# Patient Record
Sex: FEMALE | Race: WHITE | Employment: OTHER | URBAN - METROPOLITAN AREA
[De-identification: names, ages, dates, MRNs, and addresses within clinical notes are randomized per-mention and may not be internally consistent; named-entity substitution may affect disease eponyms.]

---

## 2017-02-27 RX ORDER — AMLODIPINE BESYLATE 10 MG/1
TABLET ORAL
Qty: 90 TABLET | Refills: 3 | Status: SHIPPED | OUTPATIENT
Start: 2017-02-27 | End: 2018-02-21 | Stop reason: SDUPTHER

## 2017-09-26 ENCOUNTER — OFFICE VISIT (OUTPATIENT)
Dept: CARDIOLOGY CLINIC | Age: 67
End: 2017-09-26

## 2017-09-26 VITALS
HEART RATE: 64 BPM | WEIGHT: 137 LBS | SYSTOLIC BLOOD PRESSURE: 132 MMHG | HEIGHT: 60 IN | OXYGEN SATURATION: 98 % | BODY MASS INDEX: 26.9 KG/M2 | DIASTOLIC BLOOD PRESSURE: 62 MMHG

## 2017-09-26 DIAGNOSIS — I10 ESSENTIAL HYPERTENSION: ICD-10-CM

## 2017-09-26 DIAGNOSIS — E78.2 MIXED HYPERLIPIDEMIA: ICD-10-CM

## 2017-09-26 DIAGNOSIS — I25.10 CORONARY ARTERY DISEASE INVOLVING NATIVE CORONARY ARTERY OF NATIVE HEART WITHOUT ANGINA PECTORIS: Primary | ICD-10-CM

## 2017-09-26 DIAGNOSIS — R09.89 BILATERAL CAROTID BRUITS: ICD-10-CM

## 2017-09-26 PROCEDURE — G8599 NO ASA/ANTIPLAT THER USE RNG: HCPCS | Performed by: NURSE PRACTITIONER

## 2017-09-26 PROCEDURE — 3014F SCREEN MAMMO DOC REV: CPT | Performed by: NURSE PRACTITIONER

## 2017-09-26 PROCEDURE — 1123F ACP DISCUSS/DSCN MKR DOCD: CPT | Performed by: NURSE PRACTITIONER

## 2017-09-26 PROCEDURE — G8427 DOCREV CUR MEDS BY ELIG CLIN: HCPCS | Performed by: NURSE PRACTITIONER

## 2017-09-26 PROCEDURE — 4040F PNEUMOC VAC/ADMIN/RCVD: CPT | Performed by: NURSE PRACTITIONER

## 2017-09-26 PROCEDURE — 3017F COLORECTAL CA SCREEN DOC REV: CPT | Performed by: NURSE PRACTITIONER

## 2017-09-26 PROCEDURE — G8400 PT W/DXA NO RESULTS DOC: HCPCS | Performed by: NURSE PRACTITIONER

## 2017-09-26 PROCEDURE — G8419 CALC BMI OUT NRM PARAM NOF/U: HCPCS | Performed by: NURSE PRACTITIONER

## 2017-09-26 PROCEDURE — 1036F TOBACCO NON-USER: CPT | Performed by: NURSE PRACTITIONER

## 2017-09-26 PROCEDURE — 99213 OFFICE O/P EST LOW 20 MIN: CPT | Performed by: NURSE PRACTITIONER

## 2017-09-26 PROCEDURE — 1090F PRES/ABSN URINE INCON ASSESS: CPT | Performed by: NURSE PRACTITIONER

## 2017-10-16 ENCOUNTER — TELEPHONE (OUTPATIENT)
Dept: CARDIOLOGY CLINIC | Age: 67
End: 2017-10-16

## 2017-10-16 DIAGNOSIS — R09.89 BRUIT: Primary | ICD-10-CM

## 2017-10-17 ENCOUNTER — HOSPITAL ENCOUNTER (OUTPATIENT)
Dept: VASCULAR LAB | Age: 67
Discharge: HOME | End: 2017-10-17
Attending: INTERNAL MEDICINE | Admitting: INTERNAL MEDICINE

## 2017-10-17 DIAGNOSIS — I65.23 OCCLUSION AND STENOSIS OF BILATERAL CAROTID ARTERIES: ICD-10-CM

## 2017-10-17 DIAGNOSIS — R09.89 BRUIT: ICD-10-CM

## 2017-10-18 ENCOUNTER — TELEPHONE (OUTPATIENT)
Dept: CARDIOLOGY CLINIC | Age: 67
End: 2017-10-18

## 2017-10-18 DIAGNOSIS — R09.89 CAROTID BRUIT, UNSPECIFIED LATERALITY: Primary | ICD-10-CM

## 2018-02-21 RX ORDER — AMLODIPINE BESYLATE 10 MG/1
TABLET ORAL
Qty: 90 TABLET | Refills: 3 | Status: SHIPPED | OUTPATIENT
Start: 2018-02-21 | End: 2019-01-29 | Stop reason: SDUPTHER

## 2018-09-25 NOTE — PROGRESS NOTES
(ULTRAM) 50 MG tablet Take 50 mg by mouth every 6 hours as needed.  metformin (GLUCOPHAGE) 850 MG tablet Take 850 mg by mouth 2 times daily (with meals).  Multiple Vitamin (MULTIVITAMIN PO) Take 1 tablet by mouth daily.  vitamin B-12 (CYANOCOBALAMIN) 1000 MCG tablet Take 500 mcg by mouth daily.  metoprolol (LOPRESSOR) 25 MG tablet Take 25 mg by mouth 2 times daily. No current facility-administered medications for this visit. REVIEW OF SYSTEMS:   CONSTITUTIONAL: No major weight gain or loss, fatigue, weakness, night sweats or fever. There's been no change in energy level, sleep pattern, or activity level. HEENT: No new vision difficulties or ringing in the ears. RESPIRATORY: No new SOB, PND, orthopnea or cough. CARDIOVASCULAR: See HPI  GI: No nausea, vomiting, diarrhea, constipation, abdominal pain or changes in bowel habits. : No urinary frequency, urgency, incontinence hematuria or dysuria. SKIN: No cyanosis or skin lesions. MUSCULOSKELETAL: No new muscle or joint pain. NEUROLOGICAL: No syncope or TIA-like symptoms. PSYCHIATRIC: No anxiety, pain, insomnia or depression    Objective:   PHYSICAL EXAM:        VITALS:    Vitals:    09/26/18 1056   BP: 130/62   Pulse: 72           CONSTITUTIONAL: Cooperative, no apparent distress, and appears well nourished / developed  NEUROLOGIC:  Awake and orientated to person, place and time. PSYCH: Calm affect. SKIN: Warm and dry. HEENT: Sclera non-icteric, normocephalic, neck supple, no elevation of JVP, normal carotid pulses with no bruits and thyroid normal size. LUNGS:  No increased work of breathing and clear to auscultation, no crackles or wheezing. CARDIOVASCULAR:  Regular rate and rhythm with no murmurs, gallops, rubs, or abnormal heart sounds, normal PMI. The apical impulses not displaced.  Heart tones are crisp and normal                                                                                          Cervical

## 2018-09-26 ENCOUNTER — OFFICE VISIT (OUTPATIENT)
Dept: CARDIOLOGY CLINIC | Age: 68
End: 2018-09-26
Payer: MEDICARE

## 2018-09-26 VITALS
DIASTOLIC BLOOD PRESSURE: 62 MMHG | WEIGHT: 145.2 LBS | HEART RATE: 72 BPM | HEIGHT: 60 IN | BODY MASS INDEX: 28.51 KG/M2 | SYSTOLIC BLOOD PRESSURE: 130 MMHG

## 2018-09-26 DIAGNOSIS — I10 ESSENTIAL HYPERTENSION: ICD-10-CM

## 2018-09-26 DIAGNOSIS — I25.10 CORONARY ARTERY DISEASE INVOLVING NATIVE CORONARY ARTERY OF NATIVE HEART WITHOUT ANGINA PECTORIS: Primary | ICD-10-CM

## 2018-09-26 DIAGNOSIS — E78.2 MIXED HYPERLIPIDEMIA: ICD-10-CM

## 2018-09-26 PROCEDURE — G8599 NO ASA/ANTIPLAT THER USE RNG: HCPCS | Performed by: NURSE PRACTITIONER

## 2018-09-26 PROCEDURE — 4040F PNEUMOC VAC/ADMIN/RCVD: CPT | Performed by: NURSE PRACTITIONER

## 2018-09-26 PROCEDURE — G8427 DOCREV CUR MEDS BY ELIG CLIN: HCPCS | Performed by: NURSE PRACTITIONER

## 2018-09-26 PROCEDURE — 1036F TOBACCO NON-USER: CPT | Performed by: NURSE PRACTITIONER

## 2018-09-26 PROCEDURE — 1123F ACP DISCUSS/DSCN MKR DOCD: CPT | Performed by: NURSE PRACTITIONER

## 2018-09-26 PROCEDURE — 1101F PT FALLS ASSESS-DOCD LE1/YR: CPT | Performed by: NURSE PRACTITIONER

## 2018-09-26 PROCEDURE — 3017F COLORECTAL CA SCREEN DOC REV: CPT | Performed by: NURSE PRACTITIONER

## 2018-09-26 PROCEDURE — 99214 OFFICE O/P EST MOD 30 MIN: CPT | Performed by: NURSE PRACTITIONER

## 2018-09-26 PROCEDURE — G8400 PT W/DXA NO RESULTS DOC: HCPCS | Performed by: NURSE PRACTITIONER

## 2018-09-26 PROCEDURE — G8419 CALC BMI OUT NRM PARAM NOF/U: HCPCS | Performed by: NURSE PRACTITIONER

## 2018-09-26 PROCEDURE — 1090F PRES/ABSN URINE INCON ASSESS: CPT | Performed by: NURSE PRACTITIONER

## 2018-09-26 NOTE — COMMUNICATION BODY
(ULTRAM) 50 MG tablet Take 50 mg by mouth every 6 hours as needed.  metformin (GLUCOPHAGE) 850 MG tablet Take 850 mg by mouth 2 times daily (with meals).  Multiple Vitamin (MULTIVITAMIN PO) Take 1 tablet by mouth daily.  vitamin B-12 (CYANOCOBALAMIN) 1000 MCG tablet Take 500 mcg by mouth daily.  metoprolol (LOPRESSOR) 25 MG tablet Take 25 mg by mouth 2 times daily. No current facility-administered medications for this visit. REVIEW OF SYSTEMS:   CONSTITUTIONAL: No major weight gain or loss, fatigue, weakness, night sweats or fever. There's been no change in energy level, sleep pattern, or activity level. HEENT: No new vision difficulties or ringing in the ears. RESPIRATORY: No new SOB, PND, orthopnea or cough. CARDIOVASCULAR: See HPI  GI: No nausea, vomiting, diarrhea, constipation, abdominal pain or changes in bowel habits. : No urinary frequency, urgency, incontinence hematuria or dysuria. SKIN: No cyanosis or skin lesions. MUSCULOSKELETAL: No new muscle or joint pain. NEUROLOGICAL: No syncope or TIA-like symptoms. PSYCHIATRIC: No anxiety, pain, insomnia or depression    Objective:   PHYSICAL EXAM:        VITALS:    Vitals:    09/26/18 1056   BP: 130/62   Pulse: 72           CONSTITUTIONAL: Cooperative, no apparent distress, and appears well nourished / developed  NEUROLOGIC:  Awake and orientated to person, place and time. PSYCH: Calm affect. SKIN: Warm and dry. HEENT: Sclera non-icteric, normocephalic, neck supple, no elevation of JVP, normal carotid pulses with no bruits and thyroid normal size. LUNGS:  No increased work of breathing and clear to auscultation, no crackles or wheezing. CARDIOVASCULAR:  Regular rate and rhythm with no murmurs, gallops, rubs, or abnormal heart sounds, normal PMI. The apical impulses not displaced.  Heart tones are crisp and normal                                                                                          Cervical patient's cardiac risk factors and adjusted pharmacologic treatment as needed. In addition, I have reinforced the need for patient directed risk factor modification. Further evaluation will be based upon the patient's clinical course and testing results. All questions and concerns were addressed to the patient/family. Alternatives to  treatment were discussed. The patient  currently  is not smoking. The risks related to smoking were reviewed with the patient. Recommend maintaining a smoke-free lifestyle. Products available for smoking cessation were discussed. Thank you for allowing to us to participate in the care of [address] [place] [address]

## 2018-09-26 NOTE — LETTER
[facility]  [address] [address] [address] [address]  Phone: [phone]  Fax: [address], APRN - LORRI        September 26, 2018     Leonardo Boyd  [address] [address]    Patient: Leigh Bruner  MR Number: [id number]  YOB: 1950  Date of Visit: 9/26/2018    Dear Dr. Leonardo Boyd:    Thank you for the request for consultation for Stephie Crockett to me for the evaluation of CAD. Below are the relevant portions of my assessment and plan of care. [address]     Outpatient Follow Up Note    CHIEF COMPLAINT / HPI: Follow Up secondary to coronary artery disease/ HTN/ Hyperlipidemia/ Carotid disease       Leigh Bruner is 79 y.o. female who presents today for a routine follow up  related to the above mentioned issues. Subjective:   Ms. Stephie Crockett is a 61year old female followed for her history of CAD s/p CABG in 1993. She has a history of HTN, hyperlipidemia, and carotid bruit. She underwent a cath on 2/2/14 which showed LIMA patent to LAD, occluded Cx with small collaterals. Patient is being seen for a routine office visit. She denies any chest pain, palpitations, SOB, dizziness, or edema. With regard to medication therapy the patient has been compliant with prescribed regimen. They have tolerated therapy to date.      Past Medical History:   Diagnosis Date    CAD (coronary artery disease)     Diabetes mellitus ([facility] [address]     Hyperlipidemia     Hypertension      Social History:    History   Smoking Status    Former Smoker    Packs/day: 1.00    Years: 30.00   Smokeless Tobacco    Never Used     Comment: quit 10/2007     Current Medications:  Current Outpatient Prescriptions   Medication Sig Dispense Refill    potassium chloride (KLOR-CON M) 20 MEQ extended release tablet Take 1 tablet by mouth daily for 7 days 7 tablet 0 Lexiscan 6/2016: Nml perfusion, EF nml. Patient denies any anginal symptoms  On ASA, Lisinopril, Lopressor, and Lovastatin. Plan: continue current medications. 2. HTN (hypertension): today's B/P- 130/62  Stable  Continue current medications: Norvasc, HCTZ, Lisinopril, and Lopressor. 3. Hyperlipidemia: 9/2018:  HDL 74, LDL 51; on lovastatin 20 mg daily   4. Carotid bruit: 2/2013: SIVAKUMAR 87-57%, <24% LICA-- 6/8350: SIVAKUMAR 51-82%, LICA <37%        Patient  is stable since last office visit. Plan:   Continue current medications  Labs followed per PCP  Slip given for carotid ultrasound  Follow up in one year     I have addressed the patient's cardiac risk factors and adjusted pharmacologic treatment as needed. In addition, I have reinforced the need for patient directed risk factor modification. Further evaluation will be based upon the patient's clinical course and testing results. All questions and concerns were addressed to the patient/family. Alternatives to  treatment were discussed. The patient  currently  is not smoking. The risks related to smoking were reviewed with the patient. Recommend maintaining a smoke-free lifestyle. Products available for smoking cessation were discussed. Thank you for allowing to us to participate in the care of [address] [place] [address]. Asim Mcgrathwood LORRI      If you have questions, please do not hesitate to call me. I look forward to following Daxa along with you.     Sincerely,        ИВАН Hall - LORRI

## 2019-01-29 RX ORDER — AMLODIPINE BESYLATE 10 MG/1
TABLET ORAL
Qty: 90 TABLET | Refills: 3 | Status: ON HOLD | OUTPATIENT
Start: 2019-01-29 | End: 2019-03-31 | Stop reason: HOSPADM

## 2019-03-27 ENCOUNTER — APPOINTMENT (OUTPATIENT)
Dept: GENERAL RADIOLOGY | Age: 69
DRG: 310 | End: 2019-03-27
Payer: MEDICARE

## 2019-03-27 ENCOUNTER — HOSPITAL ENCOUNTER (EMERGENCY)
Age: 69
Discharge: HOME | DRG: 310 | End: 2019-03-27
Attending: EMERGENCY MEDICINE
Payer: MEDICARE

## 2019-03-27 VITALS
RESPIRATION RATE: 16 BRPM | TEMPERATURE: 97.3 F | HEART RATE: 77 BPM | OXYGEN SATURATION: 95 % | HEIGHT: 60 IN | SYSTOLIC BLOOD PRESSURE: 115 MMHG | DIASTOLIC BLOOD PRESSURE: 59 MMHG | WEIGHT: 147 LBS | BODY MASS INDEX: 28.86 KG/M2

## 2019-03-27 DIAGNOSIS — E83.42 HYPOMAGNESEMIA: ICD-10-CM

## 2019-03-27 DIAGNOSIS — R00.2 PALPITATIONS: Primary | ICD-10-CM

## 2019-03-27 LAB
A/G RATIO: 1 (ref 1.1–2.2)
ALBUMIN SERPL-MCNC: 3.7 G/DL (ref 3.4–5)
ALP BLD-CCNC: 97 U/L (ref 40–129)
ALT SERPL-CCNC: 12 U/L (ref 10–40)
ANION GAP SERPL CALCULATED.3IONS-SCNC: 15 MMOL/L (ref 3–16)
APTT: 28.1 SEC (ref 26–36)
AST SERPL-CCNC: 26 U/L (ref 15–37)
BASOPHILS ABSOLUTE: 0 K/UL (ref 0–0.2)
BASOPHILS RELATIVE PERCENT: 0.5 %
BILIRUB SERPL-MCNC: <0.2 MG/DL (ref 0–1)
BUN BLDV-MCNC: 18 MG/DL (ref 7–20)
CALCIUM SERPL-MCNC: 9.3 MG/DL (ref 8.3–10.6)
CHLORIDE BLD-SCNC: 94 MMOL/L (ref 99–110)
CO2: 20 MMOL/L (ref 21–32)
CREAT SERPL-MCNC: 1 MG/DL (ref 0.6–1.2)
EOSINOPHILS ABSOLUTE: 0 K/UL (ref 0–0.6)
EOSINOPHILS RELATIVE PERCENT: 0.7 %
GFR AFRICAN AMERICAN: >60
GFR NON-AFRICAN AMERICAN: 55
GLOBULIN: 3.8 G/DL
GLUCOSE BLD-MCNC: 158 MG/DL (ref 70–99)
HCT VFR BLD CALC: 39.6 % (ref 36–48)
HEMOGLOBIN: 13.3 G/DL (ref 12–16)
INR BLD: 0.94 (ref 0.86–1.14)
LYMPHOCYTES ABSOLUTE: 1.6 K/UL (ref 1–5.1)
LYMPHOCYTES RELATIVE PERCENT: 21.4 %
MAGNESIUM: 1.6 MG/DL (ref 1.8–2.4)
MCH RBC QN AUTO: 29.7 PG (ref 26–34)
MCHC RBC AUTO-ENTMCNC: 33.5 G/DL (ref 31–36)
MCV RBC AUTO: 88.6 FL (ref 80–100)
MONOCYTES ABSOLUTE: 0.6 K/UL (ref 0–1.3)
MONOCYTES RELATIVE PERCENT: 7.5 %
NEUTROPHILS ABSOLUTE: 5.4 K/UL (ref 1.7–7.7)
NEUTROPHILS RELATIVE PERCENT: 69.9 %
PDW BLD-RTO: 12.7 % (ref 12.4–15.4)
PLATELET # BLD: 340 K/UL (ref 135–450)
PMV BLD AUTO: 7.1 FL (ref 5–10.5)
POTASSIUM SERPL-SCNC: 4.8 MMOL/L (ref 3.5–5.1)
POTASSIUM SERPL-SCNC: 5.5 MMOL/L (ref 3.5–5.1)
PROTHROMBIN TIME: 10.7 SEC (ref 9.8–13)
RBC # BLD: 4.47 M/UL (ref 4–5.2)
SODIUM BLD-SCNC: 129 MMOL/L (ref 136–145)
TOTAL PROTEIN: 7.5 G/DL (ref 6.4–8.2)
TROPONIN: <0.01 NG/ML
WBC # BLD: 7.7 K/UL (ref 4–11)

## 2019-03-27 PROCEDURE — 84132 ASSAY OF SERUM POTASSIUM: CPT

## 2019-03-27 PROCEDURE — 93005 ELECTROCARDIOGRAM TRACING: CPT | Performed by: PHYSICIAN ASSISTANT

## 2019-03-27 PROCEDURE — 85610 PROTHROMBIN TIME: CPT

## 2019-03-27 PROCEDURE — 84484 ASSAY OF TROPONIN QUANT: CPT

## 2019-03-27 PROCEDURE — 80053 COMPREHEN METABOLIC PANEL: CPT

## 2019-03-27 PROCEDURE — 96360 HYDRATION IV INFUSION INIT: CPT

## 2019-03-27 PROCEDURE — 6370000000 HC RX 637 (ALT 250 FOR IP): Performed by: PHYSICIAN ASSISTANT

## 2019-03-27 PROCEDURE — 83735 ASSAY OF MAGNESIUM: CPT

## 2019-03-27 PROCEDURE — 85025 COMPLETE CBC W/AUTO DIFF WBC: CPT

## 2019-03-27 PROCEDURE — 99285 EMERGENCY DEPT VISIT HI MDM: CPT

## 2019-03-27 PROCEDURE — 93005 ELECTROCARDIOGRAM TRACING: CPT | Performed by: EMERGENCY MEDICINE

## 2019-03-27 PROCEDURE — 71045 X-RAY EXAM CHEST 1 VIEW: CPT

## 2019-03-27 PROCEDURE — 2580000003 HC RX 258: Performed by: PHYSICIAN ASSISTANT

## 2019-03-27 PROCEDURE — 96361 HYDRATE IV INFUSION ADD-ON: CPT

## 2019-03-27 PROCEDURE — 85730 THROMBOPLASTIN TIME PARTIAL: CPT

## 2019-03-27 RX ORDER — 0.9 % SODIUM CHLORIDE 0.9 %
1000 INTRAVENOUS SOLUTION INTRAVENOUS ONCE
Status: COMPLETED | OUTPATIENT
Start: 2019-03-27 | End: 2019-03-27

## 2019-03-27 RX ADMIN — Medication 400 MG: at 21:24

## 2019-03-27 RX ADMIN — SODIUM CHLORIDE 1000 ML: 9 INJECTION, SOLUTION INTRAVENOUS at 21:24

## 2019-03-28 LAB
EKG ATRIAL RATE: 316 BPM
EKG ATRIAL RATE: 326 BPM
EKG DIAGNOSIS: NORMAL
EKG DIAGNOSIS: NORMAL
EKG P AXIS: 170 DEGREES
EKG Q-T INTERVAL: 348 MS
EKG Q-T INTERVAL: 366 MS
EKG QRS DURATION: 66 MS
EKG QRS DURATION: 66 MS
EKG QTC CALCULATION (BAZETT): 419 MS
EKG QTC CALCULATION (BAZETT): 453 MS
EKG R AXIS: 2 DEGREES
EKG R AXIS: 21 DEGREES
EKG T AXIS: 52 DEGREES
EKG T AXIS: 65 DEGREES
EKG VENTRICULAR RATE: 102 BPM
EKG VENTRICULAR RATE: 79 BPM

## 2019-03-28 PROCEDURE — 93010 ELECTROCARDIOGRAM REPORT: CPT | Performed by: INTERNAL MEDICINE

## 2019-03-28 ASSESSMENT — ENCOUNTER SYMPTOMS
RHINORRHEA: 0
VOMITING: 0
SHORTNESS OF BREATH: 0
NAUSEA: 0
ABDOMINAL PAIN: 0
DIARRHEA: 0
COUGH: 0

## 2019-03-28 NOTE — ED PROVIDER NOTES
Albumin/Globulin Ratio 1.0 (L) 1.1 - 2.2    Total Bilirubin <0.2 0.0 - 1.0 mg/dL    Alkaline Phosphatase 97 40 - 129 U/L    ALT 12 10 - 40 U/L    AST 26 15 - 37 U/L    Globulin 3.8 g/dL   Protime-INR   Result Value Ref Range    Protime 10.7 9.8 - 13.0 sec    INR 0.94 0.86 - 1.14   APTT   Result Value Ref Range    aPTT 28.1 26.0 - 36.0 sec   Troponin   Result Value Ref Range    Troponin <0.01 <0.01 ng/mL   Magnesium   Result Value Ref Range    Magnesium 1.60 (L) 1.80 - 2.40 mg/dL   Potassium   Result Value Ref Range    Potassium 4.8 3.5 - 5.1 mmol/L   EKG 12 Lead   Result Value Ref Range    Ventricular Rate 90 BPM    Atrial Rate 90 BPM    P-R Interval 300 ms    QRS Duration 66 ms    Q-T Interval 330 ms    QTc Calculation (Bazett) 403 ms    R Axis 32 degrees    T Axis 44 degrees    Diagnosis       Sinus rhythm with 1st degree A-V block with Premature atrial complexesOtherwise normal ECG   EKG 12 Lead   Result Value Ref Range    Ventricular Rate 79 BPM    Atrial Rate 316 BPM    QRS Duration 66 ms    Q-T Interval 366 ms    QTc Calculation (Bazett) 419 ms    P Axis 170 degrees    R Axis 2 degrees    T Axis 52 degrees    Diagnosis       Atrial flutter with 4:1 A-V conductionCannot rule out Anterior infarct , age undeterminedAbnormal ECG     Xr Chest Portable    Result Date: 3/27/2019  EXAMINATION: SINGLE XRAY VIEW OF THE CHEST 3/27/2019 8:13 pm COMPARISON: 03/17/2018 HISTORY: ORDERING SYSTEM PROVIDED HISTORY: palpitations TECHNOLOGIST PROVIDED HISTORY: Reason for exam:->palpitations Ordering Physician Provided Reason for Exam: Palpitations (Pt reporting palpitations that began this morning when she woke up, reports that palpitations subsided however they came back this evening. SOB with palpations, denies hx of a fib.) Acuity: Unknown Type of Exam: Unknown FINDINGS: Sternotomy wires are noted. The heart size and mediastinal contours are stable. The lungs are clear. Stable portable study.          Coleen Hernandez

## 2019-03-28 NOTE — ED PROVIDER NOTES
[address] [facility]  eMERGENCY dEPARTMENT eNCOUnter        Pt Name: Elliot Trevizo  MRN: [id number]  [place] 1950  Date of evaluation: 3/27/2019  Provider: May Ayers PA-C  PCP: Adeline Hernandez    This patient was seen and evaluated by the attending physician Tory Cowden, MD    [address] [place] [address]       Chief Complaint   Patient presents with    Palpitations     Pt reporting palpitations that began this morning when she woke up, reports that palpitations subsided however they came back this evening. SOB with palpations, denies hx of a fib. HISTORY OF PRESENT ILLNESS   (Location/Symptom, Timing/Onset, Context/Setting, Quality, Duration, Modifying Factors, Severity)  Note limiting factors. Elliot Trevizo is a 79 y.o. female presents to the emergency department today for evaluation for palpitations. The patient states that she noticed palpitations when she woke up this morning. She states that she's had intermittent palpitations are up-to-date. She states that they seemed to worsen this evening, she states that \"I slit my heart is skipping beats\". Patient denies ever having any chest pain or shortness of breath. She denies any abdominal pain. No nausea, vomiting or diarrhea. No fever or chills. No cough or congestion. She is a smoker. Positive family history. ,  He has a history of hypertension diabetes and hyperlipidemia. Patient denies any recent travels immobilizations or surgeries. No history of DVT or PE. No lower leg pain or swelling. No estrogen therapies. Patient states that she has had symptoms similar in the past and she states that her left lites were out of balance and she feels that this could be the cause of her symptoms. She has no other complaints at this time    Nursing Notes were all reviewed and agreed with or any disagreements were addressed  in the HPI.     REVIEW OF SYSTEMS    (2-9 systems for level 4, 10 or more for level 5) Review of Systems   Constitutional: Negative for activity change, appetite change, chills and fever. HENT: Negative for congestion and rhinorrhea. Respiratory: Negative for cough and shortness of breath. Cardiovascular: Positive for palpitations. Negative for chest pain. Gastrointestinal: Negative for abdominal pain, diarrhea, nausea and vomiting. Genitourinary: Negative for difficulty urinating, dysuria and hematuria. Positives and Pertinent negatives as per HPI. Except as noted abovein the ROS, all other systems were reviewed and negative. PAST MEDICAL HISTORY     Past Medical History:   Diagnosis Date    CAD (coronary artery disease)     Diabetes mellitus ([facility] [address]     Hyperlipidemia     Hypertension          SURGICAL HISTORY     Past Surgical History:   Procedure Laterality Date    CARDIAC SURGERY  2001    stent    CHOLECYSTECTOMY      CORONARY ARTERY BYPASS GRAFT  1993    GASTRIC BYPASS SURGERY           CURRENTMEDICATIONS       Discharge Medication List as of 3/27/2019 11:00 PM      CONTINUE these medications which have NOT CHANGED    Details   amLODIPine (NORVASC) 10 MG tablet TAKE 1 TABLET BY MOUTH EVERY DAY, Disp-90 tablet, R-3Normal      potassium chloride (KLOR-CON M) 20 MEQ extended release tablet Take 1 tablet by mouth daily for 7 days, Disp-7 tablet, R-0Print      ferrous sulfate 325 (65 FE) MG tablet Take 325 mg by mouth daily (with breakfast). lisinopril-hydrochlorothiazide (PRINZIDE;ZESTORETIC) 20-12.5 MG per tablet Take 1 tablet by mouth 2 times daily. aspirin 81 MG tablet Take 1 tablet by mouth daily. , Disp-30 tablet, R-6      ibuprofen (ADVIL;MOTRIN) 800 MG tablet Take 800 mg by mouth every 8 hours as needed. calcium carbonate (OSCAL) 500 MG TABS tablet Take 600 mg by mouth 2 times daily. lovastatin (MEVACOR) 20 MG tablet Take 20 mg by mouth nightly. tramadol (ULTRAM) 50 MG tablet Take 50 mg by mouth every 6 hours as needed. metformin (GLUCOPHAGE) 850 MG tablet Take 850 mg by mouth 2 times daily (with meals). Multiple Vitamin (MULTIVITAMIN PO) Take 1 tablet by mouth daily. vitamin B-12 (CYANOCOBALAMIN) 1000 MCG tablet Take 500 mcg by mouth daily. metoprolol (LOPRESSOR) 25 MG tablet Take 25 mg by mouth 2 times daily.                  ALLERGIES     Codeine and Demerol    FAMILYHISTORY       Family History   Problem Relation Age of Onset    Cancer Mother     Diabetes Father     Heart Disease Father     Cancer Father     Cancer Maternal Aunt           SOCIAL HISTORY       Social History     Socioeconomic History    Marital status:      Spouse name: None    Number of children: None    Years of education: None    Highest education level: None   Occupational History    None   Social Needs    Financial resource strain: None    Food insecurity:     Worry: None     Inability: None    Transportation needs:     Medical: None     Non-medical: None   Tobacco Use    Smoking status: Former Smoker     Packs/day: 1.00     Years: 30.00     Pack years: 30.00    Smokeless tobacco: Never Used    Tobacco comment: quit 10/2007   Substance and Sexual Activity    Alcohol use: No     Alcohol/week: 0.0 oz    Drug use: No    Sexual activity: None   Lifestyle    Physical activity:     Days per week: None     Minutes per session: None    Stress: None   Relationships    Social connections:     Talks on phone: None     Gets together: None     Attends Taoism service: None     Active member of club or organization: None     Attends meetings of clubs or organizations: None     Relationship status: None    Intimate partner violence:     Fear of current or ex partner: None     Emotionally abused: None     Physically abused: None     Forced sexual activity: None   Other Topics Concern    None   Social History Narrative    None       SCREENINGS             PHYSICAL EXAM    (up to 7 for level 4, 8 or more for level 5) ED Triage Vitals   BP Temp Temp src Pulse Resp SpO2 Height Weight   03/27/19 1945 03/27/19 1943 -- 03/27/19 1943 03/27/19 1943 03/27/19 1943 03/27/19 1943 03/27/19 1943   (!) 128/56 97.3 °F (36.3 °C)  73 16 97 % 5' (1.524 m) 147 lb (66.7 kg)       Physical Exam   Constitutional: She is oriented to person, place, and time. She appears well-developed and well-nourished. HENT:   Head: Normocephalic and atraumatic. Right Ear: External ear normal.   Left Ear: External ear normal.   Nose: Nose normal.   Eyes: Right eye exhibits no discharge. Left eye exhibits no discharge. Neck: Normal range of motion. Neck supple. No tracheal deviation present. Cardiovascular: Normal rate, regular rhythm and normal heart sounds. No murmur heard. Pulmonary/Chest: Effort normal and breath sounds normal. No stridor. No respiratory distress. She has no wheezes. Abdominal: Soft. Bowel sounds are normal. She exhibits no distension. There is no tenderness. There is no guarding. Musculoskeletal: Normal range of motion. Neurological: She is alert and oriented to person, place, and time. Skin: Skin is warm and dry. She is not diaphoretic. Psychiatric: She has a normal mood and affect. Her behavior is normal.   Nursing note and vitals reviewed.       DIAGNOSTIC RESULTS   LABS:    Labs Reviewed   COMPREHENSIVE METABOLIC PANEL - Abnormal; Notable for the following components:       Result Value    Sodium 129 (*)     Potassium 5.5 (*)     Chloride 94 (*)     CO2 20 (*)     Glucose 158 (*)     GFR Non-African American 55 (*)     Albumin/Globulin Ratio 1.0 (*)     All other components within normal limits    Narrative:     Performed at:  [facility]  [address],  [place], [address]   Phone [phone]   MAGNESIUM - Abnormal; Notable for the following components:    Magnesium 1.60 (*)     All other components within normal limits    Narrative:     Performed at:  [facility] SOB with palpations, denies hx of a fib.) Acuity: Unknown Type of Exam: Unknown FINDINGS: Sternotomy wires are noted. The heart size and mediastinal contours are stable. The lungs are clear. Stable portable study. PROCEDURES   Unless otherwise noted below, none     Procedures    CRITICAL CARE TIME   N/A    CONSULTS:  None      EMERGENCY DEPARTMENT COURSE and DIFFERENTIALDIAGNOSIS/MDM:   Vitals:    Vitals:    03/27/19 2045 03/27/19 2100 03/27/19 2115 03/27/19 2130   BP: (!) 114/57 124/60 (!) 113/57 (!) 115/59   Pulse: 79 88 79 77   Resp:       Temp:       SpO2: 94% 93% 94% 95%   Weight:       Height:           Patient was given thefollowing medications:  Medications   0.9 % sodium chloride bolus (0 mLs Intravenous Stopped 3/27/19 2259)   magnesium oxide (MAG-OX) tablet 400 mg (400 mg Oral Given 3/27/19 2124)       The patient presents to the emergency department today for evaluation for palpitations. The patient states that she noticed palpitations when she woke up this morning. She states that she's had intermittent palpitations are up-to-date. She states that they seemed to worsen this evening, she states that \"I slit my heart is skipping beats\". Patient denies ever having any chest pain or shortness of breath. She denies any abdominal pain. No nausea, vomiting or diarrhea. No fever or chills. No cough or congestion. She is a smoker. Positive family history. ,  He has a history of hypertension diabetes and hyperlipidemia. Patient denies any recent travels immobilizations or surgeries. No history of DVT or PE. No lower leg pain or swelling. No estrogen therapies. Patient states that she has had symptoms similar in the past and she states that her left lites were out of balance and she feels that this could be the cause of her symptoms. She has no other complaints at this time    Physical exam is unremarkable. Heart is regular rate and rhythm    CBC shows no evidence of leukocytosis.   CMP shows a hyponatremia of 1.9 just given fluids. Her potassium is 4.8. Bun and negative. Her magnesium is low at 1.6. Chest x-ray is negative. Is given fluids and a magnesium replacement in the ED. Repeat EKG was performed, which does show possible new onset atrial fibrillation. I did recommend that the patient be admitted due to her possible new onset A. fib, ulcer cardiac risk factors. She declined admission she does not want to stay overnight. Patient is alert and oriented ×3 she is capable of making this decision, she states that she does have a cardiologist and she will follow-up this week. She is to return to the ED for any new or worsening symptoms. Patient does understand the risks of leaving AMA including permanent disability and death. She accepts these risks and she is to sign an AMA    FINAL IMPRESSION      1. Palpitations    2.  Hypomagnesemia          DISPOSITION/PLAN   DISPOSITION [place] 03/27/2019 10:59:48 PM      PATIENT REFERREDTO:  Geovani Brown [facility]  [address] [place] [address]  [phone]    Schedule an appointment as soon as possible for a visit in 1 day      Jessica Johnson MD  [address] [place] [address] [address] [place] [address]  [phone]    Schedule an appointment as soon as possible for a visit in 1 day      [facility] Emergency Department  [address] [place] [address]  [phone]    As needed, If symptoms worsen      DISCHARGE MEDICATIONS:  Discharge Medication List as of 3/27/2019 11:00 PM          DISCONTINUED MEDICATIONS:  Discharge Medication List as of 3/27/2019 11:00 PM                 (Please note that portions ofthis note were completed with a voice recognition program.  Efforts were made to edit the dictations but occasionally words are mis-transcribed.)    Tashi Bruno PA-C (electronically signed)            Tashi Bruno PA-C  03/28/19 0217

## 2019-03-29 ENCOUNTER — APPOINTMENT (OUTPATIENT)
Dept: GENERAL RADIOLOGY | Age: 69
DRG: 310 | End: 2019-03-29
Payer: MEDICARE

## 2019-03-29 ENCOUNTER — HOSPITAL ENCOUNTER (INPATIENT)
Age: 69
LOS: 2 days | Discharge: HOME | DRG: 310 | End: 2019-03-31
Attending: EMERGENCY MEDICINE | Admitting: INTERNAL MEDICINE
Payer: MEDICARE

## 2019-03-29 DIAGNOSIS — R06.02 SHORTNESS OF BREATH: ICD-10-CM

## 2019-03-29 DIAGNOSIS — R00.2 PALPITATIONS: ICD-10-CM

## 2019-03-29 DIAGNOSIS — I48.91 ATRIAL FIBRILLATION, UNSPECIFIED TYPE (HCC): Primary | ICD-10-CM

## 2019-03-29 PROBLEM — I48.92 ATRIAL FLUTTER WITH RAPID VENTRICULAR RESPONSE (HCC): Status: ACTIVE | Noted: 2019-03-29

## 2019-03-29 PROBLEM — E11.9 DMII (DIABETES MELLITUS, TYPE 2) (HCC): Status: ACTIVE | Noted: 2019-03-29

## 2019-03-29 LAB
A/G RATIO: 1.1 (ref 1.1–2.2)
ALBUMIN SERPL-MCNC: 3.8 G/DL (ref 3.4–5)
ALP BLD-CCNC: 85 U/L (ref 40–129)
ALT SERPL-CCNC: 11 U/L (ref 10–40)
ANION GAP SERPL CALCULATED.3IONS-SCNC: 15 MMOL/L (ref 3–16)
AST SERPL-CCNC: 18 U/L (ref 15–37)
ATYPICAL LYMPHOCYTE RELATIVE PERCENT: 3 % (ref 0–6)
BASOPHILS ABSOLUTE: 0 K/UL (ref 0–0.2)
BASOPHILS RELATIVE PERCENT: 0 %
BILIRUB SERPL-MCNC: <0.2 MG/DL (ref 0–1)
BUN BLDV-MCNC: 11 MG/DL (ref 7–20)
CALCIUM SERPL-MCNC: 9.1 MG/DL (ref 8.3–10.6)
CHLORIDE BLD-SCNC: 97 MMOL/L (ref 99–110)
CO2: 21 MMOL/L (ref 21–32)
CREAT SERPL-MCNC: 0.6 MG/DL (ref 0.6–1.2)
EKG ATRIAL RATE: 326 BPM
EKG DIAGNOSIS: NORMAL
EKG Q-T INTERVAL: 354 MS
EKG QRS DURATION: 66 MS
EKG QTC CALCULATION (BAZETT): 408 MS
EKG R AXIS: 83 DEGREES
EKG T AXIS: 47 DEGREES
EKG VENTRICULAR RATE: 80 BPM
EOSINOPHILS ABSOLUTE: 0 K/UL (ref 0–0.6)
EOSINOPHILS RELATIVE PERCENT: 0 %
GFR AFRICAN AMERICAN: >60
GFR NON-AFRICAN AMERICAN: >60
GLOBULIN: 3.4 G/DL
GLUCOSE BLD-MCNC: 107 MG/DL (ref 70–99)
GLUCOSE BLD-MCNC: 120 MG/DL (ref 70–99)
HCT VFR BLD CALC: 39 % (ref 36–48)
HEMOGLOBIN: 13.2 G/DL (ref 12–16)
INR BLD: 0.91 (ref 0.86–1.14)
LYMPHOCYTES ABSOLUTE: 1.2 K/UL (ref 1–5.1)
LYMPHOCYTES RELATIVE PERCENT: 12 %
MCH RBC QN AUTO: 30.3 PG (ref 26–34)
MCHC RBC AUTO-ENTMCNC: 33.9 G/DL (ref 31–36)
MCV RBC AUTO: 89.5 FL (ref 80–100)
MONOCYTES ABSOLUTE: 0.9 K/UL (ref 0–1.3)
MONOCYTES RELATIVE PERCENT: 11 %
NEUTROPHILS ABSOLUTE: 6.1 K/UL (ref 1.7–7.7)
NEUTROPHILS RELATIVE PERCENT: 74 %
PDW BLD-RTO: 12.6 % (ref 12.4–15.4)
PERFORMED ON: ABNORMAL
PLATELET # BLD: 397 K/UL (ref 135–450)
PLATELET SLIDE REVIEW: ADEQUATE
PMV BLD AUTO: 6.8 FL (ref 5–10.5)
POTASSIUM SERPL-SCNC: 5.1 MMOL/L (ref 3.5–5.1)
PRO-BNP: 1080 PG/ML (ref 0–124)
PROTHROMBIN TIME: 10.4 SEC (ref 9.8–13)
RBC # BLD: 4.36 M/UL (ref 4–5.2)
RBC # BLD: NORMAL 10*6/UL
SLIDE REVIEW: NORMAL
SODIUM BLD-SCNC: 133 MMOL/L (ref 136–145)
TOTAL PROTEIN: 7.2 G/DL (ref 6.4–8.2)
TROPONIN: <0.01 NG/ML
WBC # BLD: 8.2 K/UL (ref 4–11)

## 2019-03-29 PROCEDURE — 2060000000 HC ICU INTERMEDIATE R&B

## 2019-03-29 PROCEDURE — 85610 PROTHROMBIN TIME: CPT

## 2019-03-29 PROCEDURE — 2500000003 HC RX 250 WO HCPCS: Performed by: INTERNAL MEDICINE

## 2019-03-29 PROCEDURE — 2500000003 HC RX 250 WO HCPCS: Performed by: NURSE PRACTITIONER

## 2019-03-29 PROCEDURE — 6360000002 HC RX W HCPCS: Performed by: NURSE PRACTITIONER

## 2019-03-29 PROCEDURE — 99285 EMERGENCY DEPT VISIT HI MDM: CPT

## 2019-03-29 PROCEDURE — 96374 THER/PROPH/DIAG INJ IV PUSH: CPT

## 2019-03-29 PROCEDURE — 80053 COMPREHEN METABOLIC PANEL: CPT

## 2019-03-29 PROCEDURE — 6360000002 HC RX W HCPCS: Performed by: INTERNAL MEDICINE

## 2019-03-29 PROCEDURE — 93010 ELECTROCARDIOGRAM REPORT: CPT | Performed by: INTERNAL MEDICINE

## 2019-03-29 PROCEDURE — 2580000003 HC RX 258: Performed by: INTERNAL MEDICINE

## 2019-03-29 PROCEDURE — 83880 ASSAY OF NATRIURETIC PEPTIDE: CPT

## 2019-03-29 PROCEDURE — 93005 ELECTROCARDIOGRAM TRACING: CPT | Performed by: EMERGENCY MEDICINE

## 2019-03-29 PROCEDURE — 85025 COMPLETE CBC W/AUTO DIFF WBC: CPT

## 2019-03-29 PROCEDURE — 71046 X-RAY EXAM CHEST 2 VIEWS: CPT

## 2019-03-29 PROCEDURE — 6370000000 HC RX 637 (ALT 250 FOR IP): Performed by: INTERNAL MEDICINE

## 2019-03-29 PROCEDURE — 84484 ASSAY OF TROPONIN QUANT: CPT

## 2019-03-29 PROCEDURE — 94760 N-INVAS EAR/PLS OXIMETRY 1: CPT

## 2019-03-29 RX ORDER — NICOTINE POLACRILEX 4 MG
15 LOZENGE BUCCAL PRN
Status: DISCONTINUED | OUTPATIENT
Start: 2019-03-29 | End: 2019-03-31 | Stop reason: HOSPADM

## 2019-03-29 RX ORDER — DEXTROSE MONOHYDRATE 50 MG/ML
100 INJECTION, SOLUTION INTRAVENOUS PRN
Status: DISCONTINUED | OUTPATIENT
Start: 2019-03-29 | End: 2019-03-31 | Stop reason: HOSPADM

## 2019-03-29 RX ORDER — DILTIAZEM HYDROCHLORIDE 5 MG/ML
10 INJECTION INTRAVENOUS ONCE
Status: COMPLETED | OUTPATIENT
Start: 2019-03-29 | End: 2019-03-29

## 2019-03-29 RX ORDER — TRAMADOL HYDROCHLORIDE 50 MG/1
50 TABLET ORAL EVERY 6 HOURS PRN
Status: DISCONTINUED | OUTPATIENT
Start: 2019-03-29 | End: 2019-03-31 | Stop reason: HOSPADM

## 2019-03-29 RX ORDER — SODIUM CHLORIDE 0.9 % (FLUSH) 0.9 %
10 SYRINGE (ML) INJECTION EVERY 12 HOURS SCHEDULED
Status: DISCONTINUED | OUTPATIENT
Start: 2019-03-29 | End: 2019-03-31 | Stop reason: HOSPADM

## 2019-03-29 RX ORDER — ONDANSETRON 2 MG/ML
4 INJECTION INTRAMUSCULAR; INTRAVENOUS EVERY 4 HOURS PRN
Status: DISCONTINUED | OUTPATIENT
Start: 2019-03-29 | End: 2019-03-31 | Stop reason: HOSPADM

## 2019-03-29 RX ORDER — ASPIRIN 81 MG/1
81 TABLET ORAL DAILY
Status: DISCONTINUED | OUTPATIENT
Start: 2019-03-30 | End: 2019-03-31 | Stop reason: HOSPADM

## 2019-03-29 RX ORDER — ATORVASTATIN CALCIUM 10 MG/1
10 TABLET, FILM COATED ORAL NIGHTLY
Status: DISCONTINUED | OUTPATIENT
Start: 2019-03-29 | End: 2019-03-31 | Stop reason: HOSPADM

## 2019-03-29 RX ORDER — DEXTROSE MONOHYDRATE 25 G/50ML
12.5 INJECTION, SOLUTION INTRAVENOUS PRN
Status: DISCONTINUED | OUTPATIENT
Start: 2019-03-29 | End: 2019-03-31 | Stop reason: HOSPADM

## 2019-03-29 RX ORDER — LISINOPRIL AND HYDROCHLOROTHIAZIDE 20; 12.5 MG/1; MG/1
1 TABLET ORAL 2 TIMES DAILY
Status: DISCONTINUED | OUTPATIENT
Start: 2019-03-29 | End: 2019-03-29 | Stop reason: CLARIF

## 2019-03-29 RX ORDER — ACETAMINOPHEN 80 MG
TABLET,CHEWABLE ORAL
Status: COMPLETED
Start: 2019-03-29 | End: 2019-03-29

## 2019-03-29 RX ORDER — SIMVASTATIN 10 MG
10 TABLET ORAL NIGHTLY
Status: DISCONTINUED | OUTPATIENT
Start: 2019-03-29 | End: 2019-03-29 | Stop reason: CLARIF

## 2019-03-29 RX ORDER — FAMOTIDINE 20 MG/1
20 TABLET, FILM COATED ORAL 2 TIMES DAILY
Status: DISCONTINUED | OUTPATIENT
Start: 2019-03-29 | End: 2019-03-31 | Stop reason: HOSPADM

## 2019-03-29 RX ORDER — LISINOPRIL 20 MG/1
20 TABLET ORAL 2 TIMES DAILY
Status: DISCONTINUED | OUTPATIENT
Start: 2019-03-29 | End: 2019-03-30

## 2019-03-29 RX ORDER — HYDROCHLOROTHIAZIDE 25 MG/1
12.5 TABLET ORAL 2 TIMES DAILY
Status: DISCONTINUED | OUTPATIENT
Start: 2019-03-29 | End: 2019-03-30

## 2019-03-29 RX ORDER — SODIUM CHLORIDE 0.9 % (FLUSH) 0.9 %
10 SYRINGE (ML) INJECTION PRN
Status: DISCONTINUED | OUTPATIENT
Start: 2019-03-29 | End: 2019-03-31 | Stop reason: HOSPADM

## 2019-03-29 RX ORDER — SODIUM CHLORIDE 9 MG/ML
INJECTION, SOLUTION INTRAVENOUS CONTINUOUS
Status: DISCONTINUED | OUTPATIENT
Start: 2019-03-29 | End: 2019-03-30

## 2019-03-29 RX ORDER — ACETAMINOPHEN 325 MG/1
650 TABLET ORAL EVERY 4 HOURS PRN
Status: DISCONTINUED | OUTPATIENT
Start: 2019-03-29 | End: 2019-03-31 | Stop reason: HOSPADM

## 2019-03-29 RX ADMIN — ENOXAPARIN SODIUM 70 MG: 80 INJECTION SUBCUTANEOUS at 15:07

## 2019-03-29 RX ADMIN — LISINOPRIL 20 MG: 20 TABLET ORAL at 21:46

## 2019-03-29 RX ADMIN — DILTIAZEM HYDROCHLORIDE 5 MG/HR: 5 INJECTION INTRAVENOUS at 19:36

## 2019-03-29 RX ADMIN — ATORVASTATIN CALCIUM 10 MG: 10 TABLET, FILM COATED ORAL at 21:46

## 2019-03-29 RX ADMIN — DILTIAZEM HYDROCHLORIDE 10 MG: 5 INJECTION INTRAVENOUS at 15:07

## 2019-03-29 RX ADMIN — ENOXAPARIN SODIUM 70 MG: 80 INJECTION SUBCUTANEOUS at 22:09

## 2019-03-29 RX ADMIN — SODIUM CHLORIDE: 9 INJECTION, SOLUTION INTRAVENOUS at 19:36

## 2019-03-29 RX ADMIN — METOPROLOL TARTRATE 25 MG: 25 TABLET, FILM COATED ORAL at 21:46

## 2019-03-29 RX ADMIN — HYDROCHLOROTHIAZIDE 12.5 MG: 25 TABLET ORAL at 21:46

## 2019-03-29 RX ADMIN — Medication 10 ML: at 21:46

## 2019-03-29 RX ADMIN — Medication 10 ML: at 19:36

## 2019-03-29 RX ADMIN — Medication: at 21:54

## 2019-03-29 RX ADMIN — FAMOTIDINE 20 MG: 20 TABLET ORAL at 21:46

## 2019-03-29 ASSESSMENT — ENCOUNTER SYMPTOMS
CHEST TIGHTNESS: 0
NAUSEA: 0
VOMITING: 0
SHORTNESS OF BREATH: 1
ABDOMINAL PAIN: 0
DIARRHEA: 0

## 2019-03-29 ASSESSMENT — PAIN SCALES - GENERAL: PAINLEVEL_OUTOF10: 0

## 2019-03-29 NOTE — ED NOTES
Report given to Cassandra Kwon RN at bedside. Pt alert and oriented and shows no signs of distress at time of transfer to . Pt taken to room by Nurse in wheelchair.      Augustine Bolden RN  03/29/19 5058

## 2019-03-29 NOTE — ED PROVIDER NOTES
[address] [facility]  eMERGENCY dEPARTMENT eNCOUnter        Pt Name: Ian Ricks  MRN: [id number]  Jusgfvinicius 1950  Date of evaluation: 3/29/2019  Provider: ИВАН Walton CNP  PCP: Denzel Mora    This patient was seen and evaluated by the attending physician Ricardo Baca MD.      [facility]       Chief Complaint   Patient presents with    Palpitations     pt in by emre gomez from home with c/o continued palpitations- pt states was seen here and did not want to stay- signed out- palpitations had gone- now back- unable to see cards till next week        HISTORY OF PRESENT ILLNESS   (Location/Symptom, Timing/Onset, Context/Setting, Quality, Duration, Modifying Factors, Severity)  Note limiting factors. Ian Ricks is a 79 y.o. female presents to the emergency department complaining of continued palpitations. The patient was seen in the hospital on Wednesday and did sign out against medical advice. She reports that she has history of irregular heartbeat but describes more PVC type skips, she is compliant with her beta blocker. Denies history of atrial fibrillation or atrial flutter. The patient is found to be initially in atrial flutter upon arrival to the ER that transitions in and out of atrial fibrillation. Patient at this point is A. fib with RVR with a rate of 133. She reports that she is symptomatic with palpitations and some shortness of breath. The patient is an every day cigarette smoker. Denies any recent travel or sick exposure. Denies any headache, fever, lightheadedness, dizziness, visual disturbances. No chest pain or pressure. No neck or back pain. No abdominal pain, nausea, vomiting, diarrhea, constipation, or dysuria. No rash. Nursing Notes were all reviewed and agreed with or any disagreements were addressed  in the HPI.     REVIEW OF SYSTEMS    (2-9 systems for level 4, 10 or more for level 5)     Review of Systems   Constitutional: Negative for activity change, chills and fever. Respiratory: Positive for shortness of breath. Negative for chest tightness. Cardiovascular: Positive for palpitations. Negative for chest pain. Gastrointestinal: Negative for abdominal pain, diarrhea, nausea and vomiting. Genitourinary: Negative for dysuria. All other systems reviewed and are negative. Positives and Pertinent negatives as per HPI. Except as noted abovein the ROS, all other systems were reviewed and negative. PAST MEDICAL HISTORY     Past Medical History:   Diagnosis Date    CAD (coronary artery disease)     Diabetes mellitus ([facility] [address]     Hyperlipidemia     Hypertension          SURGICAL HISTORY     Past Surgical History:   Procedure Laterality Date    CARDIAC SURGERY  2001    stent    CHOLECYSTECTOMY      CORONARY ARTERY BYPASS GRAFT  1993    GASTRIC BYPASS SURGERY           CURRENTMEDICATIONS       Previous Medications    AMLODIPINE (NORVASC) 10 MG TABLET    TAKE 1 TABLET BY MOUTH EVERY DAY    ASPIRIN 81 MG TABLET    Take 1 tablet by mouth daily. CALCIUM CARBONATE (OSCAL) 500 MG TABS TABLET    Take 600 mg by mouth 2 times daily. FERROUS SULFATE 325 (65 FE) MG TABLET    Take 325 mg by mouth daily (with breakfast). IBUPROFEN (ADVIL;MOTRIN) 800 MG TABLET    Take 800 mg by mouth every 8 hours as needed. LISINOPRIL-HYDROCHLOROTHIAZIDE (PRINZIDE;ZESTORETIC) 20-12.5 MG PER TABLET    Take 1 tablet by mouth 2 times daily. LOVASTATIN (MEVACOR) 20 MG TABLET    Take 20 mg by mouth nightly. METFORMIN (GLUCOPHAGE) 850 MG TABLET    Take 850 mg by mouth 2 times daily (with meals). METOPROLOL (LOPRESSOR) 25 MG TABLET    Take 25 mg by mouth 2 times daily. MULTIPLE VITAMIN (MULTIVITAMIN PO)    Take 1 tablet by mouth daily.     POTASSIUM CHLORIDE (KLOR-CON M) 20 MEQ EXTENDED RELEASE TABLET    Take 1 tablet by mouth daily for 7 days    TRAMADOL (ULTRAM) 50 MG TABLET    Take 50 mg by mouth every 6 hours as needed. VITAMIN B-12 (CYANOCOBALAMIN) 1000 MCG TABLET    Take 500 mcg by mouth daily.          ALLERGIES     Codeine and Demerol    FAMILYHISTORY       Family History   Problem Relation Age of Onset    Cancer Mother     Diabetes Father     Heart Disease Father     Cancer Father     Cancer Maternal Aunt           SOCIAL HISTORY       Social History     Socioeconomic History    Marital status:      Spouse name: None    Number of children: None    Years of education: None    Highest education level: None   Occupational History    None   Social Needs    Financial resource strain: None    Food insecurity:     Worry: None     Inability: None    Transportation needs:     Medical: None     Non-medical: None   Tobacco Use    Smoking status: Former Smoker     Packs/day: 1.00     Years: 30.00     Pack years: 30.00    Smokeless tobacco: Never Used    Tobacco comment: quit 10/2007   Substance and Sexual Activity    Alcohol use: No     Alcohol/week: 0.0 oz    Drug use: No    Sexual activity: None   Lifestyle    Physical activity:     Days per week: None     Minutes per session: None    Stress: None   Relationships    Social connections:     Talks on phone: None     Gets together: None     Attends Hoahaoism service: None     Active member of club or organization: None     Attends meetings of clubs or organizations: None     Relationship status: None    Intimate partner violence:     Fear of current or ex partner: None     Emotionally abused: None     Physically abused: None     Forced sexual activity: None   Other Topics Concern    None   Social History Narrative    None       SCREENINGS             PHYSICAL EXAM    (up to 7 for level 4, 8 or more for level 5)     ED Triage Vitals [03/29/19 1332]   BP Temp Temp Source Pulse Resp SpO2 Height Weight   (!) 126/56 97.1 °F (36.2 °C) Oral 92 16 95 % 5' (1.524 m) 145 lb (65.8 kg)       Physical Exam   Constitutional: She is Phone [phone]   PROTIME-INR    Narrative:     Performed at:  [facility] [facility] [address] [address] [facility] [address]   Phone [phone]       All other labs were within normal range or not returned as of this dictation. EKG: All EKG's are interpreted by the Emergency Department Physician who either signs orCo-signs this chart in the absence of a cardiologist.  Please see their note for interpretation of EKG. RADIOLOGY:   Non-plain film images such as CT, Ultrasound and MRI are read by the radiologist. Plain radiographic images are visualized andpreliminarily interpreted by the  ED Provider with the below findings:        Interpretation [facility] Radiologist below, if available at the time of this note:    XR CHEST STANDARD (2 VW)    (Results Pending)     No results found. PROCEDURES   Unless otherwise noted below, none     Procedures    CRITICAL CARE TIME   The total critical care time spent while evaluating and treating this patient was at least 31 minutes. This excludes time spent doing separately billable procedures. This includes time at the bedside, data interpretation, medication management, obtaining critical history from collateral sources if the patient is unable to provide it directly, and physician consultation. Specifics of interventions taken and potentially life-threatening diagnostic considerations are listed above in the medical decision making.       CONSULTS:  None      EMERGENCY DEPARTMENT COURSE and DIFFERENTIALDIAGNOSIS/MDM:   Vitals:    Vitals:    03/29/19 1401 03/29/19 1406 03/29/19 1420 03/29/19 1429   BP:  128/64 (!) 106/59    Pulse: 112  88 111   Resp: 21  15    Temp:       TempSrc:       SpO2: 96%  96%    Weight:       Height:           Patient was given thefollowing medications:  Medications   diltiazem injection 10 mg (has no administration in time range)     Followed by   diltiazem 125 mg in dextrose 5 % 125 mL infusion (has no administration in time range)       Briefly, this is a 79year old female that  presents to the emergency department complaining of continued palpitations. The patient was seen in the hospital on Wednesday and did sign out against medical advice. She reports that she has history of irregular heartbeat but describes more PVC type skips, she is compliant with her beta blocker. Denies history of atrial fibrillation or atrial flutter. The patient is found to be initially in atrial flutter upon arrival to the ER that transitions in and out of atrial fibrillation. Patient at this point is A. fib with RVR with a rate of 133. She reports that she is symptomatic with palpitations and some shortness of breath. The patient is an every day cigarette smoker. Denies any recent travel or sick exposure. Cardizem was ordered for A. fib with RVR with a rate of 123. CBC is unremarkable. CMP unremarkable. BNP 1080. Troponin negative. INR 0.91. She is anticoagulated with Lovenox in the ER. Plan to admit to hospitalist services, the patient is agreeable regarding plan of care. FINAL IMPRESSION      1. Atrial fibrillation, unspecified type (HCC)    2. Palpitations    3. Shortness of breath          DISPOSITION/PLAN   DISPOSITION        PATIENT REFERREDTO:  No follow-up provider specified.     DISCHARGE MEDICATIONS:  New Prescriptions    No medications on file       DISCONTINUED MEDICATIONS:  Discontinued Medications    No medications on file              (Please note that portions ofthis note were completed with a voice recognition program.  Efforts were made to edit the dictations but occasionally words are mis-transcribed.)    ИВАН Turcios CNP (electronically signed)           ИВАН Turcios CNP  03/29/19 0861

## 2019-03-29 NOTE — ED NOTES
Patient resting in bed. No new assessment data at this time. Patient on monitor with audible alarms. Updated on plan of care. Denies needs at this time. Will continue to monitor.         Junior Chinchilla RN  03/29/19 0644

## 2019-03-29 NOTE — ED NOTES
Patient heart rate remains in 70-80's at this time. Diltiazem infusion held at this time due to heart rate less than 100. Patient has no complaints at this time. Resting in bed quietly. Will continue to monitor.       Harpreet Evans RN  03/29/19 3489

## 2019-03-29 NOTE — PROGRESS NOTES
Pharmacy called to inform them no meds showed up in pyxis for her. They will fix problem. Will start GTT soon. Resting in bed at this time, brother in room with her.

## 2019-03-29 NOTE — ED NOTES
Bed: 08  Expected date:   Expected time:   Means of arrival: [facility]  Comments:  Indira Cisse RN  03/29/19 2668

## 2019-03-29 NOTE — ED PROVIDER NOTES
I independently performed a history and physical on [address] [place] [address]. All diagnostic, treatment, and disposition decisions were made by myself in conjunction with the mid-level provider. Patient has had multiple periods of palpitations for the last 48 hours, is found to be in a flutter, she's had this before with electrolyte abnormalities, was offered admission but declined 2 days ago when originally seen. It just won't go away and she feels lightheaded and weak like she may faint when he goes fast.    For further details of [facility] emergency department encounter, please see Marilyn's documentation. The Ekg interpreted by me shows  atrial flutter with 4:1 block with a rate of 80  Axis is   Normal  QTc is  normal  Intervals and Durations are unremarkable.       ST Segments: normal  No significant change from prior EKG dated March 27, 2019    Results for orders placed or performed during the hospital encounter of 03/29/19   CBC Auto Differential   Result Value Ref Range    WBC 8.2 4.0 - 11.0 K/uL    RBC 4.36 4.00 - 5.20 M/uL    Hemoglobin 13.2 12.0 - 16.0 g/dL    Hematocrit 39.0 36.0 - 48.0 %    MCV 89.5 80.0 - 100.0 fL    MCH 30.3 26.0 - 34.0 pg    MCHC 33.9 31.0 - 36.0 g/dL    RDW 12.6 12.4 - 15.4 %    Platelets 032 800 - 527 K/uL    MPV 6.8 5.0 - 10.5 fL    PLATELET SLIDE REVIEW Adequate     SLIDE REVIEW see below     Neutrophils % 74.0 %    Lymphocytes % 12.0 %    Monocytes % 11.0 %    Eosinophils % 0.0 %    Basophils % 0.0 %    Neutrophils # 6.1 1.7 - 7.7 K/uL    Lymphocytes # 1.2 1.0 - 5.1 K/uL    Monocytes # 0.9 0.0 - 1.3 K/uL    Eosinophils # 0.0 0.0 - 0.6 K/uL    Basophils # 0.0 0.0 - 0.2 K/uL    Atypical Lymphocytes Relative 3 0 - 6 %    RBC Morphology Normal    Comprehensive metabolic panel   Result Value Ref Range    Sodium 133 (L) 136 - 145 mmol/L    Potassium 5.1 3.5 - 5.1 mmol/L    Chloride 97 (L) 99 - 110 mmol/L    CO2 21 21 - 32 mmol/L    Anion Gap 15 3 - 16    Glucose 120 (H) 70 - 99

## 2019-03-29 NOTE — ED NOTES
Pt alert and oriented, complaints of racing heart, states no chest pain but does have sob at times, heart rate irregular with rate in 120-150's, S1, S2 heard. Cap refill less than three seconds, radial pulse 2+, no signs of edema or JVD and lungs sounds clear. IV established and blood sent to lab. Denies needs at this time. Will continue to monitor.       Camila Severe, RN  03/29/19 2590

## 2019-03-30 LAB
ANION GAP SERPL CALCULATED.3IONS-SCNC: 12 MMOL/L (ref 3–16)
BASOPHILS ABSOLUTE: 0 K/UL (ref 0–0.2)
BASOPHILS RELATIVE PERCENT: 0.4 %
BUN BLDV-MCNC: 8 MG/DL (ref 7–20)
CALCIUM SERPL-MCNC: 8.8 MG/DL (ref 8.3–10.6)
CHLORIDE BLD-SCNC: 101 MMOL/L (ref 99–110)
CO2: 24 MMOL/L (ref 21–32)
CREAT SERPL-MCNC: 0.6 MG/DL (ref 0.6–1.2)
EOSINOPHILS ABSOLUTE: 0.1 K/UL (ref 0–0.6)
EOSINOPHILS RELATIVE PERCENT: 0.7 %
GFR AFRICAN AMERICAN: >60
GFR NON-AFRICAN AMERICAN: >60
GLUCOSE BLD-MCNC: 107 MG/DL (ref 70–99)
GLUCOSE BLD-MCNC: 108 MG/DL (ref 70–99)
GLUCOSE BLD-MCNC: 135 MG/DL (ref 70–99)
GLUCOSE BLD-MCNC: 95 MG/DL (ref 70–99)
HCT VFR BLD CALC: 36.2 % (ref 36–48)
HEMOGLOBIN: 12.4 G/DL (ref 12–16)
LV EF: 55 %
LVEF MODALITY: NORMAL
LYMPHOCYTES ABSOLUTE: 2.2 K/UL (ref 1–5.1)
LYMPHOCYTES RELATIVE PERCENT: 26.9 %
MAGNESIUM: 1.4 MG/DL (ref 1.8–2.4)
MCH RBC QN AUTO: 30.1 PG (ref 26–34)
MCHC RBC AUTO-ENTMCNC: 34.2 G/DL (ref 31–36)
MCV RBC AUTO: 88.1 FL (ref 80–100)
MONOCYTES ABSOLUTE: 0.6 K/UL (ref 0–1.3)
MONOCYTES RELATIVE PERCENT: 7.9 %
NEUTROPHILS ABSOLUTE: 5.2 K/UL (ref 1.7–7.7)
NEUTROPHILS RELATIVE PERCENT: 64.1 %
PDW BLD-RTO: 12.7 % (ref 12.4–15.4)
PERFORMED ON: ABNORMAL
PHOSPHORUS: 3.3 MG/DL (ref 2.5–4.9)
PLATELET # BLD: 419 K/UL (ref 135–450)
PMV BLD AUTO: 7.1 FL (ref 5–10.5)
POTASSIUM REFLEX MAGNESIUM: 4.5 MMOL/L (ref 3.5–5.1)
RBC # BLD: 4.1 M/UL (ref 4–5.2)
SODIUM BLD-SCNC: 137 MMOL/L (ref 136–145)
T4 FREE: 1.4 NG/DL (ref 0.9–1.8)
TSH SERPL DL<=0.05 MIU/L-ACNC: 1.53 UIU/ML (ref 0.27–4.2)
WBC # BLD: 8.1 K/UL (ref 4–11)

## 2019-03-30 PROCEDURE — 36415 COLL VENOUS BLD VENIPUNCTURE: CPT

## 2019-03-30 PROCEDURE — 6370000000 HC RX 637 (ALT 250 FOR IP): Performed by: INTERNAL MEDICINE

## 2019-03-30 PROCEDURE — 2580000003 HC RX 258: Performed by: INTERNAL MEDICINE

## 2019-03-30 PROCEDURE — 84100 ASSAY OF PHOSPHORUS: CPT

## 2019-03-30 PROCEDURE — 6360000002 HC RX W HCPCS: Performed by: INTERNAL MEDICINE

## 2019-03-30 PROCEDURE — 2060000000 HC ICU INTERMEDIATE R&B

## 2019-03-30 PROCEDURE — 84439 ASSAY OF FREE THYROXINE: CPT

## 2019-03-30 PROCEDURE — 80048 BASIC METABOLIC PNL TOTAL CA: CPT

## 2019-03-30 PROCEDURE — 83036 HEMOGLOBIN GLYCOSYLATED A1C: CPT

## 2019-03-30 PROCEDURE — 84443 ASSAY THYROID STIM HORMONE: CPT

## 2019-03-30 PROCEDURE — 85025 COMPLETE CBC W/AUTO DIFF WBC: CPT

## 2019-03-30 PROCEDURE — 83735 ASSAY OF MAGNESIUM: CPT

## 2019-03-30 PROCEDURE — 93306 TTE W/DOPPLER COMPLETE: CPT

## 2019-03-30 PROCEDURE — 6370000000 HC RX 637 (ALT 250 FOR IP): Performed by: NURSE PRACTITIONER

## 2019-03-30 RX ORDER — DILTIAZEM HYDROCHLORIDE 60 MG/1
60 TABLET, FILM COATED ORAL EVERY 6 HOURS SCHEDULED
Status: DISCONTINUED | OUTPATIENT
Start: 2019-03-30 | End: 2019-03-30

## 2019-03-30 RX ORDER — IBUPROFEN 200 MG
400 TABLET ORAL EVERY 6 HOURS PRN
Status: DISCONTINUED | OUTPATIENT
Start: 2019-03-30 | End: 2019-03-30

## 2019-03-30 RX ORDER — LISINOPRIL 20 MG/1
20 TABLET ORAL DAILY
Status: DISCONTINUED | OUTPATIENT
Start: 2019-03-30 | End: 2019-03-31 | Stop reason: HOSPADM

## 2019-03-30 RX ORDER — DILTIAZEM HYDROCHLORIDE 60 MG/1
60 TABLET, FILM COATED ORAL EVERY 6 HOURS
Status: DISCONTINUED | OUTPATIENT
Start: 2019-03-30 | End: 2019-03-30

## 2019-03-30 RX ORDER — METOPROLOL TARTRATE 50 MG/1
50 TABLET, FILM COATED ORAL 2 TIMES DAILY
Status: DISCONTINUED | OUTPATIENT
Start: 2019-03-30 | End: 2019-03-31 | Stop reason: HOSPADM

## 2019-03-30 RX ADMIN — Medication 10 ML: at 09:25

## 2019-03-30 RX ADMIN — LISINOPRIL 20 MG: 20 TABLET ORAL at 19:01

## 2019-03-30 RX ADMIN — FAMOTIDINE 20 MG: 20 TABLET ORAL at 22:30

## 2019-03-30 RX ADMIN — METOPROLOL TARTRATE 50 MG: 50 TABLET, FILM COATED ORAL at 09:25

## 2019-03-30 RX ADMIN — ASPIRIN 81 MG: 81 TABLET, COATED ORAL at 09:25

## 2019-03-30 RX ADMIN — APIXABAN 5 MG: 5 TABLET, FILM COATED ORAL at 19:01

## 2019-03-30 RX ADMIN — FAMOTIDINE 20 MG: 20 TABLET ORAL at 09:25

## 2019-03-30 RX ADMIN — ATORVASTATIN CALCIUM 10 MG: 10 TABLET, FILM COATED ORAL at 19:01

## 2019-03-30 RX ADMIN — IBUPROFEN 400 MG: 200 TABLET, FILM COATED ORAL at 00:31

## 2019-03-30 RX ADMIN — Medication 10 ML: at 19:01

## 2019-03-30 RX ADMIN — SODIUM CHLORIDE: 9 INJECTION, SOLUTION INTRAVENOUS at 12:20

## 2019-03-30 RX ADMIN — ENOXAPARIN SODIUM 70 MG: 80 INJECTION SUBCUTANEOUS at 09:25

## 2019-03-30 RX ADMIN — METOPROLOL TARTRATE 50 MG: 50 TABLET, FILM COATED ORAL at 22:30

## 2019-03-30 ASSESSMENT — PAIN DESCRIPTION - PAIN TYPE
TYPE: CHRONIC PAIN
TYPE: CHRONIC PAIN

## 2019-03-30 ASSESSMENT — PAIN DESCRIPTION - LOCATION
LOCATION: GENERALIZED
LOCATION: GENERALIZED

## 2019-03-30 ASSESSMENT — PAIN SCALES - GENERAL
PAINLEVEL_OUTOF10: 7
PAINLEVEL_OUTOF10: 5

## 2019-03-30 NOTE — PLAN OF CARE
Problem:  Activity:  Goal: Ability to tolerate increased activity will improve  Description  Ability to tolerate increased activity will improve  Outcome: Ongoing     Problem: Cardiac:  Goal: Ability to maintain an adequate cardiac output will improve  Description  Ability to maintain an adequate cardiac output will improve  Outcome: Ongoing     Problem: Safety:  Goal: Ability to remain free from injury will improve  Description  Ability to remain free from injury will improve  Outcome: Ongoing     Problem: Pain:  Goal: Pain level will decrease  Description  Pain level will decrease  Outcome: Ongoing

## 2019-03-30 NOTE — PROGRESS NOTES
Admitted with a-flutter and RVR, back and fourth on monitor so went ahead and started cardizem Gtt @ 5. Rate controlled. Metformin and calcium not ordered per hospitalist. Paged to ask. \"stated we don't order these inpatient\" explained to pt. Anticipates BG rising since no antidiabetic meds taken since this am. Okay to take Humalog in am not tonight.

## 2019-03-30 NOTE — PROGRESS NOTES
Pt converted to NSR at this time. Will continue cardizem drip due to pts HR occasionally jumping into the 100's. Waiting for cardio to consult this AM. Will start titration when HR is stable.

## 2019-03-30 NOTE — PROGRESS NOTES
1200- Pt returned to floor from echo. HR in 60's with Cardizem gtt @ 5mg/hr. Decreased to 2.5 mg/hr. Then turned off @ [phone]. PO cardizem to be given. Addendum 1229- PO Cardizem held because HR on monitor is high 50's (58). Will continue to monitor.

## 2019-03-30 NOTE — PROGRESS NOTES
Hospital Medicine Progress Note     Date:  3/30/2019    PCP: Beena Elizabeth (Tel: [phone])    Date of Admission: 3/29/2019    Chief complaint:   Chief Complaint   Patient presents with    Palpitations     pt in by emre gomez from home with c/o continued palpitations- pt states was seen here and did not want to stay- signed out- palpitations had gone- now back- unable to see cards till next week        Brief hospital course: 60-year-old female with history of essential hypertension, hyperlipidemia, CAD, diabetes type 2, osteoarthritis, who presents to the emergency room with complaints of palpitations, noted to be in atrial flutter which rapid ventricular response. She was started on Cardizem infusion and she has since converted to sinus rhythm. She was on metoprolol 25 mg twice a day, which has since been increased to 50 mg twice a day and heart rate remains controlled. She was initially on subcutaneous Lovenox for anticoagulation, started this admission; this has been changed to eliquis 5 mg twice a day. TSH was checked and was unremarkable. Cardiogram was unremarkable, except for mild tricuspid regurgitation, mild pulmonic regurgitation and aortic valve sclerosis. Assessment:  Principal Problem:    Atrial flutter with rapid ventricular response (HCC)  Active Problems:    CAD (coronary artery disease)    Essential hypertension    Hyperlipidemia    Diabetes mellitus type 2, controlled ([address]  Resolved Problems:    * No resolved hospital problems. *        Plan:  1. Atrial flutter with rapid ventricular response. Was on Cardizem infusion, which has since been weaned off since increasing metoprolol from 25 mg twice a day to 50 mg twice a day. Continue liquids. TSH normal.  Echocardiogram unremarkable for significant valvular disease. Awaiting cardiology evaluation prior to discharge. Recheck electrolytes in the morning. 2. Essential hypertension.   Continue metoprolol and lisinopril as ordered. Monitor blood pressure closely; consider restarting home dose of hydrochlorothiazide if needed. 3. Diabetes type 2, controlled. Check hemoglobin A1c. Continue sliding scale insulin. Monitor Accu-Cheks closely. Diet: DIET CARB CONTROL;    Code status: Full Code     ----------  Subjective  Complaints at this time. She denies chest pain or shortness of breath. Objective  Physical exam:  Vitals: BP (!) 153/75   Pulse 61   Temp 96.8 °F (36 °C) (Temporal)   Resp 20   Ht 5' (1.524 m)   Wt 145 lb 11.2 oz (66.1 kg)   SpO2 94%   BMI 28.46 kg/m²   Gen/overall appearance: Not in acute distress. Alert. Oriented ×3. Head: Normocephalic, atraumatic  Eyes: EOMI, good acuity  ENT: Oral mucosa moist  Neck: No JVD, thyromegaly  CVS: Nml S1S2, no MRG, RRR  Pulm: Clear bilaterally. No crackles/wheezes  Gastrointestinal: Soft, NT/ND, +BS  Musculoskeletal: No edema. Warm  Neuro: No focal deficit. Moves extremity spontaneously. Psychiatry: Appropriate affect. Not agitated. Skin: Warm, dry with normal turgor. No rash  Capillary refill: Brisk,< 3 seconds   Peripheral Pulses: +2 palpable, equal bilaterally       24HR INTAKE/OUTPUT:      Intake/Output Summary (Last 24 hours) at 3/30/2019 1724  Last data filed at 3/30/2019 1330  Gross per 24 hour   Intake 715 ml   Output --   Net 715 ml     I/O last 3 completed shifts: In: 100 [P.O.:705; I.V.:10]  Out: -   No intake/output data recorded.       Meds:    metoprolol tartrate  50 mg Oral BID    apixaban  5 mg Oral BID    aspirin  81 mg Oral Daily    sodium chloride flush  10 mL Intravenous 2 times per day    famotidine  20 mg Oral BID    insulin lispro  0-12 Units Subcutaneous TID WC    insulin lispro  0-6 Units Subcutaneous Nightly    atorvastatin  10 mg Oral Nightly       Infusions:    dextrose           PRN Meds: sodium chloride flush, magnesium hydroxide, ondansetron, glucose, dextrose, glucagon (rDNA), dextrose, acetaminophen, traMADol    Labs/imaging:  CBC:   Recent Labs     03/1950 03/29/19  1357 03/30/19  0512   WBC 7.7 8.2 8.1   HGB 13.3 13.2 12.4    397 419         BMP:    Recent Labs     03/1950 03/27/19 2121 03/29/19  1357 03/30/19  0512   *  --  133* 137   K 5.5* 4.8 5.1 4.5   CL 94*  --  97* 101   CO2 20*  --  21 24   BUN 18  --  11 8   CREATININE 1.0  --  0.6 0.6   GLUCOSE 158*  --  120* 95         Hepatic:   Recent Labs     03/1950 03/29/19  1357   AST 26 18   ALT 12 11   BILITOT <0.2 <0.2   ALKPHOS 97 85       Troponin:   Recent Labs     03/1950 03/29/19  1357   TROPONINI <0.01 <0.01         BNP: No results for input(s): BNP in the last 72 hours.       INR:   Recent Labs     03/1950 03/29/19  1357   INR 0.94 0.91         Delfino Terrazas MD  -------------------------------  [facility]

## 2019-03-31 VITALS
HEART RATE: 83 BPM | BODY MASS INDEX: 28.66 KG/M2 | TEMPERATURE: 97.1 F | OXYGEN SATURATION: 96 % | DIASTOLIC BLOOD PRESSURE: 86 MMHG | HEIGHT: 60 IN | RESPIRATION RATE: 16 BRPM | SYSTOLIC BLOOD PRESSURE: 144 MMHG | WEIGHT: 146 LBS

## 2019-03-31 LAB
ALBUMIN SERPL-MCNC: 3.5 G/DL (ref 3.4–5)
ANION GAP SERPL CALCULATED.3IONS-SCNC: 13 MMOL/L (ref 3–16)
ATYPICAL LYMPHOCYTE RELATIVE PERCENT: 3 % (ref 0–6)
BANDED NEUTROPHILS RELATIVE PERCENT: 2 % (ref 0–7)
BASOPHILS ABSOLUTE: 0 K/UL (ref 0–0.2)
BASOPHILS RELATIVE PERCENT: 0 %
BUN BLDV-MCNC: 7 MG/DL (ref 7–20)
CALCIUM SERPL-MCNC: 8.6 MG/DL (ref 8.3–10.6)
CHLORIDE BLD-SCNC: 100 MMOL/L (ref 99–110)
CO2: 23 MMOL/L (ref 21–32)
CREAT SERPL-MCNC: 0.5 MG/DL (ref 0.6–1.2)
EOSINOPHILS ABSOLUTE: 0.1 K/UL (ref 0–0.6)
EOSINOPHILS RELATIVE PERCENT: 1 %
ESTIMATED AVERAGE GLUCOSE: 134.1 MG/DL
GFR AFRICAN AMERICAN: >60
GFR NON-AFRICAN AMERICAN: >60
GLUCOSE BLD-MCNC: 100 MG/DL (ref 70–99)
GLUCOSE BLD-MCNC: 103 MG/DL (ref 70–99)
GLUCOSE BLD-MCNC: 108 MG/DL (ref 70–99)
GLUCOSE BLD-MCNC: 92 MG/DL (ref 70–99)
HBA1C MFR BLD: 6.3 %
HCT VFR BLD CALC: 36 % (ref 36–48)
HEMOGLOBIN: 12.3 G/DL (ref 12–16)
LYMPHOCYTES ABSOLUTE: 1.6 K/UL (ref 1–5.1)
LYMPHOCYTES RELATIVE PERCENT: 19 %
MAGNESIUM: 1.5 MG/DL (ref 1.8–2.4)
MCH RBC QN AUTO: 30.3 PG (ref 26–34)
MCHC RBC AUTO-ENTMCNC: 34.3 G/DL (ref 31–36)
MCV RBC AUTO: 88.3 FL (ref 80–100)
MONOCYTES ABSOLUTE: 0.6 K/UL (ref 0–1.3)
MONOCYTES RELATIVE PERCENT: 8 %
NEUTROPHILS ABSOLUTE: 4.9 K/UL (ref 1.7–7.7)
NEUTROPHILS RELATIVE PERCENT: 67 %
PDW BLD-RTO: 12.4 % (ref 12.4–15.4)
PERFORMED ON: ABNORMAL
PHOSPHORUS: 3.2 MG/DL (ref 2.5–4.9)
PLATELET # BLD: 388 K/UL (ref 135–450)
PLATELET SLIDE REVIEW: ADEQUATE
PMV BLD AUTO: 7 FL (ref 5–10.5)
POTASSIUM SERPL-SCNC: 3.9 MMOL/L (ref 3.5–5.1)
RBC # BLD: 4.08 M/UL (ref 4–5.2)
RBC # BLD: NORMAL 10*6/UL
SLIDE REVIEW: ABNORMAL
SODIUM BLD-SCNC: 136 MMOL/L (ref 136–145)
TOXIC GRANULATION: PRESENT
WBC # BLD: 7.1 K/UL (ref 4–11)

## 2019-03-31 PROCEDURE — 2580000003 HC RX 258: Performed by: INTERNAL MEDICINE

## 2019-03-31 PROCEDURE — 80069 RENAL FUNCTION PANEL: CPT

## 2019-03-31 PROCEDURE — 6370000000 HC RX 637 (ALT 250 FOR IP): Performed by: INTERNAL MEDICINE

## 2019-03-31 PROCEDURE — 83735 ASSAY OF MAGNESIUM: CPT

## 2019-03-31 PROCEDURE — 85025 COMPLETE CBC W/AUTO DIFF WBC: CPT

## 2019-03-31 PROCEDURE — 6360000002 HC RX W HCPCS: Performed by: INTERNAL MEDICINE

## 2019-03-31 PROCEDURE — 36415 COLL VENOUS BLD VENIPUNCTURE: CPT

## 2019-03-31 RX ORDER — METOPROLOL TARTRATE 50 MG/1
50 TABLET, FILM COATED ORAL 2 TIMES DAILY
Qty: 60 TABLET | Refills: 2 | Status: SHIPPED | OUTPATIENT
Start: 2019-03-31

## 2019-03-31 RX ORDER — FAMOTIDINE 20 MG/1
20 TABLET, FILM COATED ORAL 2 TIMES DAILY
Qty: 60 TABLET | Refills: 3 | Status: SHIPPED | OUTPATIENT
Start: 2019-03-31

## 2019-03-31 RX ORDER — LISINOPRIL 20 MG/1
20 TABLET ORAL DAILY
Qty: 30 TABLET | Refills: 3 | Status: SHIPPED | OUTPATIENT
Start: 2019-04-01 | End: 2020-06-26 | Stop reason: DRUGHIGH

## 2019-03-31 RX ORDER — MAGNESIUM OXIDE 400 MG/1
400 TABLET ORAL DAILY
Qty: 30 TABLET | Refills: 1 | Status: SHIPPED | OUTPATIENT
Start: 2019-03-31

## 2019-03-31 RX ORDER — MAGNESIUM SULFATE IN WATER 40 MG/ML
4 INJECTION, SOLUTION INTRAVENOUS ONCE
Status: COMPLETED | OUTPATIENT
Start: 2019-03-31 | End: 2019-03-31

## 2019-03-31 RX ADMIN — APIXABAN 5 MG: 5 TABLET, FILM COATED ORAL at 09:38

## 2019-03-31 RX ADMIN — ACETAMINOPHEN 650 MG: 325 TABLET, FILM COATED ORAL at 00:41

## 2019-03-31 RX ADMIN — Medication 10 ML: at 09:39

## 2019-03-31 RX ADMIN — DILTIAZEM HYDROCHLORIDE 30 MG: 30 TABLET, FILM COATED ORAL at 09:39

## 2019-03-31 RX ADMIN — METOPROLOL TARTRATE 50 MG: 50 TABLET, FILM COATED ORAL at 09:38

## 2019-03-31 RX ADMIN — LISINOPRIL 20 MG: 20 TABLET ORAL at 09:39

## 2019-03-31 RX ADMIN — ASPIRIN 81 MG: 81 TABLET, COATED ORAL at 09:38

## 2019-03-31 RX ADMIN — FAMOTIDINE 20 MG: 20 TABLET ORAL at 09:38

## 2019-03-31 RX ADMIN — MAGNESIUM SULFATE HEPTAHYDRATE 4 G: 40 INJECTION, SOLUTION INTRAVENOUS at 09:38

## 2019-03-31 ASSESSMENT — PAIN DESCRIPTION - PAIN TYPE: TYPE: CHRONIC PAIN

## 2019-03-31 ASSESSMENT — PAIN SCALES - GENERAL
PAINLEVEL_OUTOF10: 4
PAINLEVEL_OUTOF10: 8

## 2019-03-31 ASSESSMENT — PAIN DESCRIPTION - LOCATION: LOCATION: GENERALIZED

## 2019-03-31 NOTE — PROGRESS NOTES
Data- discharge order received, pt verbalized agreement to discharge, disposition to previous residence, no needs for HHC/DME. Action- discharge instructions prepared and given to pt, pt verbalized understanding. Medication information packet given r/t NEW and/or CHANGED prescriptions emphasizing name/purpose/side effects, pt verbalized understanding. Discharge instruction summary: Diet- cardiac, Activity- as tolerated, Primary Care Physician as follows: Marshal Luu [phone] f/u appointment in one week, immunizations reviewed and documented, prescription medications filled at preferred pharmacy. Response- Pt belongings gathered, IV removed. Disposition is home (no HHC/DME needs), transported with friend, taken to lobby via w/c w/ RN, no complications.

## 2019-03-31 NOTE — PROGRESS NOTES
Assessment completed see doc flow sheets. VSS,  Patient reports generalized arithtic pain. Tylenol given per request, see MAR. Patient encouraged to call nurse if assistance is needed. Call light in reach .  LAINEY CanalesN, RN

## 2019-03-31 NOTE — DISCHARGE SUMMARY
Hospital Discharge Summary    Patient's PCP: Dennie Bang  Admit Date: 3/29/2019   Discharge Date: 3/31/2019    Admitting Physician: Dr. Donna Wayne MD  Discharge Physician: Dr. Marian Funk:   Mayela Rollins    Brief HPI: Patient admitted with atrial flutter with RVR. Brief hospital course: 57-year-old female with history of essential hypertension, hyperlipidemia, CAD, diabetes type 2, osteoarthritis, who presents to the emergency room with complaints of palpitations, noted to be in atrial flutter which rapid ventricular response. She was started on Cardizem infusion and she has since converted to sinus rhythm. She was on metoprolol 25 mg twice a day, which has since been increased to 50 mg twice a day and heart rate remains controlled. Added cardizem 30 mg Q8 this morning. She remains in sinus rhythm. She was initially on subcutaneous Lovenox for anticoagulation, started this admission; this has been changed to eliquis 5 mg twice a day. TSH was checked and was unremarkable. Cardiogram was unremarkable, except for mild tricuspid regurgitation, mild pulmonic regurgitation and aortic valve sclerosis. She has been advised to follow up with cardiology as previously scheduled for 4/4/2019. She has been advised to refrain from NSAID use while on eliquis. She has been counseled about the risk of bleeding associated with eliquis use and she is agreeable to medication use for stroke prevention. She had low mag of 1.5; replaced prior to discharge. She does have chronic hypomagnesemia historically, and she has been prescribed on mag oxide 400 mg daily. Discharged in stable condition. Invasive procedures:  None.     Discharge Diagnoses:   Principal Problem:    Atrial flutter with rapid ventricular response (HCC)  Active Problems:    CAD (coronary artery disease)    Essential hypertension    Hyperlipidemia    Diabetes mellitus type 2, controlled

## 2019-04-04 ENCOUNTER — OFFICE VISIT (OUTPATIENT)
Dept: CARDIOLOGY CLINIC | Age: 69
End: 2019-04-04
Payer: MEDICARE

## 2019-04-04 VITALS
HEART RATE: 62 BPM | SYSTOLIC BLOOD PRESSURE: 106 MMHG | OXYGEN SATURATION: 97 % | HEIGHT: 60 IN | DIASTOLIC BLOOD PRESSURE: 62 MMHG | BODY MASS INDEX: 28.86 KG/M2 | WEIGHT: 147 LBS | RESPIRATION RATE: 16 BRPM

## 2019-04-04 DIAGNOSIS — I10 ESSENTIAL HYPERTENSION: ICD-10-CM

## 2019-04-04 DIAGNOSIS — E78.2 MIXED HYPERLIPIDEMIA: ICD-10-CM

## 2019-04-04 DIAGNOSIS — I25.10 CORONARY ARTERY DISEASE INVOLVING NATIVE CORONARY ARTERY OF NATIVE HEART WITHOUT ANGINA PECTORIS: ICD-10-CM

## 2019-04-04 DIAGNOSIS — I48.92 ATRIAL FLUTTER WITH RAPID VENTRICULAR RESPONSE (HCC): Primary | ICD-10-CM

## 2019-04-04 PROCEDURE — 4040F PNEUMOC VAC/ADMIN/RCVD: CPT | Performed by: NURSE PRACTITIONER

## 2019-04-04 PROCEDURE — 1111F DSCHRG MED/CURRENT MED MERGE: CPT | Performed by: NURSE PRACTITIONER

## 2019-04-04 PROCEDURE — 4004F PT TOBACCO SCREEN RCVD TLK: CPT | Performed by: NURSE PRACTITIONER

## 2019-04-04 PROCEDURE — 1123F ACP DISCUSS/DSCN MKR DOCD: CPT | Performed by: NURSE PRACTITIONER

## 2019-04-04 PROCEDURE — 3017F COLORECTAL CA SCREEN DOC REV: CPT | Performed by: NURSE PRACTITIONER

## 2019-04-04 PROCEDURE — G8598 ASA/ANTIPLAT THER USED: HCPCS | Performed by: NURSE PRACTITIONER

## 2019-04-04 PROCEDURE — G8400 PT W/DXA NO RESULTS DOC: HCPCS | Performed by: NURSE PRACTITIONER

## 2019-04-04 PROCEDURE — G8419 CALC BMI OUT NRM PARAM NOF/U: HCPCS | Performed by: NURSE PRACTITIONER

## 2019-04-04 PROCEDURE — 99214 OFFICE O/P EST MOD 30 MIN: CPT | Performed by: NURSE PRACTITIONER

## 2019-04-04 PROCEDURE — 1090F PRES/ABSN URINE INCON ASSESS: CPT | Performed by: NURSE PRACTITIONER

## 2019-04-04 PROCEDURE — 93000 ELECTROCARDIOGRAM COMPLETE: CPT | Performed by: NURSE PRACTITIONER

## 2019-04-04 PROCEDURE — G8427 DOCREV CUR MEDS BY ELIG CLIN: HCPCS | Performed by: NURSE PRACTITIONER

## 2019-04-04 NOTE — PROGRESS NOTES
[address]     Outpatient Follow Up Note    CHIEF COMPLAINT / HPI: Follow Up secondary to coronary artery disease/ HTN/ Hyperlipidemia/ Carotid disease    Brief hospital course: 62-year-old female with history of essential hypertension, hyperlipidemia, CAD, diabetes type 2, osteoarthritis, who presents to the emergency room with complaints of palpitations, noted to be in atrial flutter which rapid ventricular response. She was started on Cardizem infusion and she has since converted to sinus rhythm. She was on metoprolol 25 mg twice a day, which has since been increased to 50 mg twice a day and heart rate remains controlled. Added cardizem 30 mg Q8 this morning. She remains in sinus rhythm. She was initially on subcutaneous Lovenox for anticoagulation, started this admission; this has been changed to eliquis 5 mg twice a day. TSH was checked and was unremarkable. Cardiogram was unremarkable, except for mild tricuspid regurgitation, mild pulmonic regurgitation and aortic valve sclerosis. She has been advised to follow up with cardiology as previously scheduled for 4/4/2019. She has been advised to refrain from NSAID use while on eliquis. She has been counseled about the risk of bleeding associated with eliquis use and she is agreeable to medication use for stroke prevention. She had low mag of 1.5; replaced prior to discharge. She does have chronic hypomagnesemia historically, and she has been prescribed on mag oxide 400 mg daily. Discharged in stable condition. Jenise Mckeon is 79 y.o. female who presents today for a routine follow up  related to the above mentioned issues. Subjective:   Ms. Hina Melendez is a 61year old female followed for her history of CAD s/p CABG in 1993. She has a history of HTN, hyperlipidemia, and carotid bruit. She underwent a cath on 2/2/14 which showed LIMA patent to LAD, occluded Cx with small collaterals. Patient is being seen for a routine office visit. ALT 11 03/29/2019    AST 18 03/29/2019    ALKPHOS 85 03/29/2019    BILITOT <0.2 03/29/2019     Lab Results   Component Value Date    CREATININE 0.5 (L) 03/31/2019    BUN 7 03/31/2019     03/31/2019    K 3.9 03/31/2019     03/31/2019    CO2 23 03/31/2019     Lab Results   Component Value Date    TSH 1.53 03/30/2019     Lab Results   Component Value Date    WBC 7.1 03/31/2019    HGB 12.3 03/31/2019    HCT 36.0 03/31/2019    MCV 88.3 03/31/2019     03/31/2019     No components found for: CHLPL  Lab Results   Component Value Date    TRIG 84 11/02/2011     Lab Results   Component Value Date    HDL 70 11/02/2011     Lab Results   Component Value Date    LDLCALC 50 11/02/2011       Today's EKG: sinus rhythm reviewed by me     Assessment:     . CAD (coronary artery disease) CABG 1993.   2/2/14 Angiogram.  Patent LIMA with occluded Cx with small, hair- like collaterals. Lexiscan 6/2016: Nml perfusion, EF nml. Patient denies any anginal symptoms  On ASA, Lisinopril, Lopressor, and Lovastatin. Plan: continue current medications. 2. HTN (hypertension): today's B/P- 106/62  Stable  Continue current medications: Norvasc, HCTZ, Lisinopril, and Lopressor. 3. Hyperlipidemia: 9/2018:  HDL 74, LDL 51; on lovastatin 20 mg daily   4. Carotid bruit: 2/2013: SIVAKUMAR 40-11%, <17% LICA-- 4/9243: SIVAKUMAR 54-75%, LICA <64%   5. Atrial Flutter- today's EKG sinus rhythm, plan : continue Eliquis     Patient  is stable since last office visit. Plan:   Continue current medications  Follow up in one month with EP to discuss options     I have addressed the patient's cardiac risk factors and adjusted pharmacologic treatment as needed. In addition, I have reinforced the need for patient directed risk factor modification. Further evaluation will be based upon the patient's clinical course and testing results. All questions and concerns were addressed to the patient/family. Alternatives to  treatment were discussed. The patient  currently  is not smoking. The risks related to smoking were reviewed with the patient. Recommend maintaining a smoke-free lifestyle. Products available for smoking cessation were discussed. Thank you for allowing to us to participate in the care of [address] [place] [address]

## 2019-04-04 NOTE — COMMUNICATION BODY
[facility]     Outpatient Follow Up Note    CHIEF COMPLAINT / HPI: Follow Up secondary to coronary artery disease/ HTN/ Hyperlipidemia/ Carotid disease    Brief hospital course: 77-year-old female with history of essential hypertension, hyperlipidemia, CAD, diabetes type 2, osteoarthritis, who presents to the emergency room with complaints of palpitations, noted to be in atrial flutter which rapid ventricular response. She was started on Cardizem infusion and she has since converted to sinus rhythm. She was on metoprolol 25 mg twice a day, which has since been increased to 50 mg twice a day and heart rate remains controlled. Added cardizem 30 mg Q8 this morning. She remains in sinus rhythm. She was initially on subcutaneous Lovenox for anticoagulation, started this admission; this has been changed to eliquis 5 mg twice a day. TSH was checked and was unremarkable. Cardiogram was unremarkable, except for mild tricuspid regurgitation, mild pulmonic regurgitation and aortic valve sclerosis. She has been advised to follow up with cardiology as previously scheduled for 4/4/2019. She has been advised to refrain from NSAID use while on eliquis. She has been counseled about the risk of bleeding associated with eliquis use and she is agreeable to medication use for stroke prevention. She had low mag of 1.5; replaced prior to discharge. She does have chronic hypomagnesemia historically, and she has been prescribed on mag oxide 400 mg daily. Discharged in stable condition. Maricarmen Beckham is 79 y.o. female who presents today for a routine follow up  related to the above mentioned issues. Subjective:   Ms. Lilly Boyle is a 61year old female followed for her history of CAD s/p CABG in 1993. She has a history of HTN, hyperlipidemia, and carotid bruit. She underwent a cath on 2/2/14 which showed LIMA patent to LAD, occluded Cx with small collaterals. Patient is being seen for a routine office visit. Today's EKG sinus rhythm , she is on Eliquis. She denies any chest pain, palpitations, SOB, dizziness, or edema. With regard to medication therapy the patient has been compliant with prescribed regimen. They have tolerated therapy to date. Past Medical History:   Diagnosis Date    Arthritis     CAD (coronary artery disease)     Diabetes mellitus ([facility] [address]     Hyperlipidemia     Hypertension      Social History:    Social History     Tobacco Use   Smoking Status Current Every Day Smoker    Packs/day: 1.00    Years: 30.00    Pack years: 30.00   Smokeless Tobacco Never Used   Tobacco Comment    started smoking again     Current Medications:  Current Outpatient Medications   Medication Sig Dispense Refill    apixaban (ELIQUIS) 5 MG TABS tablet Take 1 tablet by mouth 2 times daily 60 tablet 1    lisinopril (PRINIVIL;ZESTRIL) 20 MG tablet Take 1 tablet by mouth daily 30 tablet 3    metoprolol tartrate (LOPRESSOR) 50 MG tablet Take 1 tablet by mouth 2 times daily 60 tablet 2    diltiazem (CARDIZEM) 30 MG tablet Take 1 tablet by mouth every 8 hours 90 tablet 1    famotidine (PEPCID) 20 MG tablet Take 1 tablet by mouth 2 times daily 60 tablet 3    magnesium oxide (MAG-OX) 400 MG tablet Take 1 tablet by mouth daily 30 tablet 1    ferrous sulfate 325 (65 FE) MG tablet Take 325 mg by mouth daily (with breakfast).  aspirin 81 MG tablet Take 1 tablet by mouth daily. 30 tablet 6    calcium carbonate (OSCAL) 500 MG TABS tablet Take 600 mg by mouth 2 times daily.  lovastatin (MEVACOR) 20 MG tablet Take 20 mg by mouth nightly.  tramadol (ULTRAM) 50 MG tablet Take 50 mg by mouth every 6 hours as needed.  metformin (GLUCOPHAGE) 850 MG tablet Take 850 mg by mouth 2 times daily (with meals).  Multiple Vitamin (MULTIVITAMIN PO) Take 1 tablet by mouth daily.  vitamin B-12 (CYANOCOBALAMIN) 1000 MCG tablet Take 500 mcg by mouth daily.        No current facility-administered medications for this visit. REVIEW OF SYSTEMS:   CONSTITUTIONAL: No major weight gain or loss, fatigue, weakness, night sweats or fever. There's been no change in energy level, sleep pattern, or activity level. HEENT: No new vision difficulties or ringing in the ears. RESPIRATORY: No new SOB, PND, orthopnea or cough. CARDIOVASCULAR: See HPI  GI: No nausea, vomiting, diarrhea, constipation, abdominal pain or changes in bowel habits. : No urinary frequency, urgency, incontinence hematuria or dysuria. SKIN: No cyanosis or skin lesions. MUSCULOSKELETAL: No new muscle or joint pain. NEUROLOGICAL: No syncope or TIA-like symptoms. PSYCHIATRIC: No anxiety, pain, insomnia or depression    Objective:   PHYSICAL EXAM:        VITALS:    Vitals:    04/04/19 1033   BP: 106/62   Pulse: 62   Resp: 16   SpO2: 97%             CONSTITUTIONAL: Cooperative, no apparent distress, and appears well nourished / developed  NEUROLOGIC:  Awake and orientated to person, place and time. PSYCH: Calm affect. SKIN: Warm and dry. HEENT: Sclera non-icteric, normocephalic, neck supple, no elevation of JVP, normal carotid pulses with no bruits and thyroid normal size. LUNGS:  No increased work of breathing and clear to auscultation, no crackles or wheezing. CARDIOVASCULAR:  Regular rate and rhythm with no murmurs, gallops, rubs, or abnormal heart sounds, normal PMI. The apical impulses not displaced. Heart tones are crisp and normal                                                                                          Cervical veins are not engorged                 JVP less than 8 cm H2O                                                                              The carotid upstroke is normal in amplitude and contour without delay. + left carotid bruit   ABDOMEN:  Normal bowel sounds, non-distended and non-tender to palpation   EXT: No edema, no calf tenderness. Pulses are present bilaterally.     DATA:    Lab Results   Component Value Date ALT 11 03/29/2019    AST 18 03/29/2019    ALKPHOS 85 03/29/2019    BILITOT <0.2 03/29/2019     Lab Results   Component Value Date    CREATININE 0.5 (L) 03/31/2019    BUN 7 03/31/2019     03/31/2019    K 3.9 03/31/2019     03/31/2019    CO2 23 03/31/2019     Lab Results   Component Value Date    TSH 1.53 03/30/2019     Lab Results   Component Value Date    WBC 7.1 03/31/2019    HGB 12.3 03/31/2019    HCT 36.0 03/31/2019    MCV 88.3 03/31/2019     03/31/2019     No components found for: CHLPL  Lab Results   Component Value Date    TRIG 84 11/02/2011     Lab Results   Component Value Date    HDL 70 11/02/2011     Lab Results   Component Value Date    LDLCALC 50 11/02/2011       Today's EKG: sinus rhythm reviewed by me     Assessment:     . CAD (coronary artery disease) CABG 1993.   2/2/14 Angiogram.  Patent LIMA with occluded Cx with small, hair- like collaterals. Lexiscan 6/2016: Nml perfusion, EF nml. Patient denies any anginal symptoms  On ASA, Lisinopril, Lopressor, and Lovastatin. Plan: continue current medications. 2. HTN (hypertension): today's B/P- 106/62  Stable  Continue current medications: Norvasc, HCTZ, Lisinopril, and Lopressor. 3. Hyperlipidemia: 9/2018:  HDL 74, LDL 51; on lovastatin 20 mg daily   4. Carotid bruit: 2/2013: SIVAKUMAR 87-64%, <46% LICA-- 7/2095: SIVAKUMAR 60-46%, LICA <83%   5. Atrial Flutter- today's EKG sinus rhythm, plan : continue Eliquis     Patient  is stable since last office visit. Plan:   Continue current medications  Follow up in one month with EP to discuss options     I have addressed the patient's cardiac risk factors and adjusted pharmacologic treatment as needed. In addition, I have reinforced the need for patient directed risk factor modification. Further evaluation will be based upon the patient's clinical course and testing results. All questions and concerns were addressed to the patient/family. Alternatives to  treatment were discussed.

## 2019-04-16 ENCOUNTER — TELEPHONE (OUTPATIENT)
Dept: CARDIOLOGY CLINIC | Age: 69
End: 2019-04-16

## 2019-04-16 NOTE — TELEPHONE ENCOUNTER
Pt calling, per her OV with Sathish Fang on 4/4/19 she wanted pt to make an appt with EP in 1 month to discuss options. Pt needs appt after the week of May 13th. When can we work her in? Pls call to advise. Thank you.

## 2019-04-25 ENCOUNTER — APPOINTMENT (OUTPATIENT)
Dept: GENERAL RADIOLOGY | Age: 69
End: 2019-04-25
Payer: MEDICARE

## 2019-04-25 ENCOUNTER — HOSPITAL ENCOUNTER (OUTPATIENT)
Age: 69
Setting detail: OBSERVATION
Discharge: HOME | End: 2019-04-26
Attending: FAMILY MEDICINE | Admitting: HOSPITALIST
Payer: MEDICARE

## 2019-04-25 DIAGNOSIS — R07.9 CHEST PAIN, UNSPECIFIED TYPE: Primary | ICD-10-CM

## 2019-04-25 DIAGNOSIS — Z72.0 TOBACCO ABUSE: ICD-10-CM

## 2019-04-25 DIAGNOSIS — Z79.01 ADEQUATE ANTICOAGULATION ON ANTICOAGULANT THERAPY: ICD-10-CM

## 2019-04-25 DIAGNOSIS — I25.119 CORONARY ARTERY DISEASE INVOLVING NATIVE HEART WITH ANGINA PECTORIS, UNSPECIFIED VESSEL OR LESION TYPE (HCC): ICD-10-CM

## 2019-04-25 LAB
A/G RATIO: 1.2 (ref 1.1–2.2)
ALBUMIN SERPL-MCNC: 3.6 G/DL (ref 3.4–5)
ALP BLD-CCNC: 82 U/L (ref 40–129)
ALT SERPL-CCNC: 23 U/L (ref 10–40)
ANION GAP SERPL CALCULATED.3IONS-SCNC: 12 MMOL/L (ref 3–16)
AST SERPL-CCNC: 28 U/L (ref 15–37)
BASOPHILS ABSOLUTE: 0 K/UL (ref 0–0.2)
BASOPHILS RELATIVE PERCENT: 0.5 %
BILIRUB SERPL-MCNC: <0.2 MG/DL (ref 0–1)
BUN BLDV-MCNC: 13 MG/DL (ref 7–20)
CALCIUM SERPL-MCNC: 8.9 MG/DL (ref 8.3–10.6)
CHLORIDE BLD-SCNC: 104 MMOL/L (ref 99–110)
CO2: 22 MMOL/L (ref 21–32)
CREAT SERPL-MCNC: 0.6 MG/DL (ref 0.6–1.2)
EOSINOPHILS ABSOLUTE: 0.1 K/UL (ref 0–0.6)
EOSINOPHILS RELATIVE PERCENT: 0.8 %
GFR AFRICAN AMERICAN: >60
GFR NON-AFRICAN AMERICAN: >60
GLOBULIN: 3 G/DL
GLUCOSE BLD-MCNC: 98 MG/DL (ref 70–99)
HCT VFR BLD CALC: 36.3 % (ref 36–48)
HEMOGLOBIN: 12 G/DL (ref 12–16)
INR BLD: 1.1 (ref 0.86–1.14)
LYMPHOCYTES ABSOLUTE: 1.8 K/UL (ref 1–5.1)
LYMPHOCYTES RELATIVE PERCENT: 21.4 %
MCH RBC QN AUTO: 30.4 PG (ref 26–34)
MCHC RBC AUTO-ENTMCNC: 33.2 G/DL (ref 31–36)
MCV RBC AUTO: 91.6 FL (ref 80–100)
MONOCYTES ABSOLUTE: 0.5 K/UL (ref 0–1.3)
MONOCYTES RELATIVE PERCENT: 6.5 %
NEUTROPHILS ABSOLUTE: 6 K/UL (ref 1.7–7.7)
NEUTROPHILS RELATIVE PERCENT: 70.8 %
PDW BLD-RTO: 14.8 % (ref 12.4–15.4)
PLATELET # BLD: 302 K/UL (ref 135–450)
PMV BLD AUTO: 6.9 FL (ref 5–10.5)
POTASSIUM REFLEX MAGNESIUM: 4.3 MMOL/L (ref 3.5–5.1)
PRO-BNP: 827 PG/ML (ref 0–124)
PROTHROMBIN TIME: 12.5 SEC (ref 9.8–13)
RBC # BLD: 3.96 M/UL (ref 4–5.2)
SODIUM BLD-SCNC: 138 MMOL/L (ref 136–145)
TOTAL PROTEIN: 6.6 G/DL (ref 6.4–8.2)
TROPONIN: <0.01 NG/ML
WBC # BLD: 8.4 K/UL (ref 4–11)

## 2019-04-25 PROCEDURE — 71046 X-RAY EXAM CHEST 2 VIEWS: CPT

## 2019-04-25 PROCEDURE — 99285 EMERGENCY DEPT VISIT HI MDM: CPT

## 2019-04-25 PROCEDURE — 6370000000 HC RX 637 (ALT 250 FOR IP): Performed by: PHYSICIAN ASSISTANT

## 2019-04-25 PROCEDURE — 84484 ASSAY OF TROPONIN QUANT: CPT

## 2019-04-25 PROCEDURE — 85610 PROTHROMBIN TIME: CPT

## 2019-04-25 PROCEDURE — 94640 AIRWAY INHALATION TREATMENT: CPT

## 2019-04-25 PROCEDURE — 83880 ASSAY OF NATRIURETIC PEPTIDE: CPT

## 2019-04-25 PROCEDURE — 93005 ELECTROCARDIOGRAM TRACING: CPT | Performed by: FAMILY MEDICINE

## 2019-04-25 PROCEDURE — 80053 COMPREHEN METABOLIC PANEL: CPT

## 2019-04-25 PROCEDURE — 85025 COMPLETE CBC W/AUTO DIFF WBC: CPT

## 2019-04-25 RX ORDER — IPRATROPIUM BROMIDE AND ALBUTEROL SULFATE 2.5; .5 MG/3ML; MG/3ML
1 SOLUTION RESPIRATORY (INHALATION) ONCE
Status: COMPLETED | OUTPATIENT
Start: 2019-04-25 | End: 2019-04-25

## 2019-04-25 RX ADMIN — IPRATROPIUM BROMIDE AND ALBUTEROL SULFATE 1 AMPULE: .5; 3 SOLUTION RESPIRATORY (INHALATION) at 22:47

## 2019-04-25 ASSESSMENT — ENCOUNTER SYMPTOMS
SORE THROAT: 0
VOMITING: 0
NAUSEA: 0
SHORTNESS OF BREATH: 1
DIARRHEA: 0
ABDOMINAL PAIN: 0
BACK PAIN: 0
CHEST TIGHTNESS: 0
COUGH: 1
COLOR CHANGE: 0
WHEEZING: 1

## 2019-04-25 ASSESSMENT — HEART SCORE: ECG: 1

## 2019-04-26 VITALS
RESPIRATION RATE: 16 BRPM | SYSTOLIC BLOOD PRESSURE: 161 MMHG | OXYGEN SATURATION: 96 % | DIASTOLIC BLOOD PRESSURE: 80 MMHG | HEIGHT: 60 IN | WEIGHT: 151.2 LBS | HEART RATE: 60 BPM | TEMPERATURE: 98 F | BODY MASS INDEX: 29.68 KG/M2

## 2019-04-26 LAB
A/G RATIO: 1.8 (ref 1.1–2.2)
ALBUMIN SERPL-MCNC: 3.6 G/DL (ref 3.4–5)
ALP BLD-CCNC: 72 U/L (ref 40–129)
ALT SERPL-CCNC: 20 U/L (ref 10–40)
ANION GAP SERPL CALCULATED.3IONS-SCNC: 10 MMOL/L (ref 3–16)
AST SERPL-CCNC: 19 U/L (ref 15–37)
BILIRUB SERPL-MCNC: <0.2 MG/DL (ref 0–1)
BUN BLDV-MCNC: 12 MG/DL (ref 7–20)
CALCIUM SERPL-MCNC: 8.6 MG/DL (ref 8.3–10.6)
CHLORIDE BLD-SCNC: 104 MMOL/L (ref 99–110)
CO2: 28 MMOL/L (ref 21–32)
CREAT SERPL-MCNC: 0.6 MG/DL (ref 0.6–1.2)
EKG ATRIAL RATE: 80 BPM
EKG DIAGNOSIS: NORMAL
EKG P AXIS: 117 DEGREES
EKG P-R INTERVAL: 138 MS
EKG Q-T INTERVAL: 370 MS
EKG QRS DURATION: 68 MS
EKG QTC CALCULATION (BAZETT): 426 MS
EKG R AXIS: 81 DEGREES
EKG T AXIS: 18 DEGREES
EKG VENTRICULAR RATE: 80 BPM
ESTIMATED AVERAGE GLUCOSE: 122.6 MG/DL
ESTIMATED AVERAGE GLUCOSE: 131.2 MG/DL
GFR AFRICAN AMERICAN: >60
GFR NON-AFRICAN AMERICAN: >60
GLOBULIN: 2 G/DL
GLUCOSE BLD-MCNC: 103 MG/DL (ref 70–99)
GLUCOSE BLD-MCNC: 104 MG/DL (ref 70–99)
GLUCOSE BLD-MCNC: 91 MG/DL (ref 70–99)
GLUCOSE BLD-MCNC: 91 MG/DL (ref 70–99)
GLUCOSE BLD-MCNC: 95 MG/DL (ref 70–99)
GLUCOSE BLD-MCNC: 99 MG/DL (ref 70–99)
HBA1C MFR BLD: 5.9 %
HBA1C MFR BLD: 6.2 %
HCT VFR BLD CALC: 34.1 % (ref 36–48)
HEMOGLOBIN: 11.4 G/DL (ref 12–16)
LV EF: 72 %
LVEF MODALITY: NORMAL
MCH RBC QN AUTO: 30.3 PG (ref 26–34)
MCHC RBC AUTO-ENTMCNC: 33.3 G/DL (ref 31–36)
MCV RBC AUTO: 90.9 FL (ref 80–100)
PDW BLD-RTO: 14.7 % (ref 12.4–15.4)
PERFORMED ON: ABNORMAL
PERFORMED ON: ABNORMAL
PERFORMED ON: NORMAL
PLATELET # BLD: 304 K/UL (ref 135–450)
PMV BLD AUTO: 6.6 FL (ref 5–10.5)
POTASSIUM REFLEX MAGNESIUM: 4.2 MMOL/L (ref 3.5–5.1)
RBC # BLD: 3.75 M/UL (ref 4–5.2)
SODIUM BLD-SCNC: 142 MMOL/L (ref 136–145)
TOTAL PROTEIN: 5.6 G/DL (ref 6.4–8.2)
TROPONIN: 0.02 NG/ML
TROPONIN: <0.01 NG/ML
WBC # BLD: 8.1 K/UL (ref 4–11)

## 2019-04-26 PROCEDURE — 78452 HT MUSCLE IMAGE SPECT MULT: CPT

## 2019-04-26 PROCEDURE — 6360000002 HC RX W HCPCS: Performed by: INTERNAL MEDICINE

## 2019-04-26 PROCEDURE — 3430000000 HC RX DIAGNOSTIC RADIOPHARMACEUTICAL: Performed by: HOSPITALIST

## 2019-04-26 PROCEDURE — 94760 N-INVAS EAR/PLS OXIMETRY 1: CPT

## 2019-04-26 PROCEDURE — G0378 HOSPITAL OBSERVATION PER HR: HCPCS

## 2019-04-26 PROCEDURE — A9502 TC99M TETROFOSMIN: HCPCS | Performed by: HOSPITALIST

## 2019-04-26 PROCEDURE — 36415 COLL VENOUS BLD VENIPUNCTURE: CPT

## 2019-04-26 PROCEDURE — 80053 COMPREHEN METABOLIC PANEL: CPT

## 2019-04-26 PROCEDURE — 84484 ASSAY OF TROPONIN QUANT: CPT

## 2019-04-26 PROCEDURE — 2580000003 HC RX 258: Performed by: HOSPITALIST

## 2019-04-26 PROCEDURE — 83036 HEMOGLOBIN GLYCOSYLATED A1C: CPT

## 2019-04-26 PROCEDURE — 93017 CV STRESS TEST TRACING ONLY: CPT | Performed by: INTERNAL MEDICINE

## 2019-04-26 PROCEDURE — 85027 COMPLETE CBC AUTOMATED: CPT

## 2019-04-26 PROCEDURE — 6370000000 HC RX 637 (ALT 250 FOR IP): Performed by: HOSPITALIST

## 2019-04-26 PROCEDURE — 93010 ELECTROCARDIOGRAM REPORT: CPT | Performed by: INTERNAL MEDICINE

## 2019-04-26 RX ORDER — METOPROLOL TARTRATE 50 MG/1
50 TABLET, FILM COATED ORAL 2 TIMES DAILY
Status: DISCONTINUED | OUTPATIENT
Start: 2019-04-26 | End: 2019-04-26 | Stop reason: HOSPADM

## 2019-04-26 RX ORDER — NICOTINE POLACRILEX 4 MG
15 LOZENGE BUCCAL PRN
Status: DISCONTINUED | OUTPATIENT
Start: 2019-04-26 | End: 2019-04-26 | Stop reason: HOSPADM

## 2019-04-26 RX ORDER — DEXTROSE MONOHYDRATE 50 MG/ML
100 INJECTION, SOLUTION INTRAVENOUS PRN
Status: DISCONTINUED | OUTPATIENT
Start: 2019-04-26 | End: 2019-04-26 | Stop reason: HOSPADM

## 2019-04-26 RX ORDER — AMINOPHYLLINE DIHYDRATE 25 MG/ML
100 INJECTION, SOLUTION INTRAVENOUS
Status: DISCONTINUED | OUTPATIENT
Start: 2019-04-26 | End: 2019-04-26 | Stop reason: HOSPADM

## 2019-04-26 RX ORDER — LISINOPRIL 20 MG/1
20 TABLET ORAL DAILY
Status: DISCONTINUED | OUTPATIENT
Start: 2019-04-26 | End: 2019-04-26 | Stop reason: HOSPADM

## 2019-04-26 RX ORDER — DEXTROSE MONOHYDRATE 25 G/50ML
12.5 INJECTION, SOLUTION INTRAVENOUS PRN
Status: DISCONTINUED | OUTPATIENT
Start: 2019-04-26 | End: 2019-04-26 | Stop reason: HOSPADM

## 2019-04-26 RX ORDER — SIMVASTATIN 20 MG
10 TABLET ORAL NIGHTLY
Status: DISCONTINUED | OUTPATIENT
Start: 2019-04-26 | End: 2019-04-26 | Stop reason: CLARIF

## 2019-04-26 RX ORDER — ROSUVASTATIN CALCIUM 10 MG/1
5 TABLET, COATED ORAL NIGHTLY
Status: DISCONTINUED | OUTPATIENT
Start: 2019-04-26 | End: 2019-04-26 | Stop reason: HOSPADM

## 2019-04-26 RX ORDER — SODIUM CHLORIDE 0.9 % (FLUSH) 0.9 %
10 SYRINGE (ML) INJECTION PRN
Status: DISCONTINUED | OUTPATIENT
Start: 2019-04-26 | End: 2019-04-26 | Stop reason: HOSPADM

## 2019-04-26 RX ORDER — ONDANSETRON 2 MG/ML
4 INJECTION INTRAMUSCULAR; INTRAVENOUS EVERY 6 HOURS PRN
Status: DISCONTINUED | OUTPATIENT
Start: 2019-04-26 | End: 2019-04-26 | Stop reason: HOSPADM

## 2019-04-26 RX ORDER — SODIUM CHLORIDE 0.9 % (FLUSH) 0.9 %
10 SYRINGE (ML) INJECTION EVERY 12 HOURS SCHEDULED
Status: DISCONTINUED | OUTPATIENT
Start: 2019-04-26 | End: 2019-04-26 | Stop reason: HOSPADM

## 2019-04-26 RX ORDER — NITROGLYCERIN 0.4 MG/1
0.4 TABLET SUBLINGUAL EVERY 5 MIN PRN
Status: DISCONTINUED | OUTPATIENT
Start: 2019-04-26 | End: 2019-04-26 | Stop reason: HOSPADM

## 2019-04-26 RX ORDER — ASPIRIN 81 MG/1
81 TABLET, CHEWABLE ORAL DAILY
Status: DISCONTINUED | OUTPATIENT
Start: 2019-04-26 | End: 2019-04-26 | Stop reason: HOSPADM

## 2019-04-26 RX ORDER — POTASSIUM CHLORIDE 7.45 MG/ML
10 INJECTION INTRAVENOUS PRN
Status: DISCONTINUED | OUTPATIENT
Start: 2019-04-26 | End: 2019-04-26 | Stop reason: HOSPADM

## 2019-04-26 RX ORDER — FERROUS SULFATE 325(65) MG
325 TABLET ORAL
Status: DISCONTINUED | OUTPATIENT
Start: 2019-04-26 | End: 2019-04-26 | Stop reason: HOSPADM

## 2019-04-26 RX ORDER — POTASSIUM CHLORIDE 20 MEQ/1
40 TABLET, EXTENDED RELEASE ORAL PRN
Status: DISCONTINUED | OUTPATIENT
Start: 2019-04-26 | End: 2019-04-26 | Stop reason: HOSPADM

## 2019-04-26 RX ORDER — MAGNESIUM SULFATE 1 G/100ML
1 INJECTION INTRAVENOUS PRN
Status: DISCONTINUED | OUTPATIENT
Start: 2019-04-26 | End: 2019-04-26 | Stop reason: HOSPADM

## 2019-04-26 RX ADMIN — TETROFOSMIN 10 MILLICURIE: 0.23 INJECTION, POWDER, LYOPHILIZED, FOR SOLUTION INTRAVENOUS at 10:24

## 2019-04-26 RX ADMIN — FERROUS SULFATE TAB 325 MG (65 MG ELEMENTAL FE) 325 MG: 325 (65 FE) TAB at 09:41

## 2019-04-26 RX ADMIN — REGADENOSON 0.4 MG: 0.08 INJECTION, SOLUTION INTRAVENOUS at 12:02

## 2019-04-26 RX ADMIN — LISINOPRIL 20 MG: 20 TABLET ORAL at 09:41

## 2019-04-26 RX ADMIN — DILTIAZEM HYDROCHLORIDE 30 MG: 30 TABLET, FILM COATED ORAL at 14:23

## 2019-04-26 RX ADMIN — DILTIAZEM HYDROCHLORIDE 30 MG: 30 TABLET, FILM COATED ORAL at 06:37

## 2019-04-26 RX ADMIN — METOPROLOL TARTRATE 50 MG: 50 TABLET, FILM COATED ORAL at 09:41

## 2019-04-26 RX ADMIN — ASPIRIN 81 MG 81 MG: 81 TABLET ORAL at 09:41

## 2019-04-26 RX ADMIN — Medication 10 ML: at 09:41

## 2019-04-26 RX ADMIN — TETROFOSMIN 30 MILLICURIE: 0.23 INJECTION, POWDER, LYOPHILIZED, FOR SOLUTION INTRAVENOUS at 12:06

## 2019-04-26 SDOH — HEALTH STABILITY: MENTAL HEALTH: HOW OFTEN DO YOU HAVE A DRINK CONTAINING ALCOHOL?: NEVER

## 2019-04-26 ASSESSMENT — PAIN SCALES - GENERAL
PAINLEVEL_OUTOF10: 0

## 2019-04-26 NOTE — ED NOTES
Pt repot given to JESÚS Chapman, states no questions or concerns at this time. Pt transported to floor via wheelchair by ED tech with portable telemetry on throughout transport.  Telemetry confirmed NS @ 74 by Abdiel Cash RN  04/26/19 [address] Chi Crane RN  04/26/19 [id number]

## 2019-04-26 NOTE — H&P
_    [facility]    4/26/2019 12:20 AM        Patient Information:  Steve Rosa is a 79 y.o. female [id number]    PCP:  George Trevino (Tel: [phone] )        Date of Service:   Pt seen/examined on 4/25/19   Placed in Observation. Chief complaint:    Chief Complaint   Patient presents with    Chest Pain     Pt was sitting and had sudden chest pain come on, had gone away by the time EMS arrived. Pt with hx of stent and recent diagnosis of afib 2 weeks ago. History of Present Illness:  Ravinder Diana is a 79 y.o. female who presented with   · Chest pain s/s . Started around 9 PM while at rest . She did help with painting a friends place earlier in the day though . Called 911 . Took 4 baby ASA @ Home . Chest pain resolved by ED arrival . No NTG taken @ home . · Still smokes @ home   · Known CAD/CABG in past       History obtained from   · Patient    · Prior chart  As well       So far, ED Findings/Workup/Labs shows :   · BP. HTN    · HR. Stable     · Saturations. Stable    · Temperature. Afebrile       · Imaging done in ED as below. And is/are s/o No acute or concerning findings/pathology noted on review   · Pertinent Abnormal Labs and assessment as below. While in ED, patient care and medications given :   · Imaging  CXR , done in ED   · Nebs       Currently, on my evaluation, patient is :   · Since arrival, post above Rx, patient reports no chest pain currently   ·           REVIEW OF SYSTEMS:     Constitutional:  Negative for fever, chills or night sweats  ENT:   Negative for rhinorrhea, epistaxis, hoarseness, sore throat.   Respiratory:   Negative for shortness of breath, wheezing  Cardiovascular: as above s   Gastrointestinal:   Negative for nausea, vomiting, diarrhea  Genitourinary:   Negative for polyuria, dysuria   Hematologic/Lymphatic:   Negative for  bleeding tendency, easy bruising  Musculoskeletal:   Negative for myalgias and arthralgias  Neurologic:   Negative for  Confusion, dysarthria. Skin:   Negative for itching,rash  Psychiatric:  Negative for depression, anxiety, agitation. Endocrine:  Negative for polydipsia, polyuria,heat /cold intolerance. Past Medical History:         has a past medical history of Arthritis, CAD (coronary artery disease), Diabetes mellitus ([facility] [address] Hyperlipidemia, and Hypertension. Past Surgical History:         has a past surgical history that includes Coronary artery bypass graft (1993); Gastric bypass surgery; Cardiac surgery (2001); Dilatation, esophagus; and Cholecystectomy. Medications:          Current Outpatient Medications on File Prior to Encounter   Medication Sig Dispense Refill    apixaban (ELIQUIS) 5 MG TABS tablet Take 1 tablet by mouth 2 times daily 60 tablet 1    lisinopril (PRINIVIL;ZESTRIL) 20 MG tablet Take 1 tablet by mouth daily 30 tablet 3    metoprolol tartrate (LOPRESSOR) 50 MG tablet Take 1 tablet by mouth 2 times daily 60 tablet 2    diltiazem (CARDIZEM) 30 MG tablet Take 1 tablet by mouth every 8 hours 90 tablet 1    famotidine (PEPCID) 20 MG tablet Take 1 tablet by mouth 2 times daily 60 tablet 3    magnesium oxide (MAG-OX) 400 MG tablet Take 1 tablet by mouth daily 30 tablet 1    ferrous sulfate 325 (65 FE) MG tablet Take 325 mg by mouth daily (with breakfast).  aspirin 81 MG tablet Take 1 tablet by mouth daily. 30 tablet 6    calcium carbonate (OSCAL) 500 MG TABS tablet Take 600 mg by mouth 2 times daily.  lovastatin (MEVACOR) 20 MG tablet Take 20 mg by mouth nightly.  tramadol (ULTRAM) 50 MG tablet Take 50 mg by mouth every 6 hours as needed.  metformin (GLUCOPHAGE) 850 MG tablet Take 850 mg by mouth 2 times daily (with meals).  Multiple Vitamin (MULTIVITAMIN PO) Take 1 tablet by mouth daily.  vitamin B-12 (CYANOCOBALAMIN) 1000 MCG tablet Take 500 mcg by mouth daily. Allergies:   Allergies   Allergen Reactions  Codeine Rash    Demerol Rash          Social History:   reports that she has been smoking cigarettes. She has a 30.00 pack-year smoking history. She has never used smokeless tobacco. She reports that she does not drink alcohol or use drugs. Family History:            Problem Relation Age of Onset    Cancer Mother     Diabetes Father     Heart Disease Father     Cancer Father     Cancer Maternal Aunt            Physical Exam:  BP (!) 156/52   Pulse 78   Temp 97.9 °F (36.6 °C) (Oral)   Resp 13   Ht 5' (1.524 m)   Wt 153 lb (69.4 kg)   SpO2 96%   BMI 29.88 kg/m²     General appearance: Appears  comfortable. Well nourished   Eyes:  Sclera clear, pupils equal  ENT:  Moist mucus membranes, Trachea midline. Cardiovascular: Regular rhythm, normal S1, S2. No murmur, gallop, rub. No edema in lower extremities   Respiratory:   Noted Clear to auscultation bilaterally,  No wheeze, good inspiratory effort   Gastrointestinal:  Abdomen soft,  non-tender,  not distended,  normal bowel sounds   Musculoskeletal:  No cyanosis in digits, neck supple  Neurology:  Cranial nerves grossly intact. Alert and oriented in time, place and person. No speech or motor deficits   Psychiatry:  Appropriate affect.  Not agitated  Skin:  Warm, dry, normal turgor, no rash       Labs:     Recent Labs     04/25/19 2235   WBC 8.4   HGB 12.0   HCT 36.3        Recent Labs     04/25/19 2236      K 4.3      CO2 22   BUN 13   CREATININE 0.6   CALCIUM 8.9     Recent Labs     04/25/19 2236   AST 28   ALT 23   BILITOT <0.2   ALKPHOS 82     Recent Labs     04/25/19 2235   INR 1.10     Recent Labs     04/25/19 2236   TROPONINI <0.01         Urinalysis:    No results found for: Simran Civil, BACTERIA, RBCUA, BLOODU, [address], [address]      Radiology:     CXR:   I have reviewed the CXR  No acute or concerning findings/pathology noted on review    EKG/Telemonitor :    I have reviewed the EKG  NSR     IMAGING :     XR CHEST STANDARD (2 VW)   Final Result   No acute abnormality               ASSESSMENT / PLAN     Active Hospital Problems    Diagnosis Date Noted    Atrial flutter with rapid ventricular response ([facility] [address] [I48.92] 03/29/2019    Diabetes mellitus type 2, controlled ([facility] [address] [E11.9] 03/29/2019    Chest pain [R07.9] 06/06/2016    CAD (coronary artery disease) [I25.10] 11/04/2011    Essential hypertension [I10] 11/04/2011    Hyperlipidemia [E78.5] 11/04/2011    Carotid bruit [R09.89] 11/04/2011         · Atypical chest pain , r/o ACS : Will continue cardiac monitor while inpatient. Admission EKG reviewed. EKG prn chest pain. Cardiac enzymes on admission in ED is negative. Will trend cardiac enzymes further while inpatient. Fasting lipid panel in AM. => Medication Plan : c/w Home ASA + BB => Planned NM stress test in AM.   · Recently detected PAF : now in SR : Resumed home medications of CCB + BB + Eliquis   · Smoker + Chronic smoker : smoking cessation counseling done. · Essential Hypertension : Resumed home medications of BB   · Mixed Hyperlipidemia : Resumed home medications of statin  · CAD / sp CABG in 1993 , s/p PCI in past  : per chart review \" 2/2/14 Angiogram.  Patent LIMA with occluded Cx with small, hair- like collaterals. + Lexiscan 6/2016: Nml perfusion, EF nml \"       · Home medications for Chronic medical problems reviewed. · Home medications Held/Resumed, and Pertinent changes made in home medications on admission, as needed, according to current medical status, as mentioned above.  Please see EPIC orders for detailed recommendations of plan and medications       · DVT Prophylaxis : Eliquis  S/q + SCDs   · GI Prophylaxis :  None  as per orders   · Diet : NPO PM       · Code status : full   · PT/OT and ambulatory Eval Status: Ambulate with Assist    · Probable LOS & future Disposition planned post discharge  - home in 1-2 days       Medical Decision Making : high     Total patient Care time spent in evaluating the patient an discussing plan with appropriate staff/patient/family members is 47 min       Byron Clay MD    Hospitalist, [facility].    4/26/2019 12:20 AM

## 2019-04-26 NOTE — ED PROVIDER NOTES
[address] [facility]  eMERGENCY dEPARTMENT eNCOUnter        Pt Name: Timo Guerrero  MRN: [id number]  Jusgfvinicius 1950  Date of evaluation: 4/25/2019  Provider: Mykel Matos PA-C  PCP: Osiris Camejo    This patient was seen and evaluated by the attending physician Dr. Pat Primrose. CHIEF COMPLAINT       Chief Complaint   Patient presents with    Chest Pain     Pt was sitting and had sudden chest pain come on, had gone away by the time EMS arrived. Pt with hx of stent and recent diagnosis of afib 2 weeks ago. HISTORY OF PRESENT ILLNESS   (Location/Symptom, Timing/Onset, Context/Setting, Quality, Duration, Modifying Factors, Severity)  Note limiting factors. Timo Guerrero is a 79 y.o. female presents the emergency department reports of difficulty as a pertains to chest pain. Patient states that she was in her home environment watching television just prior to 9:00 when she had an onset of pain and discomfort in the middle portion of her chest.  She states that's where it started but it spread out to both the left and right side in the anterior chest into her left shoulder. She describes it as being a heaviness and a pressure. She states that she had associated symptoms of this that occurred for approximately 15 or 20 minutes before she contacted the squad. She states shortly after she called the pain and discomfort did seem to savage. She states that she was instructed to take 4 baby aspirin which she did prior to the squad arrival.  Patient states that she does have an extensive cardiac history dating back to a CABG single vessel in 1993 and she had cardiac stenting back in 2001. She's an active care patient of Dr. Willy Brooks who is currently anticoagulated with Eliquis because of a recent difficulty with paroxysmal atrial fibrillation within the last 2 months.   Patient states that despite this extensive cardiac history she had never had chest pain until today. Patient goes on to report she does have a history of continued tobacco abuse. A shin states that she does have intermittent symptoms of shortness of breath but does not have a diagnosis of COPD and/or emphysema. Patient reports that she does not use inhalers in the home environment. Patient states that she is not currently experiencing abdominal pain. She denies nausea vomiting or diarrhea. She denies fevers and or chills. She denies diaphoresis. He denies hematuria and/or flank pain. She denies dysuria, urgency, frequency. Patient states that she has no other associated complaints voiced at the present time. Nursing Notes were all reviewed and agreed with or any disagreements were addressed  in the HPI. REVIEW OF SYSTEMS    (2-9 systems for level 4, 10 or more for level 5)     Review of Systems   Constitutional: Negative for activity change, chills and fever. HENT: Negative for ear pain and sore throat. Eyes: Negative for visual disturbance. Respiratory: Positive for cough, shortness of breath and wheezing. Negative for chest tightness. Cardiovascular: Positive for chest pain. Negative for palpitations and leg swelling. Gastrointestinal: Negative for abdominal pain, diarrhea, nausea and vomiting. Genitourinary: Negative for dysuria, flank pain, frequency and hematuria. Musculoskeletal: Negative for back pain and myalgias. Skin: Negative for color change and wound. Neurological: Negative for seizures, syncope and headaches. Hematological: Bruises/bleeds easily (on eloquis). Positives and Pertinent negatives as per HPI. Except as noted abovein the ROS, all other systems were reviewed and negative.        PAST MEDICAL HISTORY     Past Medical History:   Diagnosis Date    Arthritis     CAD (coronary artery disease)     Diabetes mellitus ([facility] [address]     Hyperlipidemia     Hypertension        SURGICAL HISTORY     Past Surgical History:   Procedure Laterality Date    CARDIAC SURGERY  2001    stent    [facility]    DILATATION, ESOPHAGUS      gastric bypass    GASTRIC BYPASS SURGERY           CURRENTMEDICATIONS       Previous Medications    APIXABAN (ELIQUIS) 5 MG TABS TABLET    Take 1 tablet by mouth 2 times daily    ASPIRIN 81 MG TABLET    Take 1 tablet by mouth daily. CALCIUM CARBONATE (OSCAL) 500 MG TABS TABLET    Take 600 mg by mouth 2 times daily. DILTIAZEM (CARDIZEM) 30 MG TABLET    Take 1 tablet by mouth every 8 hours    FAMOTIDINE (PEPCID) 20 MG TABLET    Take 1 tablet by mouth 2 times daily    FERROUS SULFATE 325 (65 FE) MG TABLET    Take 325 mg by mouth daily (with breakfast). LISINOPRIL (PRINIVIL;ZESTRIL) 20 MG TABLET    Take 1 tablet by mouth daily    LOVASTATIN (MEVACOR) 20 MG TABLET    Take 20 mg by mouth nightly. MAGNESIUM OXIDE (MAG-OX) 400 MG TABLET    Take 1 tablet by mouth daily    METFORMIN (GLUCOPHAGE) 850 MG TABLET    Take 850 mg by mouth 2 times daily (with meals). METOPROLOL TARTRATE (LOPRESSOR) 50 MG TABLET    Take 1 tablet by mouth 2 times daily    MULTIPLE VITAMIN (MULTIVITAMIN PO)    Take 1 tablet by mouth daily. TRAMADOL (ULTRAM) 50 MG TABLET    Take 50 mg by mouth every 6 hours as needed. VITAMIN B-12 (CYANOCOBALAMIN) 1000 MCG TABLET    Take 500 mcg by mouth daily.          ALLERGIES     Codeine and Demerol    FAMILYHISTORY       Family History   Problem Relation Age of Onset    Cancer Mother     Diabetes Father     Heart Disease Father     Cancer Father     Cancer Maternal Aunt           SOCIAL HISTORY       Social History     Socioeconomic History    Marital status:      Spouse name: None    Number of children: None    Years of education: None    Highest education level: None   Occupational History    None   Social Needs    Financial resource strain: None    Food insecurity:     Worry: None     Inability: None    Transportation needs: Medical: None     Non-medical: None   Tobacco Use    Smoking status: Current Every Day Smoker     Packs/day: 1.00     Years: 30.00     Pack years: 30.00     Types: Cigarettes    Smokeless tobacco: Never Used    Tobacco comment: started smoking again   Substance and Sexual Activity    Alcohol use: No     Alcohol/week: 0.0 oz    Drug use: No    Sexual activity: None   Lifestyle    Physical activity:     Days per week: None     Minutes per session: None    Stress: None   Relationships    Social connections:     Talks on phone: None     Gets together: None     Attends Yazidi service: None     Active member of club or organization: None     Attends meetings of clubs or organizations: None     Relationship status: None    Intimate partner violence:     Fear of current or ex partner: None     Emotionally abused: None     Physically abused: None     Forced sexual activity: None   Other Topics Concern    None   Social History Narrative    None       SCREENINGS      Heart Score for chest pain patients  History: Moderately Suspicious  ECG: Non-Specifc repolarization disturbance/LBTB/PM  Patient Age: > 65 years  *Risk factors for Atherosclerotic disease: Cigarette smoking, Diabetes Mellitus, Hypercholesterolemia, Hypertension, Coronary Artery Disease  Risk Factors: > 3 Risk factors or history of atherosclerotic disease*  Troponin: < 1X normal limit  Heart Score Total: 6      PHYSICAL EXAM    (up to 7 for level 4, 8 or more for level 5)     ED Triage Vitals [04/25/19 2208]   BP Temp Temp Source Pulse Resp SpO2 Height Weight   (!) 178/72 97.9 °F (36.6 °C) Oral 77 18 96 % 5' (1.524 m) 153 lb (69.4 kg)       Physical Exam   Constitutional: She is oriented to person, place, and time. She appears well-developed and well-nourished. She is active and cooperative. No distress. HENT:   Head: Normocephalic and atraumatic.    Right Ear: Hearing and external ear normal.   Left Ear: Hearing and external ear normal.   Eyes: Conjunctivae are normal. Right eye exhibits no discharge. Left eye exhibits no discharge. No scleral icterus. Neck: Normal range of motion. No JVD present. Cardiovascular: Normal rate and regular rhythm. Exam reveals no gallop and no friction rub. No murmur heard. Pulmonary/Chest: Effort normal. No accessory muscle usage. No respiratory distress. She has wheezes. She has no rhonchi. She has no rales. Neurological: She is alert and oriented to person, place, and time. She has normal strength. No cranial nerve deficit or sensory deficit. Coordination normal. GCS eye subscore is 4. GCS verbal subscore is 5. GCS motor subscore is 6. Skin: Skin is warm and dry. She is not diaphoretic. Psychiatric: She has a normal mood and affect. Her behavior is normal.   Nursing note and vitals reviewed.       DIAGNOSTIC RESULTS   LABS:    Labs Reviewed   CBC WITH AUTO DIFFERENTIAL - Abnormal; Notable for the following components:       Result Value    RBC 3.96 (*)     All other components within normal limits    Narrative:     Performed at:  [facility] [address], [place], [address]   Phone [phone]   BRAIN NATRIURETIC PEPTIDE - Abnormal; Notable for the following components:    Pro- (*)     All other components within normal limits    Narrative:     Performed at:  [facility] [address], [place], [address]   Phone [phone]   COMPREHENSIVE METABOLIC PANEL W/ REFLEX TO MG FOR LOW K    Narrative:     Performed at:  [facility] [address], [place], [place]   Phone [phone]   TROPONIN    Narrative:     Performed at:  [facility] [address], [place], [address]   Phone [phone]   PROTIME-INR    Narrative:     Performed at:  [facility] [address], [place]   Phone [phone]       All other labs were within normal range or not returned as of this dictation. EKG: All EKG's are interpreted by the Emergency Department Physician who either signs orCo-signs this chart in the absence of a cardiologist.  Please see their note for interpretation of EKG. RADIOLOGY:   Non-plain film images such as CT, Ultrasound and MRI are read by the radiologist. Plain radiographic images are visualized andpreliminarily interpreted by the  ED Provider with the below findings:        Interpretation perthe Radiologist below, if available at the time of this note:    XR CHEST STANDARD (2 VW)   Final Result   No acute abnormality           No results found. PROCEDURES   Unless otherwise noted below, none     Procedures    CRITICAL CARE TIME   N/A    CONSULTS:  IP CONSULT TO HOSPITALIST      EMERGENCY DEPARTMENT COURSE and DIFFERENTIALDIAGNOSIS/MDM:   Vitals:    Vitals:    04/25/19 2208 04/25/19 2234 04/25/19 2248   BP: (!) 178/72 (!) 153/55    Pulse: 77 77    Resp: 18 16 17   Temp: 97.9 °F (36.6 °C)     TempSrc: Oral     SpO2: 96% 95% 98%   Weight: 153 lb (69.4 kg)     Height: 5' (1.524 m)         Patient was given thefollowing medications:  Medications   ipratropium-albuterol (DUONEB) nebulizer solution 1 ampule (1 ampule Inhalation Given 4/25/19 2247)       The patient's detailed history of present illness is documented as above. Upon arrival to the emergency department the patient's vital signs are as documented. The patient is noted to be hemodynamically stable and afebrile. Physical examination findings are as above. IV access was obtained. Laboratory testing a workup was initiated. Initial EKG demonstrates a sinus rhythm with a rate 80. Mild baseline wander noted. No evidence of acute ST elevation. Please see attending physician details for further EKG interpretation.  The patient's CBC is as documented above demonstrating no evidence of significant leukocytosis, anemia, thrombocytopenia and/or thrombocytosis. Comprehensive metabolic panel demonstrates a BUN of 13 and creatinine 0.6 left lites and LFTs are unremarkable. Troponin is less than 0.01. ProBNP is a 27. Relation profiles unremarkable and a patient on Eliquis. Chest x-ray demonstrates no evidence of acute cardiopulmonary process. Patient's wheezing is better. Unfortunately she has never had chest pain despite having had previous CABG as well as stenting back in 2001. She did have a Doppler examination performed in her recent hospitalization back in the end of March but has not had a stress test completed since 2016. In light of the onset of the chest pain as well as her extensive cardiac history I do believe it prudent to have the patient come in for ongoing care management of her chest pain she was expressing this evening. Case was discussed directly with the hospitalist. Dr. Rangel Shoe the patient for admission for ongoing care and management of her chest pain. FINAL IMPRESSION      1. Chest pain, unspecified type    2. Coronary artery disease involving native heart with angina pectoris, unspecified vessel or lesion type ([address]    3. Tobacco abuse    4.  Adequate anticoagulation on anticoagulant therapy          DISPOSITION/PLAN   DISPOSITION: Admit medical stable condition        DISCONTINUED MEDICATIONS:  Discontinued Medications    No medications on file            (Please note that portions ofthis note were completed with a voice recognition program.  Efforts were made to edit the dictations but occasionally words are mis-transcribed.)    Fede Costa PA-C (electronically signed)           Narayan Galan PA-C  04/26/19 0011

## 2019-04-26 NOTE — PLAN OF CARE
Problem: Pain:  Goal: Pain level will decrease  Description  Pain level will decrease  Outcome: Ongoing     Problem: Pain:  Goal: Control of acute pain  Description  Control of acute pain  4/26/2019 1139 by Lucretia Johansen RN  Outcome: Ongoing  4/26/2019 0339 by Faviola Trivedi RN  Outcome: Ongoing

## 2019-04-26 NOTE — PROGRESS NOTES
Imp- CAD with single vessel CAD-s/p bypass with LIMA to LAD 1993 after restenosis after balloon angioplasty- prior to coronary stent era    S/p gastric bypass with excellent weight loss  Cigarette use  Paroxysmal a. Flutter-now sinus on eliquis  Atypical chest pain with no findings to support acute coronary syndrome    Rec- lexiscan with myoview.  If normal then discharge to outpt f/up  If abnormal then will need cardiac cath- best to do after off eliquis for 24-48 hours so unlikely to have cath today unless becomes an emergency

## 2019-04-26 NOTE — ED PROVIDER NOTES
I independently examined and evaluated Sherin Winchester. In brief, 16-year-old female with increasing episodes of chest heaviness that goes across her chest and both shoulders and described as a pressure. She did take 4 baby aspirin prior to arrival.  Patient is status post CABG and stenting but has had no cardiac stress test or repeat evaluation the last 2 years that I can find. No orthopnea. No lower extremity edema. On arrival she is feeling better. Focused exam revealed vital signs normal.  Mentating normally. Pleasant. Lungs clear to auscultation with no rales or wheeze. Heart regular rate and rhythm with no murmur. No lotion edema or calf pain    ED course: EKG demonstrates normal sinus rhythm with a rate of 80. Normal axis. Poor R wave progression. . QRS 68. . No Q wave. No ST elevation or depression. Abnormal EKG but no evidence of acute ischemia. Initial troponin negative. Remainder of labs reassuring. Chest x-ray negative. Patient will require admission for continued cardiac trending and definitive testing. Critical care time of 20 minutes with cardiac, pulmonary systems acute risk of decompensation or collapse requiring frequent bedside repeat evaluation. This is exclusionary of any billable procedures    All diagnostic, treatment, and disposition decisions were made by myself in conjunction with the advanced practice provider. For all further details of the patient's emergency department visit, please see the advanced practice provider's documentation. Comment: Please note this report has been produced using speech recognition software and may contain errors related to that system including errors in grammar, punctuation, and spelling, as well as words and phrases that may be inappropriate. If there are any questions or concerns please feel free to contact the dictating provider for clarification.       Dejan Shepard MD  04/26/19 0724

## 2019-04-26 NOTE — ED NOTES
Bed: 28  Expected date:   Expected time:   Means of arrival: [facility]  Comments:  Lori Dsouza RN  04/25/19 1673

## 2019-04-26 NOTE — DISCHARGE SUMMARY
Physician Discharge Summary     Patient ID:  Sherin Winchester  [id number]  67 y.o.  1950    Admit date: 4/25/2019    Discharge date and time: No discharge date for patient encounter. Admitting Physician: Francisco Gonzalez MD     Discharge Physician: Kari Cohn MD    Admission Diagnoses: Chest pain [R07.9]  Chest pain [R07.9]    Discharge Diagnoses: Atypical chest pain    Admission Condition: fair    Discharged Condition: good    Indication for Admission: R/o ACS    Hospital Course:   79year old female presented with chest pain. Called 911. Took 4 baby ASA at home. Chest pain resolved by ED arrival . Known history of CAD with single vessel CAD-s/p bypass with LIMA to LAD 1993 after restenosis after balloon angioplasty. Seen by cardiology. Lexiscan with myoview came back unremarkable. Ok to d/c home per cardiology. Pt was discharged home in stable condition.      Consults: cardiology    Significant Diagnostic Studies: Stress test     Discharge Exam:  BP (!) 161/80   Pulse 60   Temp 98 °F (36.7 °C) (Temporal)   Resp 16   Ht 5' (1.524 m)   Wt 151 lb 3.2 oz (68.6 kg)   SpO2 96%   BMI 29.53 kg/m²     General Appearance:    Alert, cooperative, no distress, appears stated age   Head:    Normocephalic, without obvious abnormality, atraumatic   Eyes:    PERRL, conjunctiva/corneas clear, EOM's intact, fundi     benign, both eyes   Ears:    Normal TM's and external ear canals, both ears   Nose:   Nares normal, septum midline, mucosa normal, no drainage    or sinus tenderness   Throat:   Lips, mucosa, and tongue normal; teeth and gums normal   Neck:   Supple, symmetrical, trachea midline, no adenopathy;     thyroid:  no enlargement/tenderness/nodules; no carotid    bruit or JVD   Back:     Symmetric, no curvature, ROM normal, no CVA tenderness   Lungs:     Clear to auscultation bilaterally, respirations unlabored   Chest Wall:    No tenderness or deformity    Heart:    Regular rate and rhythm, S1 and S2 normal, tablet Take 50 mg by mouth every 6 hours as needed. metformin (GLUCOPHAGE) 850 MG tablet Take 850 mg by mouth 2 times daily (with meals). Multiple Vitamin (MULTIVITAMIN PO) Take 1 tablet by mouth daily. vitamin B-12 (CYANOCOBALAMIN) 1000 MCG tablet Take 500 mcg by mouth daily. Activity: activity as tolerated  Diet: regular diet  Wound Care: none needed    Follow-up with pcp in 1 week.     Signed:  Micky Bass MD  Time spent: 37 mins    4/26/2019  4:21 PM

## 2019-04-26 NOTE — PROGRESS NOTES
Discharge instructions, follow up appointments and medications reviewed with pt. Pt denies any questions at this time. IV removed intact with no complications and dry dressing applied. All pt belongings gathered and put into bags. Call light in reach.

## 2019-04-26 NOTE — PLAN OF CARE
Problem: Pain:  Goal: Control of acute pain  Description  Control of acute pain  Outcome: Ongoing   Pt admitted with chest pain. Pt has had no complaints of chest pain this shift. Will continue to monitor.

## 2019-04-26 NOTE — PROGRESS NOTES
Pt is Ind, A&O x 4. No c/o chest pain or SOB. Tele monitor is in place. VSS. Pt verbalizes no needs at this time. Bed is locked and low and call light is within reach. Will cont to monitor.

## 2019-04-26 NOTE — PROGRESS NOTES
Patient instructed on Jay Protocol Stress Test Procedure including possible side effects and adverse reactions. Verbalizes knowledge and understanding and denies having any questions.

## 2019-05-13 ENCOUNTER — OFFICE VISIT (OUTPATIENT)
Dept: CARDIOLOGY CLINIC | Age: 69
End: 2019-05-13
Payer: MEDICARE

## 2019-05-13 VITALS
DIASTOLIC BLOOD PRESSURE: 83 MMHG | HEIGHT: 60 IN | SYSTOLIC BLOOD PRESSURE: 135 MMHG | HEART RATE: 80 BPM | BODY MASS INDEX: 29.45 KG/M2 | WEIGHT: 150 LBS

## 2019-05-13 DIAGNOSIS — I48.92 ATRIAL FLUTTER WITH RAPID VENTRICULAR RESPONSE (HCC): ICD-10-CM

## 2019-05-13 DIAGNOSIS — I25.119 CORONARY ARTERY DISEASE INVOLVING NATIVE CORONARY ARTERY OF NATIVE HEART WITH ANGINA PECTORIS (HCC): Primary | ICD-10-CM

## 2019-05-13 PROCEDURE — G8400 PT W/DXA NO RESULTS DOC: HCPCS | Performed by: INTERNAL MEDICINE

## 2019-05-13 PROCEDURE — G8419 CALC BMI OUT NRM PARAM NOF/U: HCPCS | Performed by: INTERNAL MEDICINE

## 2019-05-13 PROCEDURE — 1090F PRES/ABSN URINE INCON ASSESS: CPT | Performed by: INTERNAL MEDICINE

## 2019-05-13 PROCEDURE — 4040F PNEUMOC VAC/ADMIN/RCVD: CPT | Performed by: INTERNAL MEDICINE

## 2019-05-13 PROCEDURE — 4004F PT TOBACCO SCREEN RCVD TLK: CPT | Performed by: INTERNAL MEDICINE

## 2019-05-13 PROCEDURE — G8427 DOCREV CUR MEDS BY ELIG CLIN: HCPCS | Performed by: INTERNAL MEDICINE

## 2019-05-13 PROCEDURE — G8598 ASA/ANTIPLAT THER USED: HCPCS | Performed by: INTERNAL MEDICINE

## 2019-05-13 PROCEDURE — 93000 ELECTROCARDIOGRAM COMPLETE: CPT | Performed by: INTERNAL MEDICINE

## 2019-05-13 PROCEDURE — 3017F COLORECTAL CA SCREEN DOC REV: CPT | Performed by: INTERNAL MEDICINE

## 2019-05-13 PROCEDURE — 99204 OFFICE O/P NEW MOD 45 MIN: CPT | Performed by: INTERNAL MEDICINE

## 2019-05-13 PROCEDURE — 1123F ACP DISCUSS/DSCN MKR DOCD: CPT | Performed by: INTERNAL MEDICINE

## 2019-05-13 NOTE — PROGRESS NOTES
[address]   Electrophysiology Consultation   Date: 5/13/2019  Reason for Consultation: Atrial Flutter  Consult Requesting Physician: Milind Miller     CC: Atrial Flutter  HPI: Capri Mckeon is a 79 y.o. with a PMH of CAD, DM, HTN and HLD. She attests to smoking cigarettes and does not drink alcohol. She lost 170 lbs after gastric bypass. Gael Briscoe states that she went to the hospital and felt her heart racing. She had several episodes prior to going to the hospital that sometimes lasted days. The episodes she had some feelings of being breathless. Denies palpitations or blacking out. Since hospitalization and medical therapy with diltiazem she's been feeling better. Hasn't had recurrent episodes of palpitation. Denies having any syncope. Past Medical History:   Diagnosis Date    Arthritis     CAD (coronary artery disease)     Diabetes mellitus ([facility] [address]     Hyperlipidemia     Hypertension         Past Surgical History:   Procedure Laterality Date    CARDIAC SURGERY  2001    stent    CHOLECYSTECTOMY      in 2007    CORONARY ARTERY BYPASS GRAFT  1993    DILATATION, ESOPHAGUS      gastric bypass    GASTRIC BYPASS SURGERY         Allergies   Allergen Reactions    Codeine Rash    Demerol Rash       Social History:   reports that she has been smoking cigarettes. She has a 30.00 pack-year smoking history. She has never used smokeless tobacco. She reports that she does not drink alcohol or use drugs. Family History:  family history includes Cancer in her father, maternal aunt, and mother; Diabetes in her father; Heart Disease in her father. Review of System:  All other systems reviewed except for that noted above.  Pertinent negatives and positives are:      General: negative for fever, chills   Ophthalmic ROS: negative for - eye pain or loss of vision  ENT ROS: negative for - headaches, sore throat   Respiratory: negative for - cough, sputum  Cardiovascular: Reviewed in HPI  Gastrointestinal: negative for - abdominal pain, diarrhea, N/V  Hematology: negative for - bleeding, blood clots, bruising or jaundice  Genito-Urinary:  negative for - Dysuria or incontinence  Musculoskeletal: negative for - Joint swelling, muscle pain  Neurological: negative for - confusion, dizziness, headaches   Psychiatric: No anxiety, no depression. Dermatological: negative for - rash    Physical Examination:  Vitals:    19 1506   BP: 135/83   Pulse: 80      Wt Readings from Last 3 Encounters:   19 150 lb (68 kg)   19 151 lb 3.2 oz (68.6 kg)   19 147 lb (66.7 kg)       · Constitutional: Oriented. No distress. · Head: Normocephalic and atraumatic. · Mouth/Throat: Oropharynx is clear and moist.   · Eyes: Conjunctivae normal. EOM are normal.   · Neck: Neck supple. No rigidity. No JVD present. · Cardiovascular: Normal rate, regular rhythm, S1&S2. · Pulmonary/Chest: Bilateral respiratory sounds. No wheezes, No rhonchi. · Abdominal: Soft. Bowel sounds present. No distension, No tenderness. · Musculoskeletal: No tenderness. No edema    · Lymphadenopathy: Has no cervical adenopathy. · Neurological: Alert and oriented. Cranial nerve appears intact, No Gross deficit   · Skin: Skin is warm and dry. No rash noted. · Psychiatric: Has a normal behavior       Labs, diagnostic and imaging results reviewed. Reviewed. Lab Results   Component Value Date    TSH 1.53 2019    CREATININE 0.6 2019    CREATININE 0.6 2019    AST 19 2019    ALT 20 2019       EC19  Sinus Rhythm       Echo: 3/30/19  Summary   -Normal left ventricle size, wall thickness, and systolic function with an   estimated ejection fraction of 55%.  -Indeterminate diastolic function.   -Mitral annular calcification is present. Mild mitral regurgitation.   -The left atrium is mildly dilated.   -The aortic valve appears sclerotic but opens well.   -Mild tricuspid alternative of each treatment options were explained. All questions answered. Antiarrhythmic therapy limited to amiodarone which is not recommended due to history of smoking and COPD. Ablation discussed with patient in detail. Risks benefits and alternatives were discussed. She will consider it if she has recurrent atrial fibrillation/flutter. Patient has a [id number] Score of at least 3, Elisquis 5 mg BID   Cardizem 30mg three times a day     Afib risk factors including age, HTN, obesity, inactivity and BRIAN were discussed with patient. Risk factor modification recommended. All questions were answered. HTN   Home BP monitoring encourage with a BP goal <130/80  Vitals:    05/13/19 1506   BP: 135/83   Pulse: 80       CAD: s/p CABG and PCI   ASA    Lisinopril. Lovastatin. Metoprolol. Smoking cessation discussed. - The patient is counseled to follow a low salt diet to assure blood pressure remains controlled for cardiovascular risk factor modification.   - The patient is counseled to avoid excess caffeine, and energy drinks as this may exacerbated ectopy and arrhythmia. - The patient is counseled to get regular exercise 3-5 times per week to control cardiovascular risk factors. - The patient is counseled to lose weigt to control cardiovascular risk factors. Thank you for allowing me to participate in the care of [address] [place] [address]. Further evaluation will be based upon the patient's clinical course and testing results. All questions and concerns were addressed to the patient/family. Alternatives to my treatment were discussed. I have discussed the above stated plan and the patient verbalized understanding and agreed with the plan. NOTE: This report was transcribed using voice recognition software. Every effort was made to ensure accuracy, however, inadvertent computerized transcription errors may be present.        Kaitlynn Flores MD, MPH  [facility]   Office: [phone] [facility] attestation: This note was scribed in the presence of Kalina Cordova MD by Thien Corral RN    Physician Attestation: I, Dr. Kalina Cordova, confirm that the scribe's documentation has been prepared under my direction and personally reviewed by me in its entirety. I also confirm that the note above accurately reflects all work, treatment, procedures, and medical decision making performed by me.

## 2019-11-05 ENCOUNTER — OFFICE VISIT (OUTPATIENT)
Dept: CARDIOLOGY CLINIC | Age: 69
End: 2019-11-05
Payer: MEDICARE

## 2019-11-05 VITALS
HEIGHT: 60 IN | BODY MASS INDEX: 31.02 KG/M2 | SYSTOLIC BLOOD PRESSURE: 151 MMHG | HEART RATE: 65 BPM | DIASTOLIC BLOOD PRESSURE: 82 MMHG | WEIGHT: 158 LBS | RESPIRATION RATE: 18 BRPM

## 2019-11-05 DIAGNOSIS — I48.92 ATRIAL FLUTTER WITH RAPID VENTRICULAR RESPONSE (HCC): Primary | ICD-10-CM

## 2019-11-05 PROCEDURE — G8484 FLU IMMUNIZE NO ADMIN: HCPCS | Performed by: INTERNAL MEDICINE

## 2019-11-05 PROCEDURE — 3017F COLORECTAL CA SCREEN DOC REV: CPT | Performed by: INTERNAL MEDICINE

## 2019-11-05 PROCEDURE — 1123F ACP DISCUSS/DSCN MKR DOCD: CPT | Performed by: INTERNAL MEDICINE

## 2019-11-05 PROCEDURE — G8400 PT W/DXA NO RESULTS DOC: HCPCS | Performed by: INTERNAL MEDICINE

## 2019-11-05 PROCEDURE — 4040F PNEUMOC VAC/ADMIN/RCVD: CPT | Performed by: INTERNAL MEDICINE

## 2019-11-05 PROCEDURE — G8417 CALC BMI ABV UP PARAM F/U: HCPCS | Performed by: INTERNAL MEDICINE

## 2019-11-05 PROCEDURE — G8598 ASA/ANTIPLAT THER USED: HCPCS | Performed by: INTERNAL MEDICINE

## 2019-11-05 PROCEDURE — G8427 DOCREV CUR MEDS BY ELIG CLIN: HCPCS | Performed by: INTERNAL MEDICINE

## 2019-11-05 PROCEDURE — 99214 OFFICE O/P EST MOD 30 MIN: CPT | Performed by: INTERNAL MEDICINE

## 2019-11-05 PROCEDURE — 1090F PRES/ABSN URINE INCON ASSESS: CPT | Performed by: INTERNAL MEDICINE

## 2019-11-05 PROCEDURE — 4004F PT TOBACCO SCREEN RCVD TLK: CPT | Performed by: INTERNAL MEDICINE

## 2019-11-05 PROCEDURE — 93000 ELECTROCARDIOGRAM COMPLETE: CPT | Performed by: INTERNAL MEDICINE

## 2020-04-13 PROBLEM — Z72.0 TOBACCO ABUSE: Status: ACTIVE | Noted: 2020-04-13

## 2020-06-08 PROBLEM — E66.811 CLASS 1 OBESITY DUE TO EXCESS CALORIES WITH SERIOUS COMORBIDITY AND BODY MASS INDEX (BMI) OF 30.0 TO 30.9 IN ADULT: Status: ACTIVE | Noted: 2020-06-08

## 2020-06-08 PROBLEM — I48.0 PAF (PAROXYSMAL ATRIAL FIBRILLATION) (HCC): Status: ACTIVE | Noted: 2020-06-08

## 2020-06-08 PROBLEM — I48.92 ATRIAL FLUTTER WITH RAPID VENTRICULAR RESPONSE (HCC): Status: RESOLVED | Noted: 2019-03-29 | Resolved: 2020-06-08

## 2020-06-08 PROBLEM — E66.09 CLASS 1 OBESITY DUE TO EXCESS CALORIES WITH SERIOUS COMORBIDITY AND BODY MASS INDEX (BMI) OF 30.0 TO 30.9 IN ADULT: Status: ACTIVE | Noted: 2020-06-08

## 2020-06-08 NOTE — PROGRESS NOTES
Wilson Gillespie MD   metoprolol tartrate (LOPRESSOR) 50 MG tablet Take 1 tablet by mouth 2 times daily Yes Demetria Bernard MD   diltiazem (CARDIZEM) 30 MG tablet Take 1 tablet by mouth every 8 hours Yes Demetria Bernard MD   famotidine (PEPCID) 20 MG tablet Take 1 tablet by mouth 2 times daily Yes Demetria Bernard MD   magnesium oxide (MAG-OX) 400 MG tablet Take 1 tablet by mouth daily Yes Demetria Bernard MD   ferrous sulfate 325 (65 FE) MG tablet Take 325 mg by mouth daily (with breakfast). Yes Historical Provider, MD   aspirin 81 MG tablet Take 1 tablet by mouth daily. Yes Graeme Carias MD   calcium carbonate (OSCAL) 500 MG TABS tablet Take 600 mg by mouth 2 times daily. Yes Historical Provider, MD   lovastatin (MEVACOR) 20 MG tablet Take 20 mg by mouth nightly. Yes Historical Provider, MD   metformin (GLUCOPHAGE) 850 MG tablet Take 850 mg by mouth 2 times daily (with meals). Yes Historical Provider, MD   Multiple Vitamin (MULTIVITAMIN PO) Take 1 tablet by mouth daily. Yes Historical Provider, MD   vitamin B-12 (CYANOCOBALAMIN) 1000 MCG tablet Take 500 mcg by mouth daily. Yes Historical Provider, MD   tramadol (ULTRAM) 50 MG tablet Take 50 mg by mouth every 6 hours as needed. Historical Provider, MD      Past Medical History:  Past Medical History:   Diagnosis Date    Arthritis     CAD (coronary artery disease)     Diabetes mellitus ([facility] [address]     Hyperlipidemia     Hypertension      Past Surgical History:    has a past surgical history that includes Coronary artery bypass graft (1993); Gastric bypass surgery; Cardiac surgery (2001); Dilatation, esophagus; and Cholecystectomy. Social History:  Reviewed. reports that she has been smoking cigarettes. She has a 30.00 pack-year smoking history. She has never used smokeless tobacco. She reports that she does not drink alcohol or use drugs. Family History:  Reviewed.  family history includes Cancer in her father, maternal aunt, and mother; Diabetes in

## 2020-06-26 ENCOUNTER — OFFICE VISIT (OUTPATIENT)
Dept: CARDIOLOGY CLINIC | Age: 70
End: 2020-06-26
Payer: MEDICARE

## 2020-06-26 VITALS
DIASTOLIC BLOOD PRESSURE: 80 MMHG | SYSTOLIC BLOOD PRESSURE: 170 MMHG | HEART RATE: 70 BPM | HEIGHT: 60 IN | BODY MASS INDEX: 32 KG/M2 | WEIGHT: 163 LBS

## 2020-06-26 PROCEDURE — 4004F PT TOBACCO SCREEN RCVD TLK: CPT | Performed by: NURSE PRACTITIONER

## 2020-06-26 PROCEDURE — 3017F COLORECTAL CA SCREEN DOC REV: CPT | Performed by: NURSE PRACTITIONER

## 2020-06-26 PROCEDURE — 93000 ELECTROCARDIOGRAM COMPLETE: CPT | Performed by: NURSE PRACTITIONER

## 2020-06-26 PROCEDURE — 4040F PNEUMOC VAC/ADMIN/RCVD: CPT | Performed by: NURSE PRACTITIONER

## 2020-06-26 PROCEDURE — G8417 CALC BMI ABV UP PARAM F/U: HCPCS | Performed by: NURSE PRACTITIONER

## 2020-06-26 PROCEDURE — 99214 OFFICE O/P EST MOD 30 MIN: CPT | Performed by: NURSE PRACTITIONER

## 2020-06-26 PROCEDURE — 1123F ACP DISCUSS/DSCN MKR DOCD: CPT | Performed by: NURSE PRACTITIONER

## 2020-06-26 PROCEDURE — G8400 PT W/DXA NO RESULTS DOC: HCPCS | Performed by: NURSE PRACTITIONER

## 2020-06-26 PROCEDURE — 1090F PRES/ABSN URINE INCON ASSESS: CPT | Performed by: NURSE PRACTITIONER

## 2020-06-26 PROCEDURE — G8427 DOCREV CUR MEDS BY ELIG CLIN: HCPCS | Performed by: NURSE PRACTITIONER

## 2020-06-26 RX ORDER — LISINOPRIL 20 MG/1
20 TABLET ORAL 2 TIMES DAILY
Qty: 60 TABLET | Refills: 0 | Status: SHIPPED
Start: 2020-06-26

## 2020-06-26 NOTE — PATIENT INSTRUCTIONS
Plan:  1. Check carotid ultrasound  2. Continue medications as prescribed  3. Monitor BP daily. Call if consistently >140  4.  Reduce cigarette usage    F/U: Follow-up with EP in 6 months  -Call Asim at [phone] with any questions

## 2021-07-08 NOTE — PROGRESS NOTES
Pt back from stress test. VSS and pt denies pain or any other needs. Call light in reach. Drysol Counseling:  I discussed with the patient the risks of drysol/aluminum chloride including but not limited to skin rash, itching, irritation, burning.

## 2021-10-11 ENCOUNTER — TELEPHONE (OUTPATIENT)
Dept: CARDIOLOGY CLINIC | Age: 71
End: 2021-10-11

## 2021-10-11 NOTE — TELEPHONE ENCOUNTER
She was seen at [facility] 10/5/21 for AF. Her electrolytes were low she was given IV's and told to see her cardiologist . She was last seen 6/2020. Please review [facility] ER notes and advise on appt .

## 2021-11-15 ENCOUNTER — OFFICE VISIT (OUTPATIENT)
Dept: CARDIOLOGY CLINIC | Age: 71
End: 2021-11-15
Payer: MEDICARE

## 2021-11-15 VITALS
HEART RATE: 69 BPM | OXYGEN SATURATION: 98 % | BODY MASS INDEX: 28.78 KG/M2 | SYSTOLIC BLOOD PRESSURE: 170 MMHG | WEIGHT: 146.6 LBS | DIASTOLIC BLOOD PRESSURE: 80 MMHG | HEIGHT: 60 IN

## 2021-11-15 DIAGNOSIS — E66.09 CLASS 1 OBESITY DUE TO EXCESS CALORIES WITH SERIOUS COMORBIDITY AND BODY MASS INDEX (BMI) OF 30.0 TO 30.9 IN ADULT: ICD-10-CM

## 2021-11-15 DIAGNOSIS — I25.119 CORONARY ARTERY DISEASE INVOLVING NATIVE CORONARY ARTERY OF NATIVE HEART WITH ANGINA PECTORIS (HCC): Primary | ICD-10-CM

## 2021-11-15 DIAGNOSIS — I10 ESSENTIAL HYPERTENSION: ICD-10-CM

## 2021-11-15 DIAGNOSIS — Z72.0 TOBACCO ABUSE: ICD-10-CM

## 2021-11-15 DIAGNOSIS — I48.0 PAF (PAROXYSMAL ATRIAL FIBRILLATION) (HCC): ICD-10-CM

## 2021-11-15 PROCEDURE — 93000 ELECTROCARDIOGRAM COMPLETE: CPT | Performed by: INTERNAL MEDICINE

## 2021-11-15 PROCEDURE — 99214 OFFICE O/P EST MOD 30 MIN: CPT | Performed by: INTERNAL MEDICINE

## 2021-11-15 RX ORDER — IBANDRONATE SODIUM 150 MG/1
150 TABLET, FILM COATED ORAL
COMMUNITY
Start: 2021-09-13 | End: 2022-05-17 | Stop reason: ALTCHOICE

## 2021-11-15 NOTE — LETTER
[facility]  EP Procedure Sheet    11/15/21  Daxa Horton  1950  EP Procedures     Pacemaker implant (single/dual)  EP Study    ICD implant (single/dual)  Atrial flutter ablation (SRINIVAS Y/N)    Biv implant ICD  Tilt Table    Biv implant PPM  Atrial fibrillation ablation (SRINIVAS Yes)    Generator Change (PPM/ICD/BiV)  SVT ablation    Lead revision (RV/LA/RA) (<1 month)  VT ablation      Lead extraction +/- upgrade (BiV/PPM/ICD)  VT Ischemic/ non-ischemic   xxx Loop implant   VT RVOT    Cardioversion  VT Left sided    SRINIVAS  AVN ablation     Equipment     Medtronic   NANCY Mapping System    [employer]  Carto Mapping System    [employer]  CryoAblation    Biotronik  Laser Lead Extraction     EP Procedures Scheduling Request    # hours Requested   Scheduled  Date:   Specific Day  Completed    Anesthesia  F/u Date:   CT surgery backup  COVID     Overnight stay      Location [facility]        Pre-Procedure Labs / Imaging     PT/INR  Type & cross    CBC  Units PRBC    BMP/Mg  Units FFP    Venogram  Cardiac CTA for Pulmonary vein mapping     RN INITIALS: DW      Patient Instructions  Do not eat or drink after midnight the night prior to procedure  Dx: paroxysmal atrial fibrillation   On Eliquis

## 2021-11-15 NOTE — PROGRESS NOTES
visit 6/26/20  Yes Janay Azevedo APRN - CNP   apixaban (ELIQUIS) 5 MG TABS tablet Take 1 tablet by mouth 2 times daily 3/31/19  Yes Delio Lubin MD   metoprolol tartrate (LOPRESSOR) 50 MG tablet Take 1 tablet by mouth 2 times daily 3/31/19  Yes Delio Lubin MD   diltiazem (CARDIZEM) 30 MG tablet Take 1 tablet by mouth every 8 hours 3/31/19  Yes Delio Lubin MD   famotidine (PEPCID) 20 MG tablet Take 1 tablet by mouth 2 times daily 3/31/19  Yes Delio Lubin MD   magnesium oxide (MAG-OX) 400 MG tablet Take 1 tablet by mouth daily 3/31/19  Yes Delio Lubin MD   ferrous sulfate 325 (65 FE) MG tablet Take 325 mg by mouth daily (with breakfast). Yes Historical Provider, MD   aspirin 81 MG tablet Take 1 tablet by mouth daily. 6/26/13  Yes Dalila Pitt MD   calcium carbonate (OSCAL) 500 MG TABS tablet Take 600 mg by mouth 2 times daily. Yes Historical Provider, MD   lovastatin (MEVACOR) 20 MG tablet Take 20 mg by mouth nightly. Yes Historical Provider, MD   metformin (GLUCOPHAGE) 850 MG tablet Take 850 mg by mouth 2 times daily (with meals). Yes Historical Provider, MD   Multiple Vitamin (MULTIVITAMIN PO) Take 1 tablet by mouth daily. Yes Historical Provider, MD   vitamin B-12 (CYANOCOBALAMIN) 1000 MCG tablet Take 500 mcg by mouth daily. Yes Historical Provider, MD   ibandronate (BONIVA) 150 MG tablet Take 150 mg by mouth  Patient not taking: Reported on 11/15/2021 9/13/21 9/13/22  Historical Provider, MD       Social History:   reports that she has been smoking cigarettes. She has a 30.00 pack-year smoking history. She has never used smokeless tobacco. She reports that she does not drink alcohol and does not use drugs. Family History:  family history includes Cancer in her father, maternal aunt, and mother; Diabetes in her father; Heart Disease in her father. Reviewed.  Denies family history of sudden cardiac death, arrhythmia, premature CAD    Review of System:  Pertinent positive and negatives are in the HPI, the rest are negative. Physical Examination:  BP (!) 170/80   Pulse 69   Ht 5' (1.524 m)   Wt 146 lb 9.6 oz (66.5 kg)   SpO2 98%   BMI 28.63 kg/m²      · Constitutional: Oriented. No distress. · Head: Normocephalic and atraumatic. · Mouth/Throat: Oropharynx is clear and moist.   · Eyes: Conjunctivae normal. EOM are normal.   · Neck: Normal range of motion. Neck supple. No rigidity. No JVD present. · Cardiovascular: Normal rate, regular rhythm, S1&S2 and intact distal pulses. · Pulmonary/Chest: Bilateral respiratory sounds. No wheezes. No rhonchi. · Abdominal: Soft. Bowel sounds present. No distension, No tenderness. · Musculoskeletal: No tenderness. No edema    · Lymphadenopathy: Has no cervical adenopathy. · Neurological: Alert and oriented. Cranial nerve appears intact, No Gross deficit   · Skin: Skin is warm and dry. No rash noted. · Psychiatric: Has a normal mood, affect and behavior     Labs:  Reviewed. ECG: reviewed, Sinus  rhythm with v-rate of  66 bpm with QRS duration 74  ms. No ventricular pre-excitation, or QT prolongation. Studies:   1. Event monitor:       2. Echo: 3/19   -Normal left ventricle size, wall thickness, and systolic function with an   estimated ejection fraction of 55%.  -Indeterminate diastolic function.   -Mitral annular calcification is present. Mild mitral regurgitation.   -The left atrium is mildly dilated.   -The aortic valve appears sclerotic but opens well.   -Mild tricuspid regurgitation with PASP of 40 mmHg.   -Mild pulmonic regurgitation present      3. Stress Test:    GXT: 4/19   There is normal isotope uptake at stress and rest. There is no evidence of   myocardial ischemia or scar.   Normal LV function.   Hypertension   Overall findings represent a low risk scan.       4. Cath:   2014  Patent LIMA to LAD, occluded Cx with small collaterals  I independently reviewed the ECG, MCOT, echocardiogram, stress test, and coronary angiography/PCI results and used them for my plan of care. Procedures:  1. [id number] Score for Atrial Fibrillation Stroke Risk   Risk   Factors  Component Value   C CHF No 0   H HTN Yes 1   A2 Age >= 76 No,  (77 y.o.) 0   D DM Yes 1   S2 Prior Stroke/TIA No 0   V Vascular Disease No 1   A Age 74-69 Yes,  (77 y.o.) 1   Sc Sex female 1    [id number]  Score  5   Score last updated 11/15/21 1:17 AM EST    Click here for a link to the UpToDate guideline \"Atrial Fibrillation: Anticoagulation therapy to prevent embolization    Disclaimer: Risk Score calculation is dependent on accuracy of patient problem list and past encounter diagnosis. Assessment/Plan:   1. Paroxysmal Atrial Fibrillation/flutter   - EKG today Sinus rhythn    - Continue lopressor 50 mg BID, cardizem 30 mg TID     - [id number] score: 5 (Age, Gender, DM, HTN, CAD) ; [id number] Vasc score and anticoagulation discussed. High risk for stroke and thromboembolism. Anticoagulation is recommended. Risk of bleeding was discussed   ~ On Eliquis 5 mg BID; tolerating well     ~ creatinine 0.7 10/05/2021      - Afib risk factors including age, HTN, obesity, inactivity and BRIAN were discussed with patient. Risk factor modification recommended              ~ TSH 1.53 03/30/2019      - discussed physiology of atrial fibrillation   - Treatment options including cardioversion, rate control strategy, antiarrhythmics, anticoagulation and possible ablation were discussed with patient. Risks, benefits and alternative of each treatment options were explained. All questions answered    With the above discussion, for now we have agreed to monitor afib recurrence and burden using a loop recorder. If she has increasing burden or longer durations, then would proceed with either antiarrhythmics or ablation. She will continue taking anticoagulation for stroke risk reduction.                      2.HTN  - Not well controlled 170/80 - she states she checks her blood pressure every day and it is not high. - BP goal <130/80  - Home BP monitoring encouraged, printed information provided on how to accurately measure BP at home.  -Counseled to follow a low salt diet to assure blood pressure remains controlled for cardiovascular risk factor modification.   -The patient is counseled to get regular exercise 3-5 times per week and maintain a healthy weight reduce cardiovascular risk factors. - Continue Lisinopril 20 mg BId, Lopressor 50 mg BID     3. CAD   - Hx of CABG (1993) and stent 2001.   - Continue ASA, ACEI, BB, and statin   - Last seen by Dr. Marissa Yang 2019     4. Tobacco Abuse   - she smokes 15 cigarettes a day and is working on decreasing                         - Discussed benefits of cessation and risks of continued use              - Tobacco cessation counseling completed     5. Carotid Stenosis              - SIVAKUMAR 50-69% per [place] (10/17)              - On ASA, statin       6. Obesity  Body mass index is 28.63 kg/m². -Excessive weight is complicating assessment and treatment. It is placing patient at risk for multiple co-morbidities as well as early death and contributing to the patient's presentation.  -Discussed weight management with diet and exercise   - She had bariatric surgery and has lost over 200 lbs. Plan:  · Smoking cessation counseling offered. Congratulated her for cutting down to half a pack. · Congratulated her for continuing to watch her weight. She's kept off the weight for over 17 yrs since bariatric surgery. · Loop recorder. · Follow up in six months with NP       Thank you for allowing me to participate in the care of [address] [place] [address]. All questions and concerns were addressed to the patient/family. Alternatives to my treatment were discussed. Scribe attestation:  This note was scribed in the presence of Karina Pineda MD, by Dong Ramon, RN    Physician attestation: The scribe's documentation has been prepared under my direction and has been personally reviewed by me in its entirety. I confirm that the note above reflects all work, treatment, procedures, and medical decision making performed by me.      Niranjan Merida MD  Cardiac Electrophysiology  [place] [id number]

## 2021-12-11 ENCOUNTER — HOSPITAL ENCOUNTER (INPATIENT)
Age: 71
LOS: 2 days | Discharge: HOME | DRG: 065 | End: 2021-12-13
Attending: EMERGENCY MEDICINE | Admitting: INTERNAL MEDICINE
Payer: MEDICARE

## 2021-12-11 ENCOUNTER — APPOINTMENT (OUTPATIENT)
Dept: GENERAL RADIOLOGY | Age: 71
DRG: 065 | End: 2021-12-11
Payer: MEDICARE

## 2021-12-11 ENCOUNTER — APPOINTMENT (OUTPATIENT)
Dept: CT IMAGING | Age: 71
DRG: 065 | End: 2021-12-11
Payer: MEDICARE

## 2021-12-11 DIAGNOSIS — G45.9 TIA (TRANSIENT ISCHEMIC ATTACK): Primary | ICD-10-CM

## 2021-12-11 DIAGNOSIS — R53.1 ACUTE LEFT-SIDED WEAKNESS: ICD-10-CM

## 2021-12-11 PROBLEM — I63.9 ACUTE CVA (CEREBROVASCULAR ACCIDENT) (HCC): Status: ACTIVE | Noted: 2021-12-11

## 2021-12-11 LAB
A/G RATIO: 1.4 (ref 1.1–2.2)
ALBUMIN SERPL-MCNC: 4.4 G/DL (ref 3.4–5)
ALP BLD-CCNC: 79 U/L (ref 40–129)
ALT SERPL-CCNC: 20 U/L (ref 10–40)
ANION GAP SERPL CALCULATED.3IONS-SCNC: 12 MMOL/L (ref 3–16)
AST SERPL-CCNC: 22 U/L (ref 15–37)
BASOPHILS ABSOLUTE: 0.1 K/UL (ref 0–0.2)
BASOPHILS RELATIVE PERCENT: 1.1 %
BILIRUB SERPL-MCNC: <0.2 MG/DL (ref 0–1)
BUN BLDV-MCNC: 13 MG/DL (ref 7–20)
CALCIUM SERPL-MCNC: 9.4 MG/DL (ref 8.3–10.6)
CHLORIDE BLD-SCNC: 95 MMOL/L (ref 99–110)
CO2: 25 MMOL/L (ref 21–32)
CREAT SERPL-MCNC: 0.6 MG/DL (ref 0.6–1.2)
EOSINOPHILS ABSOLUTE: 0.1 K/UL (ref 0–0.6)
EOSINOPHILS RELATIVE PERCENT: 0.6 %
GFR AFRICAN AMERICAN: >60
GFR NON-AFRICAN AMERICAN: >60
GLUCOSE BLD-MCNC: 116 MG/DL (ref 70–99)
GLUCOSE BLD-MCNC: 117 MG/DL (ref 70–99)
GLUCOSE BLD-MCNC: 119 MG/DL (ref 70–99)
HCT VFR BLD CALC: 36.7 % (ref 36–48)
HEMOGLOBIN: 12.3 G/DL (ref 12–16)
INR BLD: 1.02 (ref 0.88–1.12)
LYMPHOCYTES ABSOLUTE: 1.7 K/UL (ref 1–5.1)
LYMPHOCYTES RELATIVE PERCENT: 19 %
MCH RBC QN AUTO: 30.8 PG (ref 26–34)
MCHC RBC AUTO-ENTMCNC: 33.5 G/DL (ref 31–36)
MCV RBC AUTO: 91.9 FL (ref 80–100)
MONOCYTES ABSOLUTE: 0.6 K/UL (ref 0–1.3)
MONOCYTES RELATIVE PERCENT: 6.6 %
NEUTROPHILS ABSOLUTE: 6.6 K/UL (ref 1.7–7.7)
NEUTROPHILS RELATIVE PERCENT: 72.7 %
PDW BLD-RTO: 13.4 % (ref 12.4–15.4)
PERFORMED ON: ABNORMAL
PERFORMED ON: ABNORMAL
PLATELET # BLD: 344 K/UL (ref 135–450)
PMV BLD AUTO: 7.7 FL (ref 5–10.5)
POTASSIUM REFLEX MAGNESIUM: 4.9 MMOL/L (ref 3.5–5.1)
PROTHROMBIN TIME: 11.5 SEC (ref 9.9–12.7)
RBC # BLD: 3.99 M/UL (ref 4–5.2)
SODIUM BLD-SCNC: 132 MMOL/L (ref 136–145)
TOTAL PROTEIN: 7.5 G/DL (ref 6.4–8.2)
TROPONIN: <0.01 NG/ML
TROPONIN: <0.01 NG/ML
WBC # BLD: 9.1 K/UL (ref 4–11)

## 2021-12-11 PROCEDURE — 80053 COMPREHEN METABOLIC PANEL: CPT

## 2021-12-11 PROCEDURE — 70450 CT HEAD/BRAIN W/O DYE: CPT

## 2021-12-11 PROCEDURE — 70498 CT ANGIOGRAPHY NECK: CPT

## 2021-12-11 PROCEDURE — 6370000000 HC RX 637 (ALT 250 FOR IP): Performed by: INTERNAL MEDICINE

## 2021-12-11 PROCEDURE — 99285 EMERGENCY DEPT VISIT HI MDM: CPT

## 2021-12-11 PROCEDURE — 6360000004 HC RX CONTRAST MEDICATION: Performed by: PHYSICIAN ASSISTANT

## 2021-12-11 PROCEDURE — 2060000000 HC ICU INTERMEDIATE R&B

## 2021-12-11 PROCEDURE — 36415 COLL VENOUS BLD VENIPUNCTURE: CPT

## 2021-12-11 PROCEDURE — 85610 PROTHROMBIN TIME: CPT

## 2021-12-11 PROCEDURE — 84484 ASSAY OF TROPONIN QUANT: CPT

## 2021-12-11 PROCEDURE — 93005 ELECTROCARDIOGRAM TRACING: CPT

## 2021-12-11 PROCEDURE — 71045 X-RAY EXAM CHEST 1 VIEW: CPT

## 2021-12-11 PROCEDURE — 85025 COMPLETE CBC W/AUTO DIFF WBC: CPT

## 2021-12-11 RX ORDER — INSULIN LISPRO 100 [IU]/ML
0-6 INJECTION, SOLUTION INTRAVENOUS; SUBCUTANEOUS NIGHTLY
Status: DISCONTINUED | OUTPATIENT
Start: 2021-12-11 | End: 2021-12-13 | Stop reason: HOSPADM

## 2021-12-11 RX ORDER — POLYETHYLENE GLYCOL 3350 17 G/17G
17 POWDER, FOR SOLUTION ORAL DAILY PRN
Status: DISCONTINUED | OUTPATIENT
Start: 2021-12-11 | End: 2021-12-13 | Stop reason: HOSPADM

## 2021-12-11 RX ORDER — FERROUS SULFATE 325(65) MG
325 TABLET ORAL
Status: DISCONTINUED | OUTPATIENT
Start: 2021-12-12 | End: 2021-12-13 | Stop reason: HOSPADM

## 2021-12-11 RX ORDER — DEXTROSE MONOHYDRATE 50 MG/ML
100 INJECTION, SOLUTION INTRAVENOUS PRN
Status: DISCONTINUED | OUTPATIENT
Start: 2021-12-11 | End: 2021-12-13 | Stop reason: HOSPADM

## 2021-12-11 RX ORDER — ONDANSETRON 2 MG/ML
4 INJECTION INTRAMUSCULAR; INTRAVENOUS EVERY 6 HOURS PRN
Status: DISCONTINUED | OUTPATIENT
Start: 2021-12-11 | End: 2021-12-13 | Stop reason: HOSPADM

## 2021-12-11 RX ORDER — NICOTINE POLACRILEX 4 MG
15 LOZENGE BUCCAL PRN
Status: DISCONTINUED | OUTPATIENT
Start: 2021-12-11 | End: 2021-12-13 | Stop reason: HOSPADM

## 2021-12-11 RX ORDER — INSULIN LISPRO 100 [IU]/ML
0-12 INJECTION, SOLUTION INTRAVENOUS; SUBCUTANEOUS
Status: DISCONTINUED | OUTPATIENT
Start: 2021-12-11 | End: 2021-12-13 | Stop reason: HOSPADM

## 2021-12-11 RX ORDER — ATORVASTATIN CALCIUM 40 MG/1
40 TABLET, FILM COATED ORAL NIGHTLY
Status: DISCONTINUED | OUTPATIENT
Start: 2021-12-11 | End: 2021-12-13 | Stop reason: HOSPADM

## 2021-12-11 RX ORDER — ONDANSETRON 4 MG/1
4 TABLET, ORALLY DISINTEGRATING ORAL EVERY 8 HOURS PRN
Status: DISCONTINUED | OUTPATIENT
Start: 2021-12-11 | End: 2021-12-13 | Stop reason: HOSPADM

## 2021-12-11 RX ORDER — ASPIRIN 300 MG/1
300 SUPPOSITORY RECTAL DAILY
Status: DISCONTINUED | OUTPATIENT
Start: 2021-12-12 | End: 2021-12-13 | Stop reason: HOSPADM

## 2021-12-11 RX ORDER — DEXTROSE MONOHYDRATE 25 G/50ML
12.5 INJECTION, SOLUTION INTRAVENOUS PRN
Status: DISCONTINUED | OUTPATIENT
Start: 2021-12-11 | End: 2021-12-13 | Stop reason: HOSPADM

## 2021-12-11 RX ORDER — LABETALOL HYDROCHLORIDE 5 MG/ML
10 INJECTION, SOLUTION INTRAVENOUS EVERY 10 MIN PRN
Status: DISCONTINUED | OUTPATIENT
Start: 2021-12-11 | End: 2021-12-13 | Stop reason: HOSPADM

## 2021-12-11 RX ORDER — ASPIRIN 81 MG/1
81 TABLET ORAL DAILY
Status: DISCONTINUED | OUTPATIENT
Start: 2021-12-12 | End: 2021-12-13 | Stop reason: HOSPADM

## 2021-12-11 RX ORDER — MAGNESIUM HYDROXIDE/ALUMINUM HYDROXICE/SIMETHICONE 120; 1200; 1200 MG/30ML; MG/30ML; MG/30ML
SUSPENSION ORAL
Status: DISPENSED
Start: 2021-12-11 | End: 2021-12-12

## 2021-12-11 RX ORDER — METOPROLOL TARTRATE 50 MG/1
50 TABLET, FILM COATED ORAL 2 TIMES DAILY
Status: DISCONTINUED | OUTPATIENT
Start: 2021-12-11 | End: 2021-12-13 | Stop reason: HOSPADM

## 2021-12-11 RX ADMIN — ATORVASTATIN CALCIUM 40 MG: 40 TABLET, FILM COATED ORAL at 22:53

## 2021-12-11 RX ADMIN — IOPAMIDOL 75 ML: 755 INJECTION, SOLUTION INTRAVENOUS at 13:42

## 2021-12-11 RX ADMIN — DILTIAZEM HYDROCHLORIDE 30 MG: 30 TABLET, FILM COATED ORAL at 16:45

## 2021-12-11 RX ADMIN — METOPROLOL TARTRATE 50 MG: 50 TABLET, FILM COATED ORAL at 22:53

## 2021-12-11 RX ADMIN — DILTIAZEM HYDROCHLORIDE 30 MG: 30 TABLET, FILM COATED ORAL at 22:53

## 2021-12-11 ASSESSMENT — ENCOUNTER SYMPTOMS
SHORTNESS OF BREATH: 0
DIARRHEA: 0
RHINORRHEA: 0
CONSTIPATION: 0
COUGH: 0
NAUSEA: 0
SORE THROAT: 0
ABDOMINAL PAIN: 0
EYE PAIN: 0
BACK PAIN: 0
VOMITING: 0

## 2021-12-11 ASSESSMENT — PAIN SCALES - GENERAL
PAINLEVEL_OUTOF10: 0

## 2021-12-11 NOTE — ED PROVIDER NOTES
[facility]        Pt Name: Maddie Vasquez  MRN: [id number]  [place] 1950  Date of evaluation: 12/11/2021  Provider: Brina Gutierrez PA-C  PCP: Malika SINGH  Note Started: 3:22 PM EST       I have seen and evaluated this patient with my supervising physician Ning Oconnell MD.      Triage CHIEF COMPLAINT       Chief Complaint   Patient presents with    Cerebrovascular Accident     pt brought in by squad from home with c/o left sided numbness tingling and weakness x 3 days. c/o slurring her words this morning. HISTORY OF PRESENT ILLNESS   (Location/Symptom, Timing/Onset, Context/Setting, Quality, Duration, Modifying Factors, Severity)  Note limiting factors. Chief Complaint: Left-sided weakness    Maddie Vasquez is a 73 y.o. female who presents to the emergency department via EMS, she is complaining about left-sided numbness, tingling and weakness for the past 3 days. This morning she started slurring her words, lasted for about 1 hour. She denies any pain, she admits to weakness. She does have a history of coronary artery disease, diabetes, and is current on on Eliquis. She has a history of paroxysmal atrial fibrillation. She denies any pain. Nursing Notes were all reviewed and agreed with or any disagreements were addressed in the HPI. REVIEW OF SYSTEMS    (2-9 systems for level 4, 10 or more for level 5)     Review of Systems   Constitutional: Negative for chills, diaphoresis and fever. HENT: Negative for congestion, ear pain, rhinorrhea and sore throat. Eyes: Negative for pain and visual disturbance. Respiratory: Negative for cough and shortness of breath. Cardiovascular: Negative for chest pain, palpitations and leg swelling. Gastrointestinal: Negative for abdominal pain, constipation, diarrhea, nausea and vomiting.    Genitourinary: Negative for decreased urine volume, dysuria, frequency and urgency. Musculoskeletal: Negative for back pain and neck pain. Skin: Negative for rash and wound. Neurological: Negative for dizziness and light-headedness. PAST MEDICAL HISTORY     Past Medical History:   Diagnosis Date    Arthritis     CAD (coronary artery disease)     Diabetes mellitus ([facility] [address]     Hyperlipidemia     Hypertension        SURGICAL HISTORY     Past Surgical History:   Procedure Laterality Date    CARDIAC SURGERY  2001    stent    CHOLECYSTECTOMY      in 2007    CORONARY ARTERY BYPASS GRAFT  1993    DILATATION, ESOPHAGUS      gastric bypass    GASTRIC BYPASS SURGERY         CURRENTMEDICATIONS       Previous Medications    APIXABAN (ELIQUIS) 5 MG TABS TABLET    Take 1 tablet by mouth 2 times daily    ASPIRIN 81 MG TABLET    Take 1 tablet by mouth daily. CALCIUM CARBONATE (OSCAL) 500 MG TABS TABLET    Take 600 mg by mouth 2 times daily. DILTIAZEM (CARDIZEM) 30 MG TABLET    Take 1 tablet by mouth every 8 hours    FAMOTIDINE (PEPCID) 20 MG TABLET    Take 1 tablet by mouth 2 times daily    FERROUS SULFATE 325 (65 FE) MG TABLET    Take 325 mg by mouth daily (with breakfast). IBANDRONATE (BONIVA) 150 MG TABLET    Take 150 mg by mouth    LISINOPRIL (PRINIVIL;ZESTRIL) 20 MG TABLET    Take 1 tablet by mouth 2 times daily Indications: will start after today's visit    LOVASTATIN (MEVACOR) 20 MG TABLET    Take 20 mg by mouth nightly. MAGNESIUM OXIDE (MAG-OX) 400 MG TABLET    Take 1 tablet by mouth daily    METFORMIN (GLUCOPHAGE) 850 MG TABLET    Take 850 mg by mouth 2 times daily (with meals). METOPROLOL TARTRATE (LOPRESSOR) 50 MG TABLET    Take 1 tablet by mouth 2 times daily    MULTIPLE VITAMIN (MULTIVITAMIN PO)    Take 1 tablet by mouth daily. VITAMIN B-12 (CYANOCOBALAMIN) 1000 MCG TABLET    Take 500 mcg by mouth daily.        ALLERGIES     Codeine and Demerol    FAMILYHISTORY       Family History   Problem Relation Age of Onset    Cancer Mother     Diabetes Father     Heart Disease Father     Cancer Father     Cancer Maternal Aunt         SOCIAL HISTORY       Social History     Socioeconomic History    Marital status:      Spouse name: None    Number of children: None    Years of education: None    Highest education level: None   Occupational History    None   Tobacco Use    Smoking status: Current Every Day Smoker     Packs/day: 1.00     Years: 30.00     Pack years: 30.00     Types: Cigarettes    Smokeless tobacco: Never Used    Tobacco comment: started smoking again   Vaping Use    Vaping Use: None   Substance and Sexual Activity    Alcohol use: No     Alcohol/week: 0.0 standard drinks    Drug use: No    Sexual activity: None   Other Topics Concern    None   Social History Narrative    None     Social Determinants of Health     Financial Resource Strain:     Difficulty of Paying Living Expenses: Not on file   Food Insecurity:     Worried About Running Out of Food in the Last Year: Not on file    Bonnie of Food in the Last Year: Not on file   Transportation Needs:     Lack of Transportation (Medical): Not on file    Lack of Transportation (Non-Medical):  Not on file   Physical Activity:     Days of Exercise per Week: Not on file    Minutes of Exercise per Session: Not on file   Stress:     Feeling of Stress : Not on file   Social Connections:     Frequency of Communication with Friends and Family: Not on file    Frequency of Social Gatherings with Friends and Family: Not on file    Attends Church Services: Not on file    Active Member of Clubs or Organizations: Not on file    Attends Club or Organization Meetings: Not on file    Marital Status: Not on file   Intimate Partner Violence:     Fear of Current or Ex-Partner: Not on file    Emotionally Abused: Not on file    Physically Abused: Not on file    Sexually Abused: Not on file   Housing Stability:     Unable to Pay for Housing in the Last Year: Not on file    Number of Places Lived in the Last Year: Not on file    Unstable Housing in the Last Year: Not on file       SCREENINGS   NIH Stroke Scale  Interval: Baseline  Level of Consciousness (1a. ): Alert  LOC Questions (1b. ): Answers both correctly  LOC Commands (1c. ): Performs both tasks correctly  Best Gaze (2. ): Normal  Visual (3. ): No visual loss  Facial Palsy (4. ): Normal symmetrical movement  Motor Arm, Left (5a. ): Drift, but does not hit bed  Motor Arm, Right (5b. ): No drift  Motor Leg, Left (6a. ): Drift, but does not hit bed  Motor Leg, Right (6b. ): No drift  Limb Ataxia (7. ): (!) Present in one limb  Sensory (8. ): (!) Mild to Moderate  Best Language (9. ): No aphasia  Dysarthria (10. ): Normal  Extinction and Inattention (11): No abnormality  Total: 4         PHYSICAL EXAM    (up to 7 for level 4, 8 or more for level 5)     ED Triage Vitals [12/11/21 1109]   BP Temp Temp Source Pulse Resp SpO2 Height Weight   (!) 194/73 98.1 °F (36.7 °C) Oral 67 16 94 % 5' (1.524 m) 149 lb (67.6 kg)       Physical Exam  Vitals and nursing note reviewed. Constitutional:       Appearance: Normal appearance. She is well-developed. She is not ill-appearing or diaphoretic. HENT:      Head: Normocephalic and atraumatic. Right Ear: External ear normal.      Left Ear: External ear normal.      Nose: Nose normal.   Eyes:      General: No visual field deficit. Right eye: No discharge. Left eye: No discharge. Pulmonary:      Effort: Pulmonary effort is normal. No respiratory distress. Breath sounds: No stridor. Musculoskeletal:         General: Normal range of motion. Cervical back: Normal range of motion and neck supple. Skin:     General: Skin is warm and dry. Coloration: Skin is not pale. Neurological:      General: No focal deficit present. Mental Status: She is alert and oriented to person, place, and time. GCS: GCS eye subscore is 4. GCS verbal subscore is 5.  GCS motor subscore is 6. Cranial Nerves: No cranial nerve deficit, dysarthria or facial asymmetry. Motor: Weakness and pronator drift present. No tremor, atrophy, abnormal muscle tone or seizure activity. Coordination: Coordination normal. Finger-Nose-Finger Test and Heel to Shin Test normal.      Gait: Gait normal.      Comments: Mild left-sided arm weakness when compared bilaterally, minimal pronator drift   Psychiatric:         Mood and Affect: Mood normal.         Behavior: Behavior normal.         DIAGNOSTIC RESULTS   LABS:    Labs Reviewed   CBC WITH AUTO DIFFERENTIAL - Abnormal; Notable for the following components:       Result Value    RBC 3.99 (*)     All other components within normal limits    Narrative:     Performed at:  [facility] [address]   Phone [phone]   COMPREHENSIVE METABOLIC PANEL W/ REFLEX TO MG FOR LOW K - Abnormal; Notable for the following components:    Sodium 132 (*)     Chloride 95 (*)     Glucose 119 (*)     All other components within normal limits    Narrative:     Performed at:  [facility]  [address]   Phone [phone]   TROPONIN    Narrative:     Performed at:  [facility] [address]   Phone [phone]   PROTIME-INR    Narrative:     Performed at:  [facility]  [address]   Phone [phone]   POCT GLUCOSE       When ordered, only abnormal lab results are displayed. All other labs were within normal range or not returned as of this dictation. EKG: When ordered, EKG's are interpreted by the Emergency Department Physician in the absence of a cardiologist.  Please see their note for interpretation of EKG.       RADIOLOGY:   Non-plain film images such as CT, Ultrasound and MRI are read by the radiologist. Plain radiographic images are visualized andpreliminarily interpreted by the  ED Provider with the below findings:        Interpretation perthe Radiologist below, if available at the time of this note:     [address]   Final Result   Moderate calcified plaque formation within the bilateral carotid bulb   regions, without hemodynamically significant carotid stenosis. Otherwise, unremarkable CT angiogram of the head and neck, with no evidence   of vessel dissection or irregularity, or proximal intracranial arterial   occlusion. Comment:      Reference per NASCET criteria for degree of stenosis:  Mild: Less than 50%   stenosis. Moderate: 50-69% stenosis. Severe: 70-94% stenosis. Near-occlusion: 95-99% stenosis. CT HEAD WO CONTRAST   Final Result   No evidence of acute intracranial process. Age-appropriate atrophy and   moderate chronic small vessel ischemic changes noted. XR CHEST PORTABLE   Final Result   No radiographic evidence of acute pulmonary disease. CT HEAD WO CONTRAST    Result Date: 12/11/2021  EXAMINATION: CT OF THE HEAD WITHOUT CONTRAST  12/11/2021 12:41 pm TECHNIQUE: CT of the head was performed without the administration of intravenous contrast. Dose modulation, iterative reconstruction, and/or weight based adjustment of the mA/kV was utilized to reduce the radiation dose to as low as reasonably achievable. COMPARISON: None. HISTORY: ORDERING SYSTEM PROVIDED HISTORY: Stroke Symptoms TECHNOLOGIST PROVIDED HISTORY: Has a \"code stroke\" or \"stroke alert\" been called? ->No Reason for exam:->Stroke Symptoms Decision Support Exception - unselect if not a suspected or confirmed emergency medical condition->Emergency Medical Condition (MA) Reason for Exam: stroke symptoms FINDINGS: BRAIN/VENTRICLES: The gyri and sulci have a normal appearance. There is cortical and cerebellar volume loss with associated ex vacuo ventricular dilation, appropriate for age.   Moderate diffuse evidence of vessel dissection or irregularity, or proximal intracranial arterial occlusion. Comment: Reference per NASCET criteria for degree of stenosis:  Mild: Less than 50% stenosis. Moderate: 50-69% stenosis. Severe: 70-94% stenosis. Near-occlusion: 95-99% stenosis. PROCEDURES   Unless otherwise noted below, none     Procedures    CRITICAL CARE TIME   N/A    CONSULTS:  IP CONSULT TO PHARMACY  PHARMACY TO CHANGE BASE FLUIDS      EMERGENCY DEPARTMENT COURSE and DIFFERENTIAL DIAGNOSIS/MDM:   Vitals:    Vitals:    12/11/21 1430 12/11/21 1445 12/11/21 1500 12/11/21 1515   BP: (!) 169/65 (!) 185/111 (!) 191/71 (!) 201/96   Pulse:  86 76 86   Resp:  18 20 16   Temp:       TempSrc:       SpO2:       Weight:       Height:           Patient was given thefollowing medications:  Medications   aluminum & magnesium hydroxide-simethicone (MAALOX) 200-200-20 MG/5ML suspension (has no administration in time range)   iopamidol (ISOVUE-370) 76 % injection 75 mL (75 mLs IntraVENous Given 12/11/21 1342)           This patient has signs and symptoms consistent with a TIA, concern is for an acute CVA. The patient does have a history of paroxysmal A. fib, currently not in A. fib at this time, we will admit to the hospitalist for continued evaluation. FINAL IMPRESSION      1. TIA (transient ischemic attack)    2. Acute left-sided weakness          DISPOSITION/PLAN   DISPOSITION Decision To Admit 12/11/2021 03:20:24 PM      PATIENT REFERREDTO:  No follow-up provider specified.     DISCHARGE MEDICATIONS:  New Prescriptions    No medications on file       DISCONTINUED MEDICATIONS:  Discontinued Medications    No medications on file              (Please note that portions ofthis note were completed with a voice recognition program.  Efforts were made to edit the dictations but occasionally words are mis-transcribed.)    Juani Pichardo PA-C (electronically signed)             Juani Pichardo PA-C  12/11/21 9529

## 2021-12-11 NOTE — H&P
Negative for myalgias and arthralgias  Neurologic: see above  Skin: Negative for itching,rash  Psychiatric: Negative for depression,anxiety, agitation. Endocrine: Negative for polydipsia,polyuria,heat /cold intolerance. Past Medical History:   has a past medical history of Acute CVA (cerebrovascular accident) ([facility] [address] Arthritis, CAD (coronary artery disease), Diabetes mellitus ([facility] [address] Hyperlipidemia, and Hypertension. Past Surgical History:   has a past surgical history that includes Coronary artery bypass graft (1993); Gastric bypass surgery; Cardiac surgery (2001); Dilatation, esophagus; and Cholecystectomy. Medications:  No current facility-administered medications on file prior to encounter. Current Outpatient Medications on File Prior to Encounter   Medication Sig Dispense Refill    lisinopril (PRINIVIL;ZESTRIL) 20 MG tablet Take 1 tablet by mouth 2 times daily Indications: will start after today's visit 60 tablet 0    apixaban (ELIQUIS) 5 MG TABS tablet Take 1 tablet by mouth 2 times daily 60 tablet 1    metoprolol tartrate (LOPRESSOR) 50 MG tablet Take 1 tablet by mouth 2 times daily 60 tablet 2    diltiazem (CARDIZEM) 30 MG tablet Take 1 tablet by mouth every 8 hours 90 tablet 1    famotidine (PEPCID) 20 MG tablet Take 1 tablet by mouth 2 times daily 60 tablet 3    magnesium oxide (MAG-OX) 400 MG tablet Take 1 tablet by mouth daily 30 tablet 1    ferrous sulfate 325 (65 FE) MG tablet Take 325 mg by mouth daily (with breakfast).  aspirin 81 MG tablet Take 1 tablet by mouth daily. 30 tablet 6    calcium carbonate (OSCAL) 500 MG TABS tablet Take 600 mg by mouth 2 times daily.  lovastatin (MEVACOR) 20 MG tablet Take 20 mg by mouth nightly.  metformin (GLUCOPHAGE) 850 MG tablet Take 850 mg by mouth 2 times daily (with meals).  Multiple Vitamin (MULTIVITAMIN PO) Take 1 tablet by mouth daily.       vitamin B-12 (CYANOCOBALAMIN) 1000 MCG tablet Take 500 mcg by mouth daily.      ibandronate (BONIVA) 150 MG tablet Take 150 mg by mouth (Patient not taking: Reported on 11/15/2021)         Allergies: Allergies   Allergen Reactions    Codeine Rash    Demerol Rash        Social History:  Patient Lives at home   reports that she has been smoking cigarettes. She has a 30.00 pack-year smoking history. She has never used smokeless tobacco. She reports that she does not drink alcohol and does not use drugs. Family History:  family history includes Cancer in her father, maternal aunt, and mother; Diabetes in her father; Heart Disease in her father. ,     Physical Exam:  BP (!) 201/96   Pulse 86   Temp 98.1 °F (36.7 °C) (Oral)   Resp 16   Ht 5' (1.524 m)   Wt 149 lb (67.6 kg)   SpO2 94%   BMI 29.10 kg/m²     General appearance:  Appears comfortable. AAOx3  HEENT: atraumatic, Pupils equal, muscous membranes moist, no masses appreciated  Cardiovascular: Regular rate and rhythm no murmurs appreciated  Respiratory: CTAB no wheezing  Gastrointestinal: Abdomen soft, non-tender, BS+  EXT: no edema  Neurology: Med check extraocular muscles intact tongue midline no slurred speech slight dysmetria in left arm none in right arm. Strength 5 out of 5 on the right side left upper extremity 3 out of 5 left lower extremity 4 out of 5  Psychiatry: Appropriate affect.  Not agitated  Skin: Warm, dry, no rashes appreciated    Labs:  CBC:   Lab Results   Component Value Date    WBC 9.1 12/11/2021    RBC 3.99 12/11/2021    HGB 12.3 12/11/2021    HCT 36.7 12/11/2021    MCV 91.9 12/11/2021    MCH 30.8 12/11/2021    MCHC 33.5 12/11/2021    RDW 13.4 12/11/2021     12/11/2021    MPV 7.7 12/11/2021     BMP:    Lab Results   Component Value Date     12/11/2021    K 4.9 12/11/2021    CL 95 12/11/2021    CO2 25 12/11/2021    BUN 13 12/11/2021    CREATININE 0.6 12/11/2021    CALCIUM 9.4 12/11/2021    GFRAA >60 12/11/2021    LABGLOM >60 12/11/2021    GLUCOSE 119 12/11/2021    GLUCOSE 92 11/02/2011    GLUCOSE 134.1 11/02/2011     CTA HEAD NECK W CONTRAST   Final Result   Moderate calcified plaque formation within the bilateral carotid bulb   regions, without hemodynamically significant carotid stenosis. Otherwise, unremarkable CT angiogram of the head and neck, with no evidence   of vessel dissection or irregularity, or proximal intracranial arterial   occlusion. Comment:      Reference per NASCET criteria for degree of stenosis:  Mild: Less than 50%   stenosis. Moderate: 50-69% stenosis. Severe: 70-94% stenosis. Near-occlusion: 95-99% stenosis. CT HEAD WO CONTRAST   Final Result   No evidence of acute intracranial process. Age-appropriate atrophy and   moderate chronic small vessel ischemic changes noted. XR CHEST PORTABLE   Final Result   No radiographic evidence of acute pulmonary disease. Recent imaging reviewed    Problem List  Active Problems:    Acute CVA (cerebrovascular accident) [facility])  Resolved Problems:    * No resolved hospital problems. *        Assessment/Plan:   cva vs tia  - CTA head and neck with   Moderate calcified plaque formation within the bilateral carotid bulb   regions, without hemodynamically significant carotid stenosis     - tele, mri brain echo  - already on asa  - hold eliquis until ok to restart with neuro  - statin  - check lipid panel and a1c  Pt ot speech  Neuro consult    PAF: home meds hold eliquis until ok with neuro    DM2: SSI     HTN: permissive htn for now    DVT prophylaxis scds  Code status full code        Admit as inpatient I anticipate hospitalization spanning more than two midnights for investigation and treatment of the above medically necessary diagnoses. Please note that some part of this chart was generated using Dragon dictation software. Although every effort was made to ensure the accuracy of this automated transcription, some errors in transcription may have occurred inadvertently.  If you may need any clarification, please do not hesitate to contact me through [facility].        Leda Ricardo MD    12/11/2021 3:44 PM

## 2021-12-11 NOTE — ED NOTES
Bedside report given to [facility], RN pt transferred to [facility] Via wheelchair.      Kaylene Moore RN  12/11/21 4651

## 2021-12-11 NOTE — ACP (ADVANCE CARE PLANNING)
Advanced Care Planning Note.     Purpose of Encounter: Advanced care planning in light of hospitalization  Parties In Attendance: Patient,    Decisional Capacity: Yes  Subjective: Patient  understand that this conversation is to address long term care goal  Objective: Patient mated to hospital with possible acute CVA history of A. fib coronary artery disease  Goals of Care Determination: Patient pursue CPR intubation PEG tube if required unsure about tracheostomy long-term ventilation would leave that decision up to her brother if required  Code Status: full code  Time spent on Advanced care Plannin minutes  [address] documented patient's wishes, would like Brother Gerda Casillas to make medical decisions if unable to make decisions    Raul Boss MD  2021 3:48 PM

## 2021-12-12 LAB
CHOLESTEROL, TOTAL: 145 MG/DL (ref 0–199)
EKG ATRIAL RATE: 62 BPM
EKG DIAGNOSIS: NORMAL
EKG P AXIS: 90 DEGREES
EKG P-R INTERVAL: 146 MS
EKG Q-T INTERVAL: 410 MS
EKG QRS DURATION: 68 MS
EKG QTC CALCULATION (BAZETT): 416 MS
EKG R AXIS: -7 DEGREES
EKG T AXIS: 23 DEGREES
EKG VENTRICULAR RATE: 62 BPM
ESTIMATED AVERAGE GLUCOSE: 116.9 MG/DL
GLUCOSE BLD-MCNC: 110 MG/DL (ref 70–99)
GLUCOSE BLD-MCNC: 144 MG/DL (ref 70–99)
GLUCOSE BLD-MCNC: 170 MG/DL (ref 70–99)
GLUCOSE BLD-MCNC: 94 MG/DL (ref 70–99)
HBA1C MFR BLD: 5.7 %
HCT VFR BLD CALC: 36.7 % (ref 36–48)
HDLC SERPL-MCNC: 76 MG/DL (ref 40–60)
HEMOGLOBIN: 12.5 G/DL (ref 12–16)
LDL CHOLESTEROL CALCULATED: 55 MG/DL
MCH RBC QN AUTO: 31 PG (ref 26–34)
MCHC RBC AUTO-ENTMCNC: 34 G/DL (ref 31–36)
MCV RBC AUTO: 91.2 FL (ref 80–100)
PDW BLD-RTO: 13 % (ref 12.4–15.4)
PERFORMED ON: ABNORMAL
PERFORMED ON: NORMAL
PLATELET # BLD: 308 K/UL (ref 135–450)
PMV BLD AUTO: 7.1 FL (ref 5–10.5)
RBC # BLD: 4.03 M/UL (ref 4–5.2)
TRIGL SERPL-MCNC: 69 MG/DL (ref 0–150)
TROPONIN: <0.01 NG/ML
VLDLC SERPL CALC-MCNC: 14 MG/DL
WBC # BLD: 6.1 K/UL (ref 4–11)

## 2021-12-12 PROCEDURE — 80061 LIPID PANEL: CPT

## 2021-12-12 PROCEDURE — 93010 ELECTROCARDIOGRAM REPORT: CPT | Performed by: INTERNAL MEDICINE

## 2021-12-12 PROCEDURE — 2060000000 HC ICU INTERMEDIATE R&B

## 2021-12-12 PROCEDURE — 6370000000 HC RX 637 (ALT 250 FOR IP): Performed by: INTERNAL MEDICINE

## 2021-12-12 PROCEDURE — 92610 EVALUATE SWALLOWING FUNCTION: CPT

## 2021-12-12 PROCEDURE — 97116 GAIT TRAINING THERAPY: CPT

## 2021-12-12 PROCEDURE — 36415 COLL VENOUS BLD VENIPUNCTURE: CPT

## 2021-12-12 PROCEDURE — 97161 PT EVAL LOW COMPLEX 20 MIN: CPT

## 2021-12-12 PROCEDURE — 97535 SELF CARE MNGMENT TRAINING: CPT

## 2021-12-12 PROCEDURE — 6370000000 HC RX 637 (ALT 250 FOR IP): Performed by: HOSPITALIST

## 2021-12-12 PROCEDURE — 83036 HEMOGLOBIN GLYCOSYLATED A1C: CPT

## 2021-12-12 PROCEDURE — 85027 COMPLETE CBC AUTOMATED: CPT

## 2021-12-12 PROCEDURE — 97165 OT EVAL LOW COMPLEX 30 MIN: CPT

## 2021-12-12 RX ORDER — OYSTER SHELL CALCIUM WITH VITAMIN D 500; 200 MG/1; [IU]/1
1 TABLET, FILM COATED ORAL DAILY
Status: DISCONTINUED | OUTPATIENT
Start: 2021-12-12 | End: 2021-12-12

## 2021-12-12 RX ORDER — ACETAMINOPHEN 325 MG/1
650 TABLET ORAL EVERY 4 HOURS PRN
Status: DISCONTINUED | OUTPATIENT
Start: 2021-12-12 | End: 2021-12-13 | Stop reason: HOSPADM

## 2021-12-12 RX ORDER — LANOLIN ALCOHOL/MO/W.PET/CERES
500 CREAM (GRAM) TOPICAL DAILY
Status: DISCONTINUED | OUTPATIENT
Start: 2021-12-12 | End: 2021-12-13 | Stop reason: HOSPADM

## 2021-12-12 RX ORDER — FAMOTIDINE 20 MG/1
20 TABLET, FILM COATED ORAL 2 TIMES DAILY
Status: DISCONTINUED | OUTPATIENT
Start: 2021-12-12 | End: 2021-12-13 | Stop reason: HOSPADM

## 2021-12-12 RX ORDER — LISINOPRIL 20 MG/1
20 TABLET ORAL 2 TIMES DAILY
Status: DISCONTINUED | OUTPATIENT
Start: 2021-12-12 | End: 2021-12-13 | Stop reason: HOSPADM

## 2021-12-12 RX ORDER — LANOLIN ALCOHOL/MO/W.PET/CERES
400 CREAM (GRAM) TOPICAL DAILY
Status: DISCONTINUED | OUTPATIENT
Start: 2021-12-12 | End: 2021-12-13 | Stop reason: HOSPADM

## 2021-12-12 RX ORDER — MULTIVITAMIN WITH IRON
1 TABLET ORAL
Status: DISCONTINUED | OUTPATIENT
Start: 2021-12-12 | End: 2021-12-13 | Stop reason: HOSPADM

## 2021-12-12 RX ADMIN — DILTIAZEM HYDROCHLORIDE 30 MG: 30 TABLET, FILM COATED ORAL at 21:44

## 2021-12-12 RX ADMIN — ACETAMINOPHEN 650 MG: 325 TABLET ORAL at 11:44

## 2021-12-12 RX ADMIN — FAMOTIDINE 20 MG: 20 TABLET, FILM COATED ORAL at 11:44

## 2021-12-12 RX ADMIN — METOPROLOL TARTRATE 50 MG: 50 TABLET, FILM COATED ORAL at 09:40

## 2021-12-12 RX ADMIN — DILTIAZEM HYDROCHLORIDE 30 MG: 30 TABLET, FILM COATED ORAL at 06:57

## 2021-12-12 RX ADMIN — ASPIRIN 81 MG: 81 TABLET, COATED ORAL at 09:40

## 2021-12-12 RX ADMIN — METOPROLOL TARTRATE 50 MG: 50 TABLET, FILM COATED ORAL at 21:45

## 2021-12-12 RX ADMIN — APIXABAN 5 MG: 5 TABLET, FILM COATED ORAL at 21:45

## 2021-12-12 RX ADMIN — Medication 400 MG: at 11:44

## 2021-12-12 RX ADMIN — Medication 500 MCG: at 11:44

## 2021-12-12 RX ADMIN — FAMOTIDINE 20 MG: 20 TABLET, FILM COATED ORAL at 21:45

## 2021-12-12 RX ADMIN — LISINOPRIL 20 MG: 20 TABLET ORAL at 11:44

## 2021-12-12 RX ADMIN — FERROUS SULFATE TAB 325 MG (65 MG ELEMENTAL FE) 325 MG: 325 (65 FE) TAB at 06:57

## 2021-12-12 RX ADMIN — DILTIAZEM HYDROCHLORIDE 30 MG: 30 TABLET, FILM COATED ORAL at 15:09

## 2021-12-12 RX ADMIN — LISINOPRIL 20 MG: 20 TABLET ORAL at 21:44

## 2021-12-12 RX ADMIN — THERA TABS 1 TABLET: TAB at 11:43

## 2021-12-12 RX ADMIN — APIXABAN 5 MG: 5 TABLET, FILM COATED ORAL at 11:44

## 2021-12-12 RX ADMIN — ATORVASTATIN CALCIUM 40 MG: 40 TABLET, FILM COATED ORAL at 21:44

## 2021-12-12 ASSESSMENT — PAIN SCALES - GENERAL
PAINLEVEL_OUTOF10: 0
PAINLEVEL_OUTOF10: 3
PAINLEVEL_OUTOF10: 7
PAINLEVEL_OUTOF10: 0
PAINLEVEL_OUTOF10: 0

## 2021-12-12 NOTE — PROGRESS NOTES
All meds given, MRI checklist completed and sent,  No insulin given FSBG 116. Pt has no complaints of pain. Pt did mention that her left foot tends to \"jerk\" periodically. Will continue to monitor.

## 2021-12-12 NOTE — PLAN OF CARE
Problem: Falls - Risk of:  Goal: Will remain free from falls  Description: Will remain free from falls  Outcome: Ongoing  Note: All fall precautions in place, bed in lowest position, frequent rounding to assist with needs. Pt absent of fall this shift. .       Problem: HEMODYNAMIC STATUS  Goal: Patient has stable vital signs and fluid balance  Outcome: Ongoing  Note: Pt vital signs will be assessed per floor protocol and as needed. Medications will be administered accordingly to keep vitals signs within parameters that are normal or that are chosen by MD.       Problem: ACTIVITY INTOLERANCE/IMPAIRED MOBILITY  Goal: Mobility/activity is maintained at optimum level for patient  Outcome: Ongoing  Note: Pt able to get up to the bathroom with SBA. Problem: COMMUNICATION IMPAIRMENT  Goal: Ability to express needs and understand communication  Outcome: Ongoing  Note: Pt able to communicate her needs and understand what is being said to her.

## 2021-12-12 NOTE — PROGRESS NOTES
Occupational Therapy   Occupational Therapy Initial Assessment/discharge  Date: 2021   Patient Name: Gregory Espana  MRN: [id number]     : 1950    Date of Service: 2021    Discharge Recommendations:  Gregory Espana scored a 19/21 on the AM-PAC ADL Inpatient form. At this time, no further OT is recommended upon discharge due to pt being at baseline. Recommend patient returns to prior setting with prior services. OT Equipment Recommendations  Equipment Needed: No    Assessment   Assessment: eval and d/c. pt close to baseline, still feels weaker on L side as compared to the R side. pt independently completed ADLs this date, performed functional mobility independently this date  Treatment Diagnosis: TIA  Prognosis: Good  Decision Making: Low Complexity  History: pt independent  Patient Education: eval, discharge-pt independent  REQUIRES OT FOLLOW UP: No  Activity Tolerance  Activity Tolerance: Patient Tolerated treatment well  Safety Devices  Safety Devices in place: Yes  Type of devices: All fall risk precautions in place; Nurse notified; Call light within reach; Left in chair; Gait belt (med fall risk, no chair alarm)           Patient Diagnosis(es): The primary encounter diagnosis was TIA (transient ischemic attack). A diagnosis of Acute left-sided weakness was also pertinent to this visit. has a past medical history of Acute CVA (cerebrovascular accident) ([address] Arthritis, CAD (coronary artery disease), Diabetes mellitus ([address] Hyperlipidemia, and Hypertension. has a past surgical history that includes Coronary artery bypass graft (); Gastric bypass surgery; Cardiac surgery (); Dilatation, esophagus; and Cholecystectomy.     Treatment Diagnosis: TIA      Restrictions  Restrictions/Precautions  Restrictions/Precautions: Fall Risk  Required Braces or Orthoses?: No  Position Activity Restriction  Other position/activity restrictions: Gregory Espana is a 73 y.o. female by her Mild kyphosis  Balance  Sitting Balance: Independent  Standing Balance: Independent  Standing Balance  Time: ~10 minutes  Activity: mobility to/from bathroom ~250 ft around unit  Comment: no device, no LOB  Functional Mobility  Functional - Mobility Device: No device  Activity: Other; To/from bathroom  Assist Level: Independent  Functional Mobility Comments: slower pace, improving with time upright  Toilet Transfers  Toilet - Technique: Ambulating  Equipment Used: Standard toilet  Toilet Transfer: Independent  ADL  Grooming: Independent  UE Bathing: Modified independent  (pt seated on toliet for bathing and dressing)  LE Bathing: Modified independent   UE Dressing: Modified independent   LE Dressing: Modified independent   Toileting: Independent (pt voided, performed jimmy care, LB dressing independently)  Additional Comments: pt walked to/from bathroom independently, performed ADLs mod I seated on toliet. left with toothbrush and toothpaste to brush after breakfast  Tone RUE  RUE Tone: Normotonic  Tone LUE  LUE Tone: Normotonic  Coordination  Movements Are Fluid And Coordinated: Yes     Bed mobility  Supine to Sit: Independent  Scooting: Independent  Transfers  Sit to stand: Independent  Stand to sit: Independent     Cognition  Overall Cognitive Status: WNL  Perception  Overall Perceptual Status: WFL     Sensation  Overall Sensation Status: WNL        LUE AROM (degrees)  LUE AROM : WNL  RUE AROM (degrees)  RUE AROM : WNL  LUE Strength  Gross LUE Strength: WNL  RUE Strength  Gross RUE Strength:  WNL                   Plan   Plan  Times per week: eval and d/c             AM-PAC Score        AM-[facility] Inpatient Daily Activity Raw Score: 24 (12/12/21 1049)  AM-PAC Inpatient ADL T-Scale Score : 57.54 (12/12/21 1049)  ADL Inpatient CMS 0-100% Score: 0 (12/12/21 1049)  ADL Inpatient CMS G-Code Modifier : [address] (12/12/21 1049)    Goals  Patient Goals   Patient goals : eval and d/c. pt close to baseline, still feels weaker on L side as compared to the R side       Therapy Time   Individual Concurrent Group Co-treatment   Time In 0836         Time Out 0902         Minutes 26           Timed Code Treatment Minutes:  11 Minutes    Total Treatment Minutes:  26 minutes      Urszula Malone OTR/L [id number]      Urszula Malone OT

## 2021-12-12 NOTE — PROGRESS NOTES
[facility] PROGRESS NOTE    12/12/2021 8:22 AM        Name: Brooke Espino . Admitted: 12/11/2021  Primary Care Provider: Neelam Ibanez (Tel: [phone])                        Subjective:  . No acute events overnight. Resting well. Pain control. Diet ok. Labs reviewed  Denies any chest pain sob. Reviewed interval ancillary notes    Current Medications  dilTIAZem (CARDIZEM) tablet 30 mg, 3 times per day  ferrous sulfate (IRON 325) tablet 325 mg, Daily with breakfast  atorvastatin (LIPITOR) tablet 40 mg, Nightly  metoprolol tartrate (LOPRESSOR) tablet 50 mg, BID  insulin lispro (1 Unit Dial) 0-12 Units, TID WC  insulin lispro (1 Unit Dial) 0-6 Units, Nightly  ondansetron (ZOFRAN-ODT) disintegrating tablet 4 mg, Q8H PRN   Or  ondansetron (ZOFRAN) injection 4 mg, Q6H PRN  polyethylene glycol (GLYCOLAX) packet 17 g, Daily PRN  aspirin EC tablet 81 mg, Daily   Or  aspirin suppository 300 mg, Daily  perflutren lipid microspheres (DEFINITY) injection 1.65 mg, ONCE PRN  labetalol (NORMODYNE;TRANDATE) injection 10 mg, Q10 Min PRN  glucose (GLUTOSE) 40 % oral gel 15 g, PRN  dextrose 50 % IV solution, PRN  glucagon (rDNA) injection 1 mg, PRN  dextrose 5 % solution, PRN        Objective:  BP (!) 162/80   Pulse 64   Temp 97.7 °F (36.5 °C) (Oral)   Resp 16   Ht 5' (1.524 m)   Wt 146 lb 12.8 oz (66.6 kg)   SpO2 95%   BMI 28.67 kg/m²   No intake or output data in the 24 hours ending 12/12/21 0822   Wt Readings from Last 3 Encounters:   12/11/21 146 lb 12.8 oz (66.6 kg)   11/15/21 146 lb 9.6 oz (66.5 kg)   06/26/20 163 lb (73.9 kg)       General appearance:  Appears comfortable  Eyes: Sclera clear. Pupils equal.  ENT: Moist oral mucosa. Trachea midline, no adenopathy, neck supple. Cardiovascular: Regular rhythm, normal S1, S2. No murmur.  No edema in lower extremities  Respiratory: Not using accessory muscles. Good inspiratory effort. Clear to auscultation bilaterally, no wheeze or crackles. GI: Abdomen soft, no tenderness, not distended, normal bowel sounds  Musculoskeletal: No deformity, ROM WNL. Neurology: CN 2-12 grossly intact. No speech or motor deficits  Psych: Normal affect. Alert and oriented in time, place and person  Skin: Warm, dry, normal turgor  Extremities: left lower extremity slightly weaker than right    Labs and Tests:  CBC:   Recent Labs     12/11/21  1115 12/12/21  0636   WBC 9.1 6.1   HGB 12.3 12.5   HCT 36.7 36.7   MCV 91.9 91.2    308     BMP:    Recent Labs     12/11/21  1115   *   K 4.9   CL 95*   CO2 25   BUN 13   CREATININE 0.6   GLUCOSE 119*     Hepatic:   Recent Labs     12/11/21  1115   AST 22   ALT 20   BILITOT <0.2   ALKPHOS 79         Problem List  Active Problems:    Acute CVA (cerebrovascular accident) ([facility]  Resolved Problems:    * No resolved hospital problems. *       Assessment & Plan:   1. CVA: CT of head and CT a of head and neck arteries unremarkable, MRI of brain pending, echocardiogram pending, continue aspirin, will resume  Eliquis, Neurology service consulted. 2. Coronary artery disease status post CABG and 1993, continue aspirin and Lipitor  3. Type 2 diabetes mellitus: Continue sliding scale insulin hemoglobin A1c pending morning blood sugar 94 mg/dL  4. Atrial fibrillation: resume Eliquis for now, rate controlled. Diet: ADULT DIET; Regular; 3 carb choices (45 gm/meal);  Low Sodium (2 gm)  Code:Full Code  DVT PPX lovenox       Hillary Sebastian MD   12/12/2021 8:22 AM

## 2021-12-12 NOTE — PROGRESS NOTES
shopping  Additional Comments: pt denies falls in the last 6 months  Cognition   Cognition  Overall Cognitive Status: WNL    Objective     Observation/Palpation  Posture: Good  Observation: Mild kyphosis    AROM RLE (degrees)  RLE AROM: WNL  AROM LLE (degrees)  LLE AROM : WNL  Strength RLE  Strength RLE: Exception  R Hip Flexion: 4+/5  R Knee Flexion: 4+/5  R Knee Extension: 4+/5  R Ankle Dorsiflexion: 4+/5  R Ankle Plantar flexion: 4+/5  Strength LLE  Strength LLE: Exception  L Hip Flexion: 4+/5  L Knee Flexion: 4+/5  L Knee Extension: 4+/5  L Ankle Dorsiflexion: 4+/5  L Ankle Plantar Flexion: 4+/5  Tone RLE  RLE Tone: Normotonic  Tone LLE  LLE Tone: Normotonic  Motor Control  Gross Motor?: WNL  Coordination  Rapid Alternating Movements: Normal  Heel to Shin: Normal  Sensation  Overall Sensation Status: WNL  Bed mobility  Supine to Sit: Independent  Transfers  Sit to Stand: Independent  Stand to sit: Independent  Ambulation  Ambulation?: Yes  Ambulation 1  Surface: level tile  Device: No Device  Assistance: Supervision  Gait Deviations: Slow [place]ow; Decreased step length  Distance: 250  Comments: Diminished swing leg clearance bilaterally,  gait speed, no LOB noted  Stairs/Curb  Stairs?: No     Balance  Posture: Good  Sitting - Static: Good  Sitting - Dynamic: Good  Standing - Static: Good  Standing - Dynamic: Good    Plan   Safety Devices  Type of devices:  All fall risk precautions in place, Left in chair, Gait belt  Restraints  Initially in place: No    AM-PAC Score  AM-PAC Inpatient Mobility Raw Score : 24 (21)  AM-PAC Inpatient T-Scale Score : 61.14 (21)  Mobility Inpatient CMS 0-100% Score: 0 (21)  Mobility Inpatient CMS G-Code Modifier : CH (21)    Therapy Time   Individual Concurrent Group Co-treatment   Time In 08         Time Out 09         Minutes 26         Timed Code Treatment Minutes: [address]       Mirtha Zamudio, PT

## 2021-12-12 NOTE — PLAN OF CARE
Problem: Falls - Risk of:  Goal: Will remain free from falls  Description: Will remain free from falls  12/12/2021 1438 by Carlene Vargas RN  Outcome: Ongoing  Note: All fall precautions in place, bed in lowest position, frequent rounding to assist with needs. Pt absent of fall this shift. .       Problem: Falls - Risk of:  Goal: Absence of physical injury  Description: Absence of physical injury  Outcome: Ongoing     Problem: HEMODYNAMIC STATUS  Goal: Patient has stable vital signs and fluid balance  12/12/2021 1438 by Carlene Vargas RN  Outcome: Ongoing  Note: Pt vital signs will be assessed per floor protocol and as needed. Medications will be administered accordingly to keep vitals signs within parameters that are normal or that are chosen by MD.       Problem: ACTIVITY INTOLERANCE/IMPAIRED MOBILITY  Goal: Mobility/activity is maintained at optimum level for patient  12/12/2021 1438 by Carlene Vargas RN  Outcome: Ongoing  Note: Pt able to get up to the bathroom with SBA. Problem: COMMUNICATION IMPAIRMENT  Goal: Ability to express needs and understand communication  12/12/2021 1438 by Carlene Vargas RN  Outcome: Ongoing  Note: Pt able to communicate her needs and understand what is being said to her.       Problem: Neurological  Goal: Maximum potential motor/sensory/cognitive function  Outcome: Ongoing     Problem: Pain:  Goal: Pain level will decrease  Description: Pain level will decrease  Outcome: Ongoing     Problem: Pain:  Goal: Control of acute pain  Description: Control of acute pain  Outcome: Ongoing     Problem: Pain:  Goal: Control of chronic pain  Description: Control of chronic pain  Outcome: Ongoing

## 2021-12-12 NOTE — PROGRESS NOTES
Assessment complete. VSS no SOB or any distress noted. NIH 1 no facial palsy noted at this time. Call light within reach, pt encouraged to call if any needs arise. No further requests at this time. Will continue to monitor.

## 2021-12-12 NOTE — PROGRESS NOTES
Facility/Department: [address]  Initial Assessment  DYSPHAGIA BEDSIDE SWALLOW EVALUATION     Patient: Poornima Farfan   : 1950   MRN: [id number]      Evaluation Date: 2021   Admitting Diagnosis: TIA (transient ischemic attack) [G45.9]  Acute left-sided weakness [R53.1]  Acute CVA (cerebrovascular accident) ([address] [I63.9]  Pain: None reported                         H&P: Poornima Farfan is a 73 y.o. female by her self 3 days ago she noted some weakness and numbness of left upper and lower extremity no chest pain shortness breath fever chills nausea vomiting or diaphoresis with this. This morning patient woke up around 7 AM and had some slurred speech and lasted 15 minutes and increased weakness in left upper and lower extremity. That resolved after 15 minutes thus patient came to the ED patient had no chest pain shortness breath fever chills nausea vomiting with this event either. Patient does smoke half pack per day has a history of coronary artery disease with CABG in  and a stent in  is on aspirin for this and on pravastatin. Patient does have diabetes controlled with Metformin has a history of A. fib is on Eliquis no previous CVA events mother did have a stroke i 61    Chest xray 21 :   Impression   No radiographic evidence of acute pulmonary disease. Head CT 21:   Impression   No evidence of acute intracranial process.  Age-appropriate atrophy and   moderate chronic small vessel ischemic changes noted. History/Prior Level of Function:   Living Status: Pt lives independently in private residence. Prior Dysphagia History: Per chart review and pt report, no prior history of dysphagia. Dysphagia Impressions/Diagnosis: Oropharyngeal Dysphagia   Pt upright in bed eating lunch upon SLP arrival. Pt denies hx of or current difficulty with swallowing. Oral motor exam was unremarkable. Various textures assessed to ensure diet tolerance.  Pt presents with a mild oropharyngeal dysphagia characterized by prolonged mastication, suspected premature bolus loss to the pharynx, delayed swallow initiation and minimal oral stasis. Thin liquids via cup revealed suspected premature bolus loss with a delayed swallow initiation and no overt clinical s/s of aspiration/penetration. Prolonged however functional mastication noted with solids. Given extended time and use of liquid wash, pt able to adequately clear oral cavity. One episode of coughing present following trial of regular solids but did not appear related to PO trials. Based on overall tolerance across consistencies, recommend continuation of regular textures with thin liquids, meds as tolerated with use of general swallow precautions. Recommended Diet and Intervention 12/12/2021:  Diet Solids Recommendation:  Regular textures  Liquid Consistency Recommendation: Thin Liquids Recommended form of Meds:   Meds as tolerated     Compensatory Swallowing Strategies: Alternate solids/liquids , Upright as possible with all PO intake , Small bites/sips , Eat/feed slowly, Remain upright 30-45 min     SHORT TERM DYSPHAGIA GOALS/PLAN OF CARE: Speech therapy for dysphagia tx 1-2 times to ensure diet tolerance.   Pt will functionally tolerate recommended diet with no overt clinical s/s of aspiration    Dysphagia Therapeutic Intervention:  Oral Care, Diet Tolerance Monitoring , Patient/Family Education     Discharge Recommendations: Do not anticipate need for further speech/dysphagia therapy upon discharge from hospital     Patient Positioning: Upright in bed    Current Diet Level (prior to evaluation): Regular texture diet with  Thin liquids      Respiratory Status:   [x]Room Air   []O2 via nasal cannula   []Other:    Dentition:  [x]Adequate  []Dentures   []Missing Many Teeth  []Edentulous  []Other:    Baseline Vocal Quality:  [x]Normal  []Dysphonic   []Aphonic   []Hoarse  []Wet  []Weak  []Other:    Volitional Cough:  [x]Strong  []Weak  []Wet  []Absent  []Congested  []Other:    Volitional Swallow:   []Absent   []Delayed     [x]Adequate     []Required use of drink     Oral Mechanism Exam:  [x]WFL []Mild   [] Moderate  []Severe  []To be assessed  Impaired:   []Left side      []Right side    []Labial ROM/Coordination    []Labial Symmetry   []Lingual ROM/Coordination   []Lingual Symmetry  []Gag  []Other:     Oral Phase: []WFL [x]Mild   [] Moderate  []Severe  []To be assessed   [x]Impaired/Prolonged Mastication: mild   []Spillage Left:   []Spillage Right:  []Pocketing Left:   []Pocketing Right:   []Decreased Anterior to Posterior Transit:   [x]Suspected Premature Bolus Loss: thin liquids   [x]Lingual/Palatal Residue: regular solids    []Other:     Pharyngeal Phase: []WFL [x]Mild   [] Moderate  []Severe  []To be assessed   [x]Delayed Swallow: mild   []Suspected Pharyngeal Pooling:   []Decreased Laryngeal Elevation:   []Absent Swallow:  []Wet Vocal Quality:   []Throat Clearing-Immediate:   []Throat Clearing-Delayed:   []Cough-Immediate:   []Cough-Delayed:  []Change in Vital Signs:  []Suspected Delayed Pharyngeal Clearing:  []Other:     Eating Assistance:  [x]Independent  []Setup or clean-up assistance   [] Supervision or touching assistance   [] Partial or moderate assistance   [] Substantial or maximal assistance  [] Dependent       EDUCATION:   Provided education regarding role of SLP, results of assessment, recommendations and general speech pathology plan of care. [x] Pt verbalized understanding and agreement   [] Pt requires ongoing learning   [] No evidence of comprehension     If patient discharges prior to next visit, this note will serve as discharge.      Timed Code Minutes: 0 minutes  Total Treatment Minutes: 12 minutes    Electronically signed by:    Michele Coats M.A., [address] [place], [place] [id number]  Speech-Language Pathologist  12/12/2021 12:31 PM

## 2021-12-13 ENCOUNTER — APPOINTMENT (OUTPATIENT)
Dept: MRI IMAGING | Age: 71
DRG: 065 | End: 2021-12-13
Payer: MEDICARE

## 2021-12-13 ENCOUNTER — HOSPITAL ENCOUNTER (OUTPATIENT)
Dept: CARDIAC CATH/INVASIVE PROCEDURES | Age: 71
Discharge: HOME | End: 2021-12-13

## 2021-12-13 VITALS
WEIGHT: 146.8 LBS | RESPIRATION RATE: 16 BRPM | TEMPERATURE: 98 F | BODY MASS INDEX: 28.82 KG/M2 | HEIGHT: 60 IN | HEART RATE: 130 BPM | OXYGEN SATURATION: 94 % | DIASTOLIC BLOOD PRESSURE: 87 MMHG | SYSTOLIC BLOOD PRESSURE: 141 MMHG

## 2021-12-13 LAB
GLUCOSE BLD-MCNC: 107 MG/DL (ref 70–99)
GLUCOSE BLD-MCNC: 139 MG/DL (ref 70–99)
GLUCOSE BLD-MCNC: 156 MG/DL (ref 70–99)
LV EF: 58 %
LVEF MODALITY: NORMAL
PERFORMED ON: ABNORMAL

## 2021-12-13 PROCEDURE — 6370000000 HC RX 637 (ALT 250 FOR IP): Performed by: INTERNAL MEDICINE

## 2021-12-13 PROCEDURE — 93306 TTE W/DOPPLER COMPLETE: CPT

## 2021-12-13 PROCEDURE — 6370000000 HC RX 637 (ALT 250 FOR IP): Performed by: HOSPITALIST

## 2021-12-13 PROCEDURE — 92526 ORAL FUNCTION THERAPY: CPT

## 2021-12-13 PROCEDURE — 70551 MRI BRAIN STEM W/O DYE: CPT

## 2021-12-13 PROCEDURE — 99223 1ST HOSP IP/OBS HIGH 75: CPT | Performed by: PSYCHIATRY & NEUROLOGY

## 2021-12-13 PROCEDURE — 6360000002 HC RX W HCPCS: Performed by: HOSPITALIST

## 2021-12-13 RX ORDER — SODIUM CHLORIDE 0.9 % (FLUSH) 0.9 %
5-40 SYRINGE (ML) INJECTION PRN
Status: DISCONTINUED | OUTPATIENT
Start: 2021-12-13 | End: 2021-12-16 | Stop reason: HOSPADM

## 2021-12-13 RX ORDER — LORAZEPAM 2 MG/ML
0.5 INJECTION INTRAMUSCULAR ONCE
Status: COMPLETED | OUTPATIENT
Start: 2021-12-13 | End: 2021-12-13

## 2021-12-13 RX ADMIN — DILTIAZEM HYDROCHLORIDE 30 MG: 30 TABLET, FILM COATED ORAL at 16:20

## 2021-12-13 RX ADMIN — ASPIRIN 81 MG: 81 TABLET, COATED ORAL at 07:25

## 2021-12-13 RX ADMIN — Medication 500 MCG: at 07:26

## 2021-12-13 RX ADMIN — Medication 400 MG: at 07:25

## 2021-12-13 RX ADMIN — FERROUS SULFATE TAB 325 MG (65 MG ELEMENTAL FE) 325 MG: 325 (65 FE) TAB at 07:23

## 2021-12-13 RX ADMIN — METOPROLOL TARTRATE 50 MG: 50 TABLET, FILM COATED ORAL at 07:25

## 2021-12-13 RX ADMIN — APIXABAN 5 MG: 5 TABLET, FILM COATED ORAL at 07:25

## 2021-12-13 RX ADMIN — FAMOTIDINE 20 MG: 20 TABLET, FILM COATED ORAL at 07:25

## 2021-12-13 RX ADMIN — DILTIAZEM HYDROCHLORIDE 30 MG: 30 TABLET, FILM COATED ORAL at 07:22

## 2021-12-13 RX ADMIN — THERA TABS 1 TABLET: TAB at 11:53

## 2021-12-13 RX ADMIN — LISINOPRIL 20 MG: 20 TABLET ORAL at 07:26

## 2021-12-13 RX ADMIN — LORAZEPAM 0.5 MG: 2 INJECTION INTRAMUSCULAR; INTRAVENOUS at 11:53

## 2021-12-13 ASSESSMENT — PAIN SCALES - GENERAL: PAINLEVEL_OUTOF10: 0

## 2021-12-13 NOTE — CONSULTS
In patient Neurology consult        [facility]      MD America Rosario  1950    Date of Service: 12/13/2021    Referring Physician: Justin Schaefer MD      Reason for the consult and CC: New onset left-sided numbness and possible stroke    HPI:   The patient is a 73y.o.  years old female with history of hypertension, CAD and A. fib on Eliquis who was admitted to the hospital over the weekend with a new onset left-sided paresthesia. Symptoms started 2 to 3 days ago. Description was sudden left arm and leg numbness and tingling with weakness. Degree was severe. Duration was half hour. No triggers or other associated symptoms. No headache or chest pain. Patient came to the ED where she was evaluated. Initial work-up with CT head showed no large vessel occlusion. She was admitted to the hospital  Today she denies any new symptoms. She feels slightly weaker on the left side. Other review of system was unremarkable.       Family History   Problem Relation Age of Onset    Cancer Mother     Diabetes Father     Heart Disease Father     Cancer Father     Cancer Maternal Aunt      Past Surgical History:   Procedure Laterality Date    CARDIAC SURGERY  2001    stent    CHOLECYSTECTOMY      in 2007    CORONARY ARTERY BYPASS GRAFT  1993    DILATATION, ESOPHAGUS      gastric bypass    GASTRIC BYPASS SURGERY          Past Medical History:   Diagnosis Date    Acute CVA (cerebrovascular accident) ([address] 12/11/2021    Arthritis     CAD (coronary artery disease)     Diabetes mellitus [address] [address]     Hyperlipidemia     Hypertension      Social History     Tobacco Use    Smoking status: Current Every Day Smoker     Packs/day: 1.00     Years: 30.00     Pack years: 30.00     Types: Cigarettes    Smokeless tobacco: Never Used    Tobacco comment: started smoking again   Vaping Use    Vaping Use: Not on file   Substance Use Topics    Alcohol use: No     Alcohol/week: 0.0 standard drinks  Drug use: No     Allergies   Allergen Reactions    Codeine Rash    Demerol Rash     Current Facility-Administered Medications   Medication Dose Route Frequency Provider Last Rate Last Admin    acetaminophen (TYLENOL) tablet 650 mg  650 mg Oral Q4H PRN Hillary Sebastian MD   650 mg at 12/12/21 1144    magnesium oxide (MAG-OX) tablet 400 mg  400 mg Oral Daily Hillary Sebastian MD   400 mg at 12/13/21 0725    apixaban (ELIQUIS) tablet 5 mg  5 mg Oral BID Hillary Sebastian MD   5 mg at 12/13/21 0725    lisinopril (PRINIVIL;ZESTRIL) tablet 20 mg  20 mg Oral BID Hillary Sebastian MD   20 mg at 12/13/21 0726    multivitamin 1 tablet  1 tablet Oral Lunch Hillary Sebastian MD   1 tablet at 12/13/21 1153    famotidine (PEPCID) tablet 20 mg  20 mg Oral BID Hillary Sebastian MD   20 mg at 12/13/21 0725    vitamin B-12 (CYANOCOBALAMIN) tablet 500 mcg  500 mcg Oral Daily Hillary Sebastian MD   500 mcg at 12/13/21 0726    dilTIAZem (CARDIZEM) tablet 30 mg  30 mg Oral 3 times per day David Best MD   30 mg at 12/13/21 3106    ferrous sulfate (IRON 325) tablet 325 mg  325 mg Oral Daily with breakfast David Best MD   325 mg at 12/13/21 0723    atorvastatin (LIPITOR) tablet 40 mg  40 mg Oral Nightly David Best MD   40 mg at 12/12/21 2144    metoprolol tartrate (LOPRESSOR) tablet 50 mg  50 mg Oral BID David Best MD   50 mg at 12/13/21 0725    insulin lispro (1 Unit Dial) 0-12 Units  0-12 Units SubCUTAneous TID WC David Best MD        insulin lispro (1 Unit Dial) 0-6 Units  0-6 Units SubCUTAneous Nightly David Best MD        ondansetron (ZOFRAN-ODT) disintegrating tablet 4 mg  4 mg Oral Q8H PRN Araseli Gilliam MD        Or    ondansetron (ZOFRAN) injection 4 mg  4 mg IntraVENous Q6H PRN David Best MD        polyethylene glycol (GLYCOLAX) packet 17 g  17 g Oral Daily PRN David Best MD        aspirin EC tablet 81 mg  81 mg Oral Daily David Best MD   81 mg at 12/13/21 4782 Or    aspirin suppository 300 mg  300 mg Rectal Daily Araseli Zimmerman MD        perflutren lipid microspheres (DEFINITY) injection 1.65 mg  1.5 mL IntraVENous ONCE PRN Kelvin Bass MD        labetalol (NORMODYNE;TRANDATE) injection 10 mg  10 mg IntraVENous Q10 Min PRN Kelvin Bass MD        glucose (GLUTOSE) 40 % oral gel 15 g  15 g Oral PRN Kelvin Bass MD        dextrose 50 % IV solution  12.5 g IntraVENous PRN Kelvin Bass MD        glucagon (rDNA) injection 1 mg  1 mg IntraMUSCular PRN Kelvin Bass MD        dextrose 5 % solution  100 mL/hr IntraVENous PRN Kelvin Bass MD         Facility-Administered Medications Ordered in Other Encounters   Medication Dose Route Frequency Provider Last Rate Last Admin    sodium chloride flush 0.9 % injection 5-40 mL  5-40 mL IntraVENous PRN Amparo Gonzalez MD           ROS : A 10-14 system review of constitutional, cardiovascular, respiratory, eyes, musculoskeletal, endocrine, GI, ENT, skin, hematological, genitourinary, psychiatric and neurologic systems was obtained and updated today and is unremarkable except as mentioned in my HPI      Exam:     Constitutional:   Vitals:    12/12/21 2355 12/13/21 0336 12/13/21 0722 12/13/21 1145   BP: (!) 156/91 (!) 150/71 (!) 168/82 (!) 151/83   Pulse: 62 69 72 74   Resp: 16 16 16 16   Temp: 98.2 °F (36.8 °C) 98.1 °F (36.7 °C) 98 °F (36.7 °C) 97.9 °F (36.6 °C)   TempSrc: Oral Oral  Oral   SpO2: 95% 94% 93% 94%   Weight:       Height:           General appearance:  Normal development and appear in no acute distress. Eye:  Fundus of the eye: Funduscopic examination cannot be performed due to COVID19 restrictions and precautions. Neck: supple  Cardiovascular: No lower leg edema with good pulsation. Mental Status:   Oriented to person, place, problem, and time. Memory: Good immediate recall. Intact remote memory  Normal attention span and concentration.   Language: intact naming, repeating and fluency   Good fund of Knowledge. Aware of current events and vocabulary   Cranial Nerves:   II: Visual fields: Full. Pupils: equal, round, reactive to light  III,IV,VI: Extra Ocular Movements are intact. No nystagmus  V: Facial sensation is intact   VII: Facial strength and movements: intact and symmetric  VIII: Hearing: Intact  IX: Palate elevation is symmetric  XI: Shoulder shrug is intact  XII: Tongue movements are normal  Musculoskeletal: 5/5 in right side. Left-sided weakness 4/5  Tone: Normal tone. Reflexes: 2+ in the upper extremity and 2+ in the lower extremity   Planters: flexor bilaterally. Coordination: no pronator drift, no dysmetria with FNF in upper extremities. Normal REM on the right and poor in the left. Sensation: normal to all modalities in both arms and legs. Gait/Posture: steady gait    Data:  LABS:   Lab Results   Component Value Date     12/11/2021    K 4.9 12/11/2021    CL 95 12/11/2021    CO2 25 12/11/2021    BUN 13 12/11/2021    CREATININE 0.6 12/11/2021    GFRAA >60 12/11/2021    LABGLOM >60 12/11/2021    GLUCOSE 119 12/11/2021    GLUCOSE 92 11/02/2011    GLUCOSE 134.1 11/02/2011    PHOS 3.2 03/31/2019    MG 1.50 03/31/2019    CALCIUM 9.4 12/11/2021     Lab Results   Component Value Date    WBC 6.1 12/12/2021    RBC 4.03 12/12/2021    HGB 12.5 12/12/2021    HCT 36.7 12/12/2021    MCV 91.2 12/12/2021    RDW 13.0 12/12/2021     12/12/2021     Lab Results   Component Value Date    INR 1.02 12/11/2021    PROTIME 11.5 12/11/2021       Neuroimaging were independently reviewed by me and discussed results with the patient  Reviewed notes from different physicians  Reviewed lab and blood testing    Impression:  Acute left-sided weakness. Likely secondary new ischemic right hemispheric stroke.   Thromboembolic versus cardioembolic  History of A. fib on Eliquis  Hypertension, not controlled  Hyperlipidemia  Diabetes, controlled    Recommendation:  Agree with MRI brain  Echo  Eliquis  PT OT  Telemetry  insulin sliding scale  Blood sugar control  Continue home blood pressure medications  Stroke education was provided today including risk of recurrence  Discussed risk of bleeding on blood thinners  Can be discharged from neurology once medically stable        Thank you for referring such patient. If you have any questions regarding my consult note, please don't hesitate to call me. Jose Elias Prather MD  [phone]    This dictation was generated by voice recognition computer software.  Although all attempts are made to edit the dictation for accuracy, there may be errors in the  transcription that are not intended

## 2021-12-13 NOTE — PLAN OF CARE
Problem: Falls - Risk of:  Goal: Will remain free from falls  Description: Will remain free from falls  12/13/2021 0253 by Miriam Merida RN  Outcome: Ongoing  Note: Fall risk assessment completed. Pt considered a medium fall risk. Pt alert and oriented x4, steady gait, uses call light appropriately, VU to call if in need of assistance. Will continue to monitor. Problem: Falls - Risk of:  Goal: Absence of physical injury  Description: Absence of physical injury  12/13/2021 0253 by Miriam Merida RN  Outcome: Ongoing  Note: Fall risk assessment completed. Pt gait steady. Clear pathway provided to and from bathroom as pt is ambulatory. Bed kept in lowest position with wheels locked. Call light within reach. Pt encouraged to call for any needs. Pt free from accidental injury this shift. Problem: HEMODYNAMIC STATUS  Goal: Patient has stable vital signs and fluid balance  12/13/2021 0253 by Miriam Merida RN  Outcome: Ongoing  Note: Pt vital signs will be assessed per floor protocol and as needed. Medications will be administered accordingly to keep vitals signs within parameters that are normal or that are chosen by MD.       Problem: ACTIVITY INTOLERANCE/IMPAIRED MOBILITY  Goal: Mobility/activity is maintained at optimum level for patient  12/13/2021 0253 by Miriam Merida RN  Outcome: Ongoing  Note: Pt is ambulatory and independent. Problem: COMMUNICATION IMPAIRMENT  Goal: Ability to express needs and understand communication  12/13/2021 0253 by Miriam Merida RN  Outcome: Ongoing  Note: Pt able to communicate clearly, no slurred speech,dysarthria or dysphasia noted. Problem: Neurological  Goal: Maximum potential motor/sensory/cognitive function  12/13/2021 0253 by Miriam Merida RN  Outcome: Ongoing     Problem: Pain:  Goal: Pain level will decrease  Description: Pain level will decrease  12/13/2021 0253 by Miriam Merida RN  Outcome: Ongoing  Note: No complaints of pain.

## 2021-12-13 NOTE — CARE COORDINATION
Discharge Note:     Chart reviewed and it appears that patient has minimal needs for discharge at this time. Discussed with patients nurse and requested that case management be notified if discharge needs are identified.        Byron Shabazz MSN, BSN, RN  Case Management   [phone]

## 2021-12-13 NOTE — PROGRESS NOTES
Speech Language Pathology  Dysphagia Treatment Note    Name:  Jacob Raymundo  :   1950  Medical Diagnosis:  TIA (transient ischemic attack) [G45.9]  Acute left-sided weakness [R53.1]  Acute CVA (cerebrovascular accident) ([facility] [address] [I63.9]  Treatment Diagnosis: Oropharyngeal Dysphagia  Pain level: Declined     Diet level prior to treatment: Regular textures with Thin Liquids, meds as tolerated  Tolerance of Current Diet Level: Per chart review and pt report, no noted difficulty with current diet. Assessment of Texture Tolerance:  -Impressions: Pt alert and upright in bed upon SLP arrival. Pt reported symptoms have completely resolved this date. Pt accepting of thin liquids via cup and regular solids to assess texture tolerance. Pt overall tolerating of thin liquids via cup with a timely swallow initiation. Functional mastication and adequate oral clearance noted with regular solids. No overt clinical s/s of aspiration/penetration assessed across consistencies. Based on overall tolerating with all textures, recommend continuation of regular textures and thin liquids with meds as tolerated. Pt swallow function appears functional at this time, further SLP services to target dysphagia not indicated at this time. Diet and Treatment Recommendations 2021:  Recommend continuation of regular textures and thin liquids, meds as tolerated     Compensatory strategies: Upright as possible with all PO intake     Dysphagia Goals:   1. Pt will functionally tolerate recommended diet with no overt clinical s/s of aspiration GOAL MET    Plan: Further SLP services to target dysphagia not indicated at this time. Patient/Family Education:Education given to the Pt and nurse, who verbalized understanding    Discharge Recommendations: Continued SLP services to target dysphagia not indicated at discharge.      Timed Code Treatment: 0 minutes    Total Treatment Time: 8 minutes    If patient discharges prior to next session this note will serve as a discharge summary.      Signature:   Gayla Christian., [address] [facility] [address]  Speech-Language Pathologist  12/13/2021 2:52 PM

## 2021-12-13 NOTE — PROGRESS NOTES
Assessment complete. VSS no SOB or any distress noted. NIH 0. Pt states that she no longer has numbness and tingling in her extremities. Pt up to the bath room. POC discussed and agreed upon. Call light within reach, pt encouraged to call if any needs arise. No further requests at this time. Will continue to monitor.

## 2021-12-14 NOTE — PROGRESS NOTES
Pt out of room for 3pm diltiazem, upon arrival back to floor pt /170. diltiazem given and MD made aware.

## 2021-12-14 NOTE — PROGRESS NOTES
Data- discharge order received, pt verbalized agreement to discharge, disposition to previous residence, no needs for HHC/DME. Action- discharge instructions prepared and given to patient, pt verbalized understanding. Medication information packet given r/t NEW and/or CHANGED prescriptions emphasizing name/purpose/side effects, pt verbalized understanding. Discharge instruction summary: Diet- general, Activity- as tolerated, Primary Care Physician as follows: Karen  [phone] f/u appointment in 1 week , immun    1. WEIGHT: Admit Weight: 149 lb (67.6 kg) (12/11/21 1109)        Today  Weight: 146 lb 12.8 oz (66.6 kg) (12/11/21 1711)       2. O2 SAT.: SpO2: 94 % (12/13/21 1145)    Response- Pt belongings gathered, IV removed. Disposition is home (no HHC/DME needs), transported with belongings, taken to lobby via w/c w/ staff, no complications.

## 2021-12-19 NOTE — DISCHARGE SUMMARY
[facility] DISCHARGE SUMMARY    Patient Demographics    Patient. Deidre Doan  Date of Birth. 1950  MRN. [id number]     Primary care provider. Harpreetmaggie Chaya  (Tel: [phone])    Admit date: 12/11/2021    Discharge date (blank if same as Note Date): 12/13/2021  Note Date: 12/19/2021     Reason for Hospitalization. Chief Complaint   Patient presents with    Cerebrovascular Accident     pt brought in by squad from home with c/o left sided numbness tingling and weakness x 3 days. c/o slurring her words this morning. [facility]. Acute stroke  -With left-sided weakness tingling for 3 days. With slurring of her words.  -Symptoms improved significantly. MRI echo negative. Patient started on aspirin statin. Plavix  -Discharge stable with improvement in symptoms  Consults. IP CONSULT TO PHARMACY  PHARMACY TO CHANGE BASE FLUIDS  IP CONSULT TO NEUROLOGY    Physical examination on discharge day. BP (!) 141/87   Pulse 130   Temp 98 °F (36.7 °C) (Oral)   Resp 16   Ht 5' (1.524 m)   Wt 146 lb 12.8 oz (66.6 kg)   SpO2 94%   BMI 28.67 kg/m²   General appearance. Alert. Looks comfortable. HEENT. Sclera clear. Moist mucus membranes. Cardiovascular. Regular rate and rhythm, normal S1, S2. No murmur. Respiratory. Not using accessory muscles. Clear to auscultation bilaterally, no wheeze. Gastrointestinal. Abdomen soft, non-tender, not distended, normal bowel sounds  Neurology. Facial symmetry. No speech deficits. Moving all extremities equally. Extremities. No edema in lower extremities. Skin. Warm, dry, normal turgor    Condition at time of discharge stable     Medication instructions provided to patient at discharge.      Medication List      CONTINUE taking these medications    apixaban 5 MG Tabs tablet  Commonly known as: ELIQUIS  Take 1 tablet by mouth 2 times daily  Notes to patient: Use: to prevent blood clots and stroke   Side Effects: bleeding, bruising     aspirin 81 MG tablet  Take 1 tablet by mouth daily. Notes to patient: Aspirin  Use: Prevents heart attacks & strokes. Side effects: bleeding or bruising more easily, upset stomach. calcium carbonate 500 MG Tabs tablet  Commonly known as: OSCAL     dilTIAZem 30 MG tablet  Commonly known as: CARDIZEM  Take 1 tablet by mouth every 8 hours  Notes to patient: Diltiazem (Cardizem ®)  Use: lower heart rate. Side effects: dizziness, headache, and constipation.      famotidine 20 MG tablet  Commonly known as: PEPCID  Take 1 tablet by mouth 2 times daily  Notes to patient: Famotidine (Pepcid*)  Use: reduces stomach acid, protect the digestive tract  Side effects: rare but can cause dizziness, headache or diarrhea     ferrous sulfate 325 (65 Fe) MG tablet  Commonly known as: IRON 325     ibandronate 150 MG tablet  Commonly known as: BONIVA     lisinopril 20 MG tablet  Commonly known as: PRINIVIL;ZESTRIL  Take 1 tablet by mouth 2 times daily Indications: will start after today's visit  Notes to patient: Lisinopril (Zestril*, Prinivil)  Use: Lower blood pressure,  prevent heart failure  Side effects: Cough, headache, diarrhea, and dizziness, call nurse right away if lips or throat begin to swell     lovastatin 20 MG tablet  Commonly known as: MEVACOR     magnesium oxide 400 MG tablet  Commonly known as: MAG-OX  Take 1 tablet by mouth daily     metFORMIN 850 MG tablet  Commonly known as: GLUCOPHAGE  Notes to patient: Metformin (Glucophage*)  Use: treats diabetes or high blood sugar  Side effects: upset stomach, low blood sugar     metoprolol tartrate 50 MG tablet  Commonly known as: LOPRESSOR  Take 1 tablet by mouth 2 times daily  Notes to patient: Metoprolol (Lopressor, Toprol XL)  Use: treat heart failure, prevent future heart attacks, lower blood pressure and heart rate, treat chest pain   Side effects: dizziness, fatigue, diarrhea, and depression     MULTIVITAMIN PO     vitamin B-12 1000 MCG tablet  Commonly known as: CYANOCOBALAMIN            Discharge recommendations given to patient. Follow Up. pcp in 1 week   Disposition. home  Activity. activity as tolerated  Diet: No diet orders on file      Spent 45  minutes in discharge process.     Signed:  Lesly Cope MD     12/19/2021 4:01 PM

## 2021-12-20 NOTE — ED PROVIDER NOTES
As physician-in-triage, I performed a medical screening history and physical exam on [address] [place] [address]. I also cared for and evaluated the patient in conjunction with the ED Advanced Practice Provider. All diagnostic, treatment, and disposition decisions were made by myself in conjunction with the advanced practice provider. For all further details of the patient's emergency department visit, please see the advanced practice provider's documentation. To the ER for evaluation of a 3-day history of left arm and leg weakness and paresthesia, with mild dysarthria.,  CTA and CT Noncon revealed no evidence of acute large thrombosis or obvious in car infarct, NIH stroke scale total was 4, patient will be admitted for MRI imaging and echocardiogram, blood pressure management, antiplatelet agents continued.   She is not a candidate for thrombolysis due to time interval.    Impression: Dysarthria, weakness left arm left leg, TIA     Mikki Cohen MD  44/34/11 [id number]

## 2022-01-17 ENCOUNTER — TELEPHONE (OUTPATIENT)
Dept: CARDIOLOGY CLINIC | Age: 72
End: 2022-01-17

## 2022-01-17 NOTE — TELEPHONE ENCOUNTER
She was to be scheduled for loop recorder not cardioversion.   Can we print her letter and resend please     Haydee Garay, APRN-CNP

## 2022-01-19 RX ORDER — SODIUM CHLORIDE 0.9 % (FLUSH) 0.9 %
5-40 SYRINGE (ML) INJECTION PRN
Status: CANCELLED | OUTPATIENT
Start: 2022-01-19

## 2022-02-03 ENCOUNTER — HOSPITAL ENCOUNTER (OUTPATIENT)
Dept: CARDIAC CATH/INVASIVE PROCEDURES | Age: 72
Discharge: HOME | End: 2022-02-03

## 2022-02-08 ENCOUNTER — PRE-PROCEDURE TELEPHONE (OUTPATIENT)
Dept: CARDIAC CATH/INVASIVE PROCEDURES | Age: 72
End: 2022-02-08

## 2022-02-08 NOTE — PROGRESS NOTES
Called PT to reschedule the loop implant that she cancelled last week due to weather. Left a voicemail asking for a return call.

## 2022-02-10 ENCOUNTER — PRE-PROCEDURE TELEPHONE (OUTPATIENT)
Dept: CARDIAC CATH/INVASIVE PROCEDURES | Age: 72
End: 2022-02-10

## 2022-02-10 NOTE — PROGRESS NOTES
Spoke with patient today to reschedule her Loop Implant that she cancelled last week. PT stated she needed to call us back with available days her ride can get her to the procedure. We will schedule the appt once she calls back.

## 2022-02-17 ENCOUNTER — HOSPITAL ENCOUNTER (OUTPATIENT)
Dept: CARDIAC CATH/INVASIVE PROCEDURES | Age: 72
Discharge: HOME | End: 2022-02-17
Attending: INTERNAL MEDICINE | Admitting: INTERNAL MEDICINE
Payer: MEDICARE

## 2022-02-17 VITALS
RESPIRATION RATE: 18 BRPM | SYSTOLIC BLOOD PRESSURE: 156 MMHG | DIASTOLIC BLOOD PRESSURE: 75 MMHG | HEART RATE: 75 BPM | OXYGEN SATURATION: 94 % | HEIGHT: 60 IN | BODY MASS INDEX: 29.64 KG/M2 | WEIGHT: 151 LBS

## 2022-02-17 PROCEDURE — C1764 EVENT RECORDER, CARDIAC: HCPCS

## 2022-02-17 PROCEDURE — 33285 INSJ SUBQ CAR RHYTHM MNTR: CPT | Performed by: INTERNAL MEDICINE

## 2022-02-17 PROCEDURE — 33285 INSJ SUBQ CAR RHYTHM MNTR: CPT

## 2022-02-17 RX ORDER — SODIUM CHLORIDE 0.9 % (FLUSH) 0.9 %
5-40 SYRINGE (ML) INJECTION PRN
Status: DISCONTINUED | OUTPATIENT
Start: 2022-02-17 | End: 2022-02-17 | Stop reason: HOSPADM

## 2022-02-17 NOTE — H&P
Cardiac Electrophysiology   Date: 2/17/2022         Chief Complaint:   No chief complaint on file. HPI: Tyrone North is a 73 y.o. patient with a history of 81 y.o. female with a past medical history of CAD s/p CABG (1993), DM, HTN, atrial fibrillation, and HLD. Hx of gastric bypass with extensive weight loss (170lbs). She was admitted 03/2019 with atrial flutter with RVR. Her magnesium was noted to be 1.5 . She does have a history of hypomagnesemia. Presented to ED at [place] [address] 10/05/2021 with palpitations and \" racing heart\" . Symptoms abated by the time she arrived tot he ER. EKG showed afib rate 94 . Magnesium was noted to low at 1.4 . Instructed to take additional dose of magnesium for 10 days. Interval History: Today, Juany Cortez is here to follow up regarding her atrial fibrillation. She has not had a problem with her atrial fibrillation since her CABG until the last couple of years. Prior to September her most recent episodes was three -four years ago. She is a current smoker. Smokes 15 cigarettes a day. She feels like she cant breathe when she is in atrial fibrillation     Past Medical History:   Diagnosis Date    Acute CVA (cerebrovascular accident) ([facility] [address] 12/11/2021    Arthritis     CAD (coronary artery disease)     Diabetes mellitus ([facility] [address]     Hyperlipidemia     Hypertension       Past Surgical History:   Procedure Laterality Date    CARDIAC SURGERY  2001    stent    CHOLECYSTECTOMY      in 2007    CORONARY ARTERY BYPASS GRAFT  1993    DILATATION, ESOPHAGUS      gastric bypass    GASTRIC BYPASS SURGERY         Allergies: Allergies   Allergen Reactions    Codeine Rash    Demerol Rash       Medication:   Prior to Admission medications    Medication Sig Start Date End Date Taking?  Authorizing Provider   ibandronate (BONIVA) 150 MG tablet Take 150 mg by mouth  Patient not taking: Reported on 11/15/2021 9/13/21 9/13/22  Historical Provider, MD   lisinopril (PRINIVIL;ZESTRIL) 20 MG tablet Take 1 tablet by mouth 2 times daily Indications: will start after today's visit 6/26/20   Scarlett Yates, APRN - CNP   apixaban (ELIQUIS) 5 MG TABS tablet Take 1 tablet by mouth 2 times daily 3/31/19   Bard Rudy MD   metoprolol tartrate (LOPRESSOR) 50 MG tablet Take 1 tablet by mouth 2 times daily 3/31/19   Bard Rudy MD   diltiazem (CARDIZEM) 30 MG tablet Take 1 tablet by mouth every 8 hours 3/31/19   Bard Rudy MD   famotidine (PEPCID) 20 MG tablet Take 1 tablet by mouth 2 times daily 3/31/19   Bard Rudy MD   magnesium oxide (MAG-OX) 400 MG tablet Take 1 tablet by mouth daily 3/31/19   Bard Rudy MD   ferrous sulfate 325 (65 FE) MG tablet Take 325 mg by mouth daily (with breakfast). Historical Provider, MD   aspirin 81 MG tablet Take 1 tablet by mouth daily. 6/26/13   Akila Blanco MD   calcium carbonate (OSCAL) 500 MG TABS tablet Take 600 mg by mouth 2 times daily. Historical Provider, MD   lovastatin (MEVACOR) 20 MG tablet Take 20 mg by mouth nightly. Historical Provider, MD   metformin (GLUCOPHAGE) 850 MG tablet Take 850 mg by mouth 2 times daily (with meals). Historical Provider, MD   Multiple Vitamin (MULTIVITAMIN PO) Take 1 tablet by mouth daily. Historical Provider, MD   vitamin B-12 (CYANOCOBALAMIN) 1000 MCG tablet Take 500 mcg by mouth daily. Historical Provider, MD       Social History:   reports that she has been smoking cigarettes. She has a 30.00 pack-year smoking history. She has never used smokeless tobacco. She reports that she does not drink alcohol and does not use drugs. Family History:  family history includes Cancer in her father, maternal aunt, and mother; Diabetes in her father; Heart Disease in her father. Reviewed. Denies family history of sudden cardiac death, arrhythmia, premature CAD    Review of System:  Pertinent positive and negatives are in the HPI, the rest are negative.      Physical Examination:  BP (!) 156/75   Pulse 75   Resp 18   Ht 5' (1.524 m)   Wt 151 lb (68.5 kg)   SpO2 94%   BMI 29.49 kg/m²      · Constitutional: Oriented. No distress. · Head: Normocephalic and atraumatic. · Mouth/Throat: Oropharynx is clear and moist.   · Eyes: Conjunctivae normal. EOM are normal.   · Neck: Normal range of motion. Neck supple. No rigidity. No JVD present. · Cardiovascular: Normal rate, regular rhythm, S1&S2 and intact distal pulses. · Pulmonary/Chest: Bilateral respiratory sounds. No wheezes. No rhonchi. · Abdominal: Soft. Bowel sounds present. No distension, No tenderness. · Musculoskeletal: No tenderness. No edema    · Lymphadenopathy: Has no cervical adenopathy. · Neurological: Alert and oriented. Cranial nerve appears intact, No Gross deficit   · Skin: Skin is warm and dry. No rash noted. · Psychiatric: Has a normal mood, affect and behavior     Labs:  Reviewed. ECG: reviewed, Sinus  rhythm with v-rate of  66 bpm with QRS duration 74  ms. No ventricular pre-excitation, or QT prolongation. Studies:   1. Event monitor:       2. Echo: 3/19   -Normal left ventricle size, wall thickness, and systolic function with an   estimated ejection fraction of 55%.  -Indeterminate diastolic function.   -Mitral annular calcification is present. Mild mitral regurgitation.   -The left atrium is mildly dilated.   -The aortic valve appears sclerotic but opens well.   -Mild tricuspid regurgitation with PASP of 40 mmHg.   -Mild pulmonic regurgitation present      3. Stress Test:    GXT: 4/19   There is normal isotope uptake at stress and rest. There is no evidence of   myocardial ischemia or scar.   Normal LV function.   Hypertension   Overall findings represent a low risk scan. 4. Cath:   2014  Patent LIMA to LAD, occluded Cx with small collaterals  I independently reviewed the ECG, MCOT, echocardiogram, stress test, and coronary angiography/PCI results and used them for my plan of care. Procedures:  1. [id number] Score for Atrial Fibrillation Stroke Risk   Risk   Factors  Component Value   C CHF No 0   H HTN Yes 1   A2 Age >= 76 No,  (77 y.o.) 0   D DM Yes 1   S2 Prior Stroke/TIA Yes 2   V Vascular Disease No 1   A Age 74-69 Yes,  (77 y.o.) 1   Sc Sex female 1    [id number]  Score  5   Score last updated 11/15/21 4:05 AM EST    Click here for a link to the UpToDate guideline \"Atrial Fibrillation: Anticoagulation therapy to prevent embolization    Disclaimer: Risk Score calculation is dependent on accuracy of patient problem list and past encounter diagnosis. Assessment/Plan:   1. Paroxysmal Atrial Fibrillation/flutter   - EKG today Sinus rhythn    - Continue lopressor 50 mg BID, cardizem 30 mg TID     - [id number] score: 5 (Age, Gender, DM, HTN, CAD) ; [id number] Vasc score and anticoagulation discussed. High risk for stroke and thromboembolism. Anticoagulation is recommended. Risk of bleeding was discussed   ~ On Eliquis 5 mg BID; tolerating well     ~ creatinine 0.7 10/05/2021      - Afib risk factors including age, HTN, obesity, inactivity and BRIAN were discussed with patient. Risk factor modification recommended              ~ TSH 1.53 03/30/2019      - discussed physiology of atrial fibrillation   - Treatment options including cardioversion, rate control strategy, antiarrhythmics, anticoagulation and possible ablation were discussed with patient. Risks, benefits and alternative of each treatment options were explained. All questions answered    With the above discussion, for now we have agreed to monitor afib recurrence and burden using a loop recorder. If she has increasing burden or longer durations, then would proceed with either antiarrhythmics or ablation. She will continue taking anticoagulation for stroke risk reduction. 2.HTN  - Not well controlled 170/80 - she states she checks her blood pressure every day and it is not high.    - BP goal <130/80  - Home BP monitoring encouraged, printed information provided on how to accurately measure BP at home.  -Counseled to follow a low salt diet to assure blood pressure remains controlled for cardiovascular risk factor modification.   -The patient is counseled to get regular exercise 3-5 times per week and maintain a healthy weight reduce cardiovascular risk factors. - Continue Lisinopril 20 mg BId, Lopressor 50 mg BID     3. CAD   - Hx of CABG (1993) and stent 2001.   - Continue ASA, ACEI, BB, and statin   - Last seen by Dr. Jace Baptisteoe 2019     4. Tobacco Abuse   - she smokes 15 cigarettes a day and is working on decreasing                         - Discussed benefits of cessation and risks of continued use              - Tobacco cessation counseling completed     5. Carotid Stenosis              - SIVAKUMAR 50-69% per US (10/17)              - On ASA, statin       6. Obesity  Body mass index is 29.49 kg/m². -Excessive weight is complicating assessment and treatment. It is placing patient at risk for multiple co-morbidities as well as early death and contributing to the patient's presentation.  -Discussed weight management with diet and exercise   - She had bariatric surgery and has lost over 200 lbs. Plan:  · Smoking cessation counseling offered. Congratulated her for cutting down to half a pack. · Congratulated her for continuing to watch her weight. She's kept off the weight for over 17 yrs since bariatric surgery. · Loop recorder. · Follow up in six months with NP       Thank you for allowing me to participate in the care of [address] [place] [address]. All questions and concerns were addressed to the patient/family. Alternatives to my treatment were discussed. Barbi Joseph MD, MPH  [facility]   Office: [phone]  Fax: [phone]       H&P Update    I have reviewed the history and physical and examined the patient and updated with relevant changes.      Consent: I have discussed with the patient and/or the patient representative the indication, alternatives, and the possible risks and/or complications of the planned procedure and the anesthesia methods. The patient and/or patient representative appear to understand and agree to proceed. Vitals:    02/17/22 1000   BP: (!) 156/75   Pulse: 75   Resp: 18   SpO2: 94%     Prior to Admission medications    Medication Sig Start Date End Date Taking? Authorizing Provider   ibandronate (BONIVA) 150 MG tablet Take 150 mg by mouth  Patient not taking: Reported on 11/15/2021 9/13/21 9/13/22  Historical Provider, MD   lisinopril (PRINIVIL;ZESTRIL) 20 MG tablet Take 1 tablet by mouth 2 times daily Indications: will start after today's visit 6/26/20   ИВАН Green - CNP   apixaban (ELIQUIS) 5 MG TABS tablet Take 1 tablet by mouth 2 times daily 3/31/19   Cheryl Boothe MD   metoprolol tartrate (LOPRESSOR) 50 MG tablet Take 1 tablet by mouth 2 times daily 3/31/19   Cheryl Boothe MD   diltiazem (CARDIZEM) 30 MG tablet Take 1 tablet by mouth every 8 hours 3/31/19   Cheryl Boothe MD   famotidine (PEPCID) 20 MG tablet Take 1 tablet by mouth 2 times daily 3/31/19   Cheryl Boothe MD   magnesium oxide (MAG-OX) 400 MG tablet Take 1 tablet by mouth daily 3/31/19   Cheryl Boothe MD   ferrous sulfate 325 (65 FE) MG tablet Take 325 mg by mouth daily (with breakfast). Historical Provider, MD   aspirin 81 MG tablet Take 1 tablet by mouth daily. 6/26/13   Wei Delgado MD   calcium carbonate (OSCAL) 500 MG TABS tablet Take 600 mg by mouth 2 times daily. Historical Provider, MD   lovastatin (MEVACOR) 20 MG tablet Take 20 mg by mouth nightly. Historical Provider, MD   metformin (GLUCOPHAGE) 850 MG tablet Take 850 mg by mouth 2 times daily (with meals). Historical Provider, MD   Multiple Vitamin (MULTIVITAMIN PO) Take 1 tablet by mouth daily. Historical Provider, MD   vitamin B-12 (CYANOCOBALAMIN) 1000 MCG tablet Take 500 mcg by mouth daily. Historical Provider, MD     Past Medical History:   Diagnosis Date    Acute CVA (cerebrovascular accident) ([facility] [address] 12/11/2021    Arthritis     CAD (coronary artery disease)     Diabetes mellitus ([facility] [address]     Hyperlipidemia     Hypertension      Past Surgical History:   Procedure Laterality Date    CARDIAC SURGERY  2001    stent    CHOLECYSTECTOMY      in 2007    CORONARY ARTERY BYPASS GRAFT  1993    DILATATION, ESOPHAGUS      gastric bypass    GASTRIC BYPASS SURGERY       Allergies   Allergen Reactions    Codeine Rash    Demerol Rash       Documentation and Exam:   I have personally completed a history, physical exam & review of systems for this patient (see notes). No sedation is needed for this procedure. Only local anesthesia with lidocaine will be used.      Electronically signed by Crescencio Rodriguez MD on 2/17/2022 at 10:10 AM

## 2022-02-17 NOTE — PROCEDURES
[address]     Electrophysiology Procedure Note       Date of Procedure: 2/17/2022  Patient's Name: Chico Duke  YOB: 1950   Medical Record Number: [id number]  Procedure Performed by: Tamara De Souza MD    Procedures performed:  · Loop recorder implantation    Indication of the procedure: Atrial fibrillation, arrhythmia   Chico Duke is a 73 y.o. female here for ILR     Details of procedure:   Procedure's risks, benefits and alternatives of procedure were explained to patient. All questions were answered. Patient understood and informed consent was obtained. The patient was brought to the electrophysiology lab in a fasting nonsedated state. Patient was prepped and draped in usual sterile fashion. After injection of 2% lidocaine in the left 4th intercostal area, a 5 mm small incision was made. Using ILR insertion device, a small subcutaneous tunnel was created and the loop recorder was inserted under skin. Dermabond was used. The skin was covered with Steri-Strips. Device information:   The device is Reveal LINQ  [employer] Implant date: 2/17/2022    Plan:   The patient will have usual post-implant care. Patient can be discharged home if remains stable with follow up in arrhythmia clinic.

## 2022-02-23 DIAGNOSIS — I63.9 ACUTE CVA (CEREBROVASCULAR ACCIDENT) (HCC): ICD-10-CM

## 2022-02-23 DIAGNOSIS — I48.0 PAF (PAROXYSMAL ATRIAL FIBRILLATION) (HCC): ICD-10-CM

## 2022-02-23 DIAGNOSIS — Z45.09 ENCOUNTER FOR ELECTRONIC ANALYSIS OF REVEAL EVENT RECORDER: Primary | ICD-10-CM

## 2022-02-24 ENCOUNTER — NURSE ONLY (OUTPATIENT)
Dept: CARDIOLOGY CLINIC | Age: 72
End: 2022-02-24

## 2022-02-25 NOTE — PROGRESS NOTES
Patient comes in for their 1 week wound check S/p mdtlinq II implant on 2/17/2022. [employer] Interrogation shows AF/L with a 5.6% burden. Last on 2/23/2022 longest 24 minutes. Implanted for AF management. Patient remains on Eliquis and metoprolol. Patients incision is healing nicely. Patient advised to call the office right away with any sign os infection. Please see interrogation for more detail. Patient will follow up in office as scheduled and we will continue to follow the Patient remotely.

## 2022-03-28 ENCOUNTER — NURSE ONLY (OUTPATIENT)
Dept: CARDIOLOGY CLINIC | Age: 72
End: 2022-03-28
Payer: MEDICARE

## 2022-03-28 DIAGNOSIS — Z45.09 ENCOUNTER FOR ELECTRONIC ANALYSIS OF REVEAL EVENT RECORDER: ICD-10-CM

## 2022-03-28 DIAGNOSIS — I63.9 ACUTE CVA (CEREBROVASCULAR ACCIDENT) (HCC): ICD-10-CM

## 2022-03-28 DIAGNOSIS — I48.0 PAF (PAROXYSMAL ATRIAL FIBRILLATION) (HCC): ICD-10-CM

## 2022-03-28 PROCEDURE — G2066 INTER DEVC REMOTE 30D: HCPCS | Performed by: INTERNAL MEDICINE

## 2022-03-28 PROCEDURE — 93298 REM INTERROG DEV EVAL SCRMS: CPT | Performed by: INTERNAL MEDICINE

## 2022-03-28 NOTE — PROGRESS NOTES
We received a remote transmission from patient's monitor at home. Remote Linq report shows known AF. Pt remains on Eliquis. EP physician to review. We will continue to monitor remotely.

## 2022-04-02 NOTE — RESULT ENCOUNTER NOTE
Reviewed. Findings are within acceptable parameters. Continue monitoring.     Beth Lamas MD [facility]

## 2022-05-02 ENCOUNTER — NURSE ONLY (OUTPATIENT)
Dept: CARDIOLOGY CLINIC | Age: 72
End: 2022-05-02
Payer: MEDICARE

## 2022-05-02 DIAGNOSIS — Z45.09 ENCOUNTER FOR ELECTRONIC ANALYSIS OF REVEAL EVENT RECORDER: ICD-10-CM

## 2022-05-02 DIAGNOSIS — I63.9 ACUTE CVA (CEREBROVASCULAR ACCIDENT) (HCC): ICD-10-CM

## 2022-05-02 DIAGNOSIS — I48.0 PAF (PAROXYSMAL ATRIAL FIBRILLATION) (HCC): ICD-10-CM

## 2022-05-02 NOTE — PROGRESS NOTES
We received a remote transmission from patient's monitor at home. Remote Linq report shows known SVT and AF. Pt remains on Eliquis. EP physician to review. We will continue to monitor remotely.

## 2022-05-03 PROCEDURE — 93298 REM INTERROG DEV EVAL SCRMS: CPT | Performed by: INTERNAL MEDICINE

## 2022-05-03 PROCEDURE — G2066 INTER DEVC REMOTE 30D: HCPCS | Performed by: INTERNAL MEDICINE

## 2022-05-17 ENCOUNTER — OFFICE VISIT (OUTPATIENT)
Dept: CARDIOLOGY CLINIC | Age: 72
End: 2022-05-17
Payer: MEDICARE

## 2022-05-17 ENCOUNTER — NURSE ONLY (OUTPATIENT)
Dept: CARDIOLOGY CLINIC | Age: 72
End: 2022-05-17

## 2022-05-17 VITALS
DIASTOLIC BLOOD PRESSURE: 125 MMHG | WEIGHT: 153.4 LBS | SYSTOLIC BLOOD PRESSURE: 148 MMHG | BODY MASS INDEX: 30.12 KG/M2 | HEIGHT: 60 IN | HEART RATE: 91 BPM | OXYGEN SATURATION: 97 %

## 2022-05-17 DIAGNOSIS — I10 HTN (HYPERTENSION), BENIGN: ICD-10-CM

## 2022-05-17 DIAGNOSIS — I63.9 ACUTE CVA (CEREBROVASCULAR ACCIDENT) (HCC): ICD-10-CM

## 2022-05-17 DIAGNOSIS — Z45.09 ENCOUNTER FOR ELECTRONIC ANALYSIS OF REVEAL EVENT RECORDER: ICD-10-CM

## 2022-05-17 DIAGNOSIS — I25.119 CORONARY ARTERY DISEASE INVOLVING NATIVE CORONARY ARTERY OF NATIVE HEART WITH ANGINA PECTORIS (HCC): Primary | ICD-10-CM

## 2022-05-17 PROCEDURE — G8400 PT W/DXA NO RESULTS DOC: HCPCS | Performed by: INTERNAL MEDICINE

## 2022-05-17 PROCEDURE — 4004F PT TOBACCO SCREEN RCVD TLK: CPT | Performed by: INTERNAL MEDICINE

## 2022-05-17 PROCEDURE — 3017F COLORECTAL CA SCREEN DOC REV: CPT | Performed by: INTERNAL MEDICINE

## 2022-05-17 PROCEDURE — G8417 CALC BMI ABV UP PARAM F/U: HCPCS | Performed by: INTERNAL MEDICINE

## 2022-05-17 PROCEDURE — 1090F PRES/ABSN URINE INCON ASSESS: CPT | Performed by: INTERNAL MEDICINE

## 2022-05-17 PROCEDURE — 4040F PNEUMOC VAC/ADMIN/RCVD: CPT | Performed by: INTERNAL MEDICINE

## 2022-05-17 PROCEDURE — G8427 DOCREV CUR MEDS BY ELIG CLIN: HCPCS | Performed by: INTERNAL MEDICINE

## 2022-05-17 PROCEDURE — 1123F ACP DISCUSS/DSCN MKR DOCD: CPT | Performed by: INTERNAL MEDICINE

## 2022-05-17 PROCEDURE — 99214 OFFICE O/P EST MOD 30 MIN: CPT | Performed by: INTERNAL MEDICINE

## 2022-05-17 RX ORDER — AMLODIPINE BESYLATE 5 MG/1
5 TABLET ORAL DAILY
COMMUNITY
Start: 2022-02-17 | End: 2023-02-17

## 2022-05-17 NOTE — PROGRESS NOTES
Interrogation shows AF with a 10.4% burden. RVR. Noted. Currently in AF today. 10 Tachy episodes noted. Last on 1/1/2022, longest 20 minutes. Implanted for AF management. Patient remains on Eliquis and metoprolol. Please see interrogation for more detail. Patient will see Dr. Boris Armstrong today and we will continue to follow the Patient remotely.

## 2022-05-17 NOTE — PROGRESS NOTES
[address]   Electrophysiology Follow up   Date: 5/17/2022  I had the privilege of visiting Sandra Hines in the office. CC: recurrent atrial fibrillation/ racing heart   HPI: Sandra Hines is a 73 y.o. female  with a history of 81 y.o. female with a past medical history of CAD s/p CABG (1993), DM, HTN, atrial fibrillation, and HLD. Hx of gastric bypass with extensive weight loss (170lbs).      She was admitted 03/2019 with atrial flutter with RVR. Her magnesium was noted to be 1.5 . She does have a history of hypomagnesemia.      Presented to ED at [facility] 10/05/2021 with palpitations and \" racing heart\" . Symptoms abated by the time she arrived tot he ER. EKG showed afib rate 94 . Magnesium was noted to low at 1.4 . Instructed to take additional dose of magnesium for 10 days.      Presented to the ED 12/11/2021 - left sided numbness, weakness and slurring words. MRI showed acute ischemic lacunar infarct in the right [place]     S/p ILR 02/17/2022     Daxa presents today in follow up. She states she can tell when she is in atrial fibrillation . She is in afib now. She feels tired, dizzy when she is atrial fib. Denies Syncope. She has restricted activity due to stroke. Assessment and plan:   - Paroxysmal Atrial fibrillation    ECG today atrial fibrillation rate 94    [place] on interrogation toay 10.4 %     S/p Loop implant  02/17/2022- recurrent  Atrial fibrillation       She is symptomatic with these episodes with lightheadedness and dizziness and fatigue      Continue lopressor 100 mg BID    Patient has a [id number] Score of 7( age,gender,HTN ,DM, CVA,CAD  )  ~  continue Eliquis.    ~  Creatinine 0.6 12/11/2021    TSH - last drawn 2019     -We discussed treatment options including antiarrhythmics, rate control with anticoagulation, and ablation.   -We discussed progressive nature of atrial fibrillation. Treatment success decreases when AF becomes persistent and last more than 6 months. blood pressure remains controlled for cardiovascular risk factor modification.   -The patient is counseled to get regular exercise 3-5 times per week and maintain a healthy weight reduce cardiovascular risk factors. Continue current medication -  Norvasc,  Metoprolol, lisinopril      Follows with PCP      Coronary Artery Disease    - s/p CABG 1993 , Stent 2001   - Cath 2004 Patent LI<A ,Occluded Cx with collaterals    - continue current medication      Tobacco Abuse   - discussed cessation     Carotid Stenosis              - SIVAKUMAR 50-69% per US (10/17)              - On ASA, statin     Overweight   Body mass index is 29.96 kg/m². -Excessive weight is complicating assessment and treatment.  It is placing patient at risk for multiple co-morbidities as well as early death and contributing to the patient's presentation.  -Discussed weight management with diet and exercise          Patient Active Problem List    Diagnosis Date Noted    Encounter for electronic analysis of reveal event recorder 02/23/2022    Acute CVA (cerebrovascular accident) ([facility] 12/11/2021    PAF (paroxysmal atrial fibrillation) ([facility] [address] 06/08/2020    Class 1 obesity due to excess calories with serious comorbidity and body mass index (BMI) of 30.0 to 30.9 in adult 06/08/2020    Tobacco abuse 04/13/2020    DM (diabetes mellitus), type 2, uncontrolled with complications ([facility] [address] 31/74/2268    CAD (coronary artery disease) 11/04/2011    HTN (hypertension), benign 11/04/2011    Dyslipidemia 11/04/2011    Bruit of right carotid artery 11/04/2011     Diagnostic studies:   ECG 5/17/22  Afib - rate 94    391 QTcH,  82 QRS      Stress Test:    GXT: 4/19   There is normal isotope uptake at stress and rest. There is no evidence of   myocardial ischemia or scar.   Normal LV function.   Hypertension   Overall findings represent a low risk scan.       Echo: 3/19   -Normal left ventricle size, wall thickness, and systolic function with an   estimated ejection fraction of 55%.  -Indeterminate diastolic function.   -Mitral annular calcification is present. Mild mitral regurgitation.   -The left atrium is mildly dilated.   -The aortic valve appears sclerotic but opens well.   -Mild tricuspid regurgitation with PASP of 40 mmHg.   -Mild pulmonic regurgitation present     Cath:   2014  Patent LIMA to LAD, occluded Cx with small collaterals  I independently reviewed the ECG, MCOT, echocardiogram, stress test, and coronary angiography/PCI results and used them for my plan of care. I independently reviewed the cardiac diagnostic studies, ECG and relevant imaging studies. Lab Results   Component Value Date    LVEF 58 12/13/2021     Lab Results   Component Value Date    TSH 1.53 03/30/2019         Physical Examination:  Vitals:    05/17/22 0938   BP: (!) 148/125   Pulse:    SpO2:       Wt Readings from Last 3 Encounters:   05/17/22 153 lb 6.4 oz (69.6 kg)   02/17/22 151 lb (68.5 kg)   12/11/21 146 lb 12.8 oz (66.6 kg)       · Constitutional: Oriented. No distress. · Head: Normocephalic and atraumatic. · Mouth/Throat: Oropharynx is clear and moist.   · Eyes: Conjunctivae normal. EOM are normal.   · Neck: Neck supple. No JVD present. · Cardiovascular: Normal rate, Irregular rhythm, S1&S2. · Pulmonary/Chest: Bilateral respiratory sounds. No rhonchi. · Abdominal: Soft. No tenderness. · Musculoskeletal: No tenderness. No edema    · Lymphadenopathy: Has no cervical adenopathy. · Neurological: Alert and oriented. Follows command, No Gross deficit   · Skin: Skin is warm, No rash noted. · Psychiatric: Has a normal behavior          Review of System:  [x] Full ROS obtained and negative except as mentioned in HPI    Prior to Admission medications    Medication Sig Start Date End Date Taking?  Authorizing Provider   amLODIPine (NORVASC) 5 MG tablet Take 5 mg by mouth daily 2/17/22 2/17/23 Yes Historical Provider, MD   lisinopril (PRINIVIL;ZESTRIL) 20 MG tablet Take 1 tablet by mouth 2 times daily Indications: will start after today's visit 6/26/20  Yes Paresh Nicholas, APRN - CNP   apixaban (ELIQUIS) 5 MG TABS tablet Take 1 tablet by mouth 2 times daily 3/31/19  Yes Ferdinand Vaca MD   metoprolol tartrate (LOPRESSOR) 50 MG tablet Take 1 tablet by mouth 2 times daily  Patient taking differently: Take 100 mg by mouth 2 times daily  3/31/19  Yes Ferdinand Vaca MD   famotidine (PEPCID) 20 MG tablet Take 1 tablet by mouth 2 times daily 3/31/19  Yes Ferdinand Vaca MD   magnesium oxide (MAG-OX) 400 MG tablet Take 1 tablet by mouth daily 3/31/19  Yes Ferdinand Vaca MD   ferrous sulfate 325 (65 FE) MG tablet Take 325 mg by mouth daily (with breakfast). Yes Historical Provider, MD   aspirin 81 MG tablet Take 1 tablet by mouth daily. 6/26/13  Yes Serina Rinne, MD   calcium carbonate (OSCAL) 500 MG TABS tablet Take 600 mg by mouth 2 times daily. Yes Historical Provider, MD   lovastatin (MEVACOR) 20 MG tablet Take 20 mg by mouth nightly. Yes Historical Provider, MD   metformin (GLUCOPHAGE) 850 MG tablet Take 850 mg by mouth 2 times daily (with meals). Yes Historical Provider, MD   Multiple Vitamin (MULTIVITAMIN PO) Take 1 tablet by mouth daily. Yes Historical Provider, MD   vitamin B-12 (CYANOCOBALAMIN) 1000 MCG tablet Take 500 mcg by mouth daily. Yes Historical Provider, MD       Allergies   Allergen Reactions    Codeine Rash    Demerol Rash       Social History:  Reviewed. reports that she has been smoking cigarettes. She has a 30.00 pack-year smoking history. She has never used smokeless tobacco. She reports that she does not drink alcohol and does not use drugs. Family History:  Reviewed. Reviewed. No family history of SCD. Relevant and available labs, and cardiovascular diagnostics reviewed. Reviewed. I independently reviewed relevant and available cardiac diagnostic tests ECG, CXR, Echo, Stress test, Device interrogation, Holter, CT scan. Outside medical records via Care everywhere reviewed and summarized in H&P above. Complex medical condition with multiple medical problems affecting prognosis and outcome of EP interventions      All questions and concerns were addressed to the patient/family. Alternatives to my treatment were discussed. I have discussed the above stated plan and the patient verbalized understanding and agreed with the plan. Scribe attestation: This note was scribed in the presence of Nisha Mistry MD by Guillermo Crocker RN  Physician Attestation: I, Dr. Nisha Mistry, confirm that the scribe's documentation has been prepared under my direction and personally reviewed by me in its entirety. I also confirm that the note above accurately reflects all work, treatment, procedures, and medical decision making performed by me. NOTE: This report was transcribed using voice recognition software. Every effort was made to ensure accuracy, however, inadvertent computerized transcription errors may be present.      Nisha Mistry MD, MPH  [place] [address]   Office: [phone]  Fax: [phone]

## 2022-05-27 PROCEDURE — G2066 INTER DEVC REMOTE 30D: HCPCS | Performed by: INTERNAL MEDICINE

## 2022-05-27 PROCEDURE — 93298 REM INTERROG DEV EVAL SCRMS: CPT | Performed by: INTERNAL MEDICINE

## 2022-06-02 ENCOUNTER — NURSE ONLY (OUTPATIENT)
Dept: CARDIOLOGY CLINIC | Age: 72
End: 2022-06-02
Payer: MEDICARE

## 2022-06-02 DIAGNOSIS — I48.0 PAF (PAROXYSMAL ATRIAL FIBRILLATION) (HCC): ICD-10-CM

## 2022-06-02 DIAGNOSIS — I63.9 ACUTE CVA (CEREBROVASCULAR ACCIDENT) (HCC): ICD-10-CM

## 2022-06-02 DIAGNOSIS — Z45.09 ENCOUNTER FOR ELECTRONIC ANALYSIS OF REVEAL EVENT RECORDER: ICD-10-CM

## 2022-07-11 ENCOUNTER — NURSE ONLY (OUTPATIENT)
Dept: CARDIOLOGY CLINIC | Age: 72
End: 2022-07-11
Payer: MEDICARE

## 2022-07-11 DIAGNOSIS — I63.9 ACUTE CVA (CEREBROVASCULAR ACCIDENT) (HCC): ICD-10-CM

## 2022-07-11 DIAGNOSIS — Z45.09 ENCOUNTER FOR ELECTRONIC ANALYSIS OF REVEAL EVENT RECORDER: ICD-10-CM

## 2022-07-11 DIAGNOSIS — I48.0 PAF (PAROXYSMAL ATRIAL FIBRILLATION) (HCC): ICD-10-CM

## 2022-07-11 PROCEDURE — G2066 INTER DEVC REMOTE 30D: HCPCS | Performed by: INTERNAL MEDICINE

## 2022-07-11 PROCEDURE — 93298 REM INTERROG DEV EVAL SCRMS: CPT | Performed by: INTERNAL MEDICINE

## 2022-07-13 NOTE — PROGRESS NOTES
We received a remote transmission from patient's monitor at home. Remote Linq report shows known AF. Pt remains on Eliquis. EP physician to review. We will continue to monitor remotely. End of 31-day monitoring period 7-11-22.

## 2022-08-15 ENCOUNTER — NURSE ONLY (OUTPATIENT)
Dept: CARDIOLOGY CLINIC | Age: 72
End: 2022-08-15
Payer: MEDICARE

## 2022-08-15 DIAGNOSIS — I63.9 ACUTE CVA (CEREBROVASCULAR ACCIDENT) (HCC): ICD-10-CM

## 2022-08-15 DIAGNOSIS — Z45.09 ENCOUNTER FOR ELECTRONIC ANALYSIS OF REVEAL EVENT RECORDER: ICD-10-CM

## 2022-08-15 DIAGNOSIS — I48.0 PAF (PAROXYSMAL ATRIAL FIBRILLATION) (HCC): ICD-10-CM

## 2022-08-15 PROCEDURE — G2066 INTER DEVC REMOTE 30D: HCPCS | Performed by: INTERNAL MEDICINE

## 2022-08-15 PROCEDURE — 93298 REM INTERROG DEV EVAL SCRMS: CPT | Performed by: INTERNAL MEDICINE

## 2022-09-19 ENCOUNTER — NURSE ONLY (OUTPATIENT)
Dept: CARDIOLOGY CLINIC | Age: 72
End: 2022-09-19

## 2022-09-19 DIAGNOSIS — I63.9 ACUTE CVA (CEREBROVASCULAR ACCIDENT) (HCC): ICD-10-CM

## 2022-09-19 DIAGNOSIS — I48.0 PAF (PAROXYSMAL ATRIAL FIBRILLATION) (HCC): ICD-10-CM

## 2022-09-19 DIAGNOSIS — Z45.09 ENCOUNTER FOR ELECTRONIC ANALYSIS OF REVEAL EVENT RECORDER: ICD-10-CM

## 2022-09-19 NOTE — PROGRESS NOTES
We received a remote transmission from patient's monitor at home. Remote Linq report shows no arrhythmias. EP physician to review. We will continue to monitor remotely. Implanted for AF management. Pt is on Eliquis. End of 31-day monitoring period 9-19-22.

## 2022-10-24 ENCOUNTER — NURSE ONLY (OUTPATIENT)
Dept: CARDIOLOGY CLINIC | Age: 72
End: 2022-10-24
Payer: MEDICARE

## 2022-10-24 DIAGNOSIS — I48.0 PAF (PAROXYSMAL ATRIAL FIBRILLATION) (HCC): ICD-10-CM

## 2022-10-24 DIAGNOSIS — Z45.09 ENCOUNTER FOR ELECTRONIC ANALYSIS OF REVEAL EVENT RECORDER: ICD-10-CM

## 2022-10-24 DIAGNOSIS — I63.9 ACUTE CVA (CEREBROVASCULAR ACCIDENT) (HCC): ICD-10-CM

## 2022-10-24 PROCEDURE — G2066 INTER DEVC REMOTE 30D: HCPCS | Performed by: INTERNAL MEDICINE

## 2022-10-24 PROCEDURE — 93298 REM INTERROG DEV EVAL SCRMS: CPT | Performed by: INTERNAL MEDICINE

## 2022-10-24 NOTE — PROGRESS NOTES
We received a remote transmission from patient's monitor at home. Remote Linq report shows AF. EP physician to review. We will continue to monitor remotely. Implanted for AF management. Pt is on Eliquis. End of 31-day monitoring period 10-24-22.

## 2022-10-28 NOTE — PROGRESS NOTES
[facility]   Electrophysiology Follow up   Date: 11/8/2022  I had the privilege of visiting Alanna Ortega in the office. CC: Atrial Fibrillation  HPI: Alanna Ortega is a 81 y.o. female  with a history of 81 y.o. female with a past medical history of CAD s/p CABG (1993), DM, HTN, atrial fibrillation, and HLD. Hx of gastric bypass with extensive weight loss (170lbs). She was admitted 03/2019 with atrial flutter with RVR. Her magnesium was noted to be 1.5 . She does have a history of hypomagnesemia. Presented to ED at [facility] 10/05/2021 with palpitations and \" racing heart\" . EKG showed afib rate 94 . Magnesium was noted to low at 1.4 . Instructed to take additional dose of magnesium for 10 days. Presented to the ED 12/11/2021 - left sided numbness, weakness and slurring words. MRI showed acute ischemic lacunar infarct in the right [place]     S/p ILR 02/17/2022     Daxa presents today in follow up. Patient reports that she is doing well. Patient fearful of exercising as she states that she is afraid of going into AF. Patient reports that she monitors BP and heart rate at home. Assessment and plan:   - Paroxysmal Atrial fibrillation    ECG today shows sinus rhythm   0.7% AT/AF burden per device interrogation today. S/p Loop implant  02/17/2022    She is symptomatic with these episodes with lightheadedness and dizziness and fatigue    Continue lopressor 100 mg BID    Patient has a [id number] Score of 7( age,gender,HTN ,DM, CVA,CAD )~  continue Eliquis.    ~  Creatinine 0.6 12/11/2021    TSH - last drawn 2019     Today she states that she is doing well.   Her atrial fibrillation burden is low  We discussed treatment options including atrial fibrillation ablation if A. fib burden increases or she becomes symptomatic with episodes of atrial fibrillation.    - S/p CVA 12/11/2021   Continue Eliquis,ASA , statin     ILR 02/17/2022      - Implanted loop recorder  02/17/2022  The CIED was interrogated and programmed and I supervised and reviewed all the data. All findings and changes are in device interrogation sheet and reflect my personal interpretation and changes and is scanned to Epic. 0.7% AT/AF burden per device interrogation today. - HTN   Borderline: 138/78  -BP goal <130/80  -Home BP monitoring encouraged, printed information provided on how to accurately measure BP at home.  -Counseled to follow a low salt diet to assure blood pressure remains controlled for cardiovascular risk factor modification.   -The patient is counseled to get regular exercise 3-5 times per week and maintain a healthy weight reduce cardiovascular risk factors. Continue current medication -  Norvasc,  Metoprolol, lisinopril      Follows with PCP      Coronary Artery Disease    - s/p CABG 1993 , Stent 2001   - Cath 2004 Patent LI<A ,Occluded Cx with collaterals    - continue current medication      Tobacco Abuse   - discussed cessation     Carotid Stenosis              - SIVAKUMAR 50-69% per US (10/17)              - On ASA, statin     Overweight : Body mass index is 31.17 kg/m². -Excessive weight is complicating assessment and treatment.  It is placing patient at risk for multiple co-morbidities as well as early death and contributing to the patient's presentation.  -Discussed weight management with diet and exercise          Patient Active Problem List    Diagnosis Date Noted    Encounter for electronic analysis of reveal event recorder 02/23/2022    Acute CVA (cerebrovascular accident) ([facility] [address] 12/11/2021    PAF (paroxysmal atrial fibrillation) ([facility] [address] 06/08/2020    Class 1 obesity due to excess calories with serious comorbidity and body mass index (BMI) of 30.0 to 30.9 in adult 06/08/2020    Tobacco abuse 04/13/2020    DM (diabetes mellitus), type 2, uncontrolled with complications 04/69/9824    CAD (coronary artery disease) 11/04/2011    HTN (hypertension), benign 11/04/2011    Dyslipidemia 11/04/2011    Bruit of right carotid artery 11/04/2011     Diagnostic studies:   ECG 11/8/22  SR  407 QTcH, 80 QRS    Stress Test:    GXT: 4/19   There is normal isotope uptake at stress and rest. There is no evidence of   myocardial ischemia or scar. Normal LV function. Hypertension   Overall findings represent a low risk scan. Echo: 3/19   -Normal left ventricle size, wall thickness, and systolic function with an   estimated ejection fraction of 55%. -Indeterminate diastolic function.   -Mitral annular calcification is present. Mild mitral regurgitation.   -The left atrium is mildly dilated. -The aortic valve appears sclerotic but opens well. -Mild tricuspid regurgitation with PASP of 40 mmHg. -Mild pulmonic regurgitation present     Cath:   2014  Patent LIMA to LAD, occluded Cx with small collaterals  I independently reviewed the ECG, MCOT, echocardiogram, stress test, and coronary angiography/PCI results and used them for my plan of care. I independently reviewed the cardiac diagnostic studies, ECG and relevant imaging studies. Lab Results   Component Value Date    LVEF 58 12/13/2021     Lab Results   Component Value Date    TSH 1.53 03/30/2019         Physical Examination:  Vitals:    11/08/22 0942   BP: 138/78   Pulse: 78   SpO2: 98%        Wt Readings from Last 3 Encounters:   11/08/22 159 lb 9.6 oz (72.4 kg)   05/17/22 153 lb 6.4 oz (69.6 kg)   02/17/22 151 lb (68.5 kg)       Constitutional: Oriented. No distress. Head: Normocephalic and atraumatic. Mouth/Throat: Oropharynx is clear and moist.   Eyes: Conjunctivae normal. EOM are normal.   Neck: Neck supple. No JVD present. Cardiovascular: Normal rate, Irregular rhythm, S1&S2. Pulmonary/Chest: Bilateral respiratory sounds. No rhonchi. Abdominal: Soft. No tenderness. Musculoskeletal: No tenderness. No edema    Lymphadenopathy: Has no cervical adenopathy. Neurological: Alert and oriented.  Follows command, No Gross deficit   Skin: Skin is warm, No rash noted. Psychiatric: Has a normal behavior          Review of System:  [x] Full ROS obtained and negative except as mentioned in HPI    Prior to Admission medications    Medication Sig Start Date End Date Taking? Authorizing Provider   amLODIPine (NORVASC) 5 MG tablet Take 5 mg by mouth daily 2/17/22 2/17/23 Yes Historical Provider, MD   lisinopril (PRINIVIL;ZESTRIL) 20 MG tablet Take 1 tablet by mouth 2 times daily Indications: will start after today's visit 6/26/20  Yes Nimisha North, APRN - CNP   apixaban (ELIQUIS) 5 MG TABS tablet Take 1 tablet by mouth 2 times daily 3/31/19  Yes Antonio Ramirez MD   metoprolol tartrate (LOPRESSOR) 50 MG tablet Take 1 tablet by mouth 2 times daily  Patient taking differently: Take 100 mg by mouth 2 times daily 3/31/19  Yes Antonio Ramirez MD   famotidine (PEPCID) 20 MG tablet Take 1 tablet by mouth 2 times daily 3/31/19  Yes Antonio Ramirez MD   magnesium oxide (MAG-OX) 400 MG tablet Take 1 tablet by mouth daily 3/31/19  Yes Antonio Ramirez MD   ferrous sulfate 325 (65 FE) MG tablet Take 325 mg by mouth daily (with breakfast). Yes Historical Provider, MD   aspirin 81 MG tablet Take 1 tablet by mouth daily. 6/26/13  Yes Greta Chinchilla MD   calcium carbonate (OSCAL) 500 MG TABS tablet Take 600 mg by mouth 2 times daily. Yes Historical Provider, MD   lovastatin (MEVACOR) 20 MG tablet Take 20 mg by mouth nightly. Yes Historical Provider, MD   metformin (GLUCOPHAGE) 850 MG tablet Take 850 mg by mouth 2 times daily (with meals). Yes Historical Provider, MD   Multiple Vitamin (MULTIVITAMIN PO) Take 1 tablet by mouth daily. Yes Historical Provider, MD   vitamin B-12 (CYANOCOBALAMIN) 1000 MCG tablet Take 500 mcg by mouth daily. Yes Historical Provider, MD       Allergies   Allergen Reactions    Codeine Rash    Demerol Rash       Social History:  Reviewed. reports that she has been smoking cigarettes. She has a 30.00 pack-year smoking history. She has never used smokeless tobacco. She reports that she does not drink alcohol and does not use drugs. Family History:  Reviewed. Reviewed. No family history of SCD. Relevant and available labs, and cardiovascular diagnostics reviewed. Reviewed. I independently reviewed relevant and available cardiac diagnostic tests ECG, CXR, Echo, Stress test, Device interrogation, Holter, CT scan. Outside medical records via Care everywhere reviewed and summarized in H&P above. Complex medical condition with multiple medical problems affecting prognosis and outcome of EP interventions      All questions and concerns were addressed to the patient/family. Alternatives to my treatment were discussed. I have discussed the above stated plan and the patient verbalized understanding and agreed with the plan. Scribe attestation: This note was scribed in the presence of Sendy Humphrey MD by Pato Julien LPN  Physician Attestation: I, Dr. Sendy Humphrey, confirm that the scribe's documentation has been prepared under my direction and personally reviewed by me in its entirety. I also confirm that the note above accurately reflects all work, treatment, procedures, and medical decision making performed by me. NOTE: This report was transcribed using voice recognition software. Every effort was made to ensure accuracy, however, inadvertent computerized transcription errors may be present.      Sendy Humphrey MD, MPH  [facility]   Office: [phone]  Fax: [phone]

## 2022-11-08 ENCOUNTER — OFFICE VISIT (OUTPATIENT)
Dept: CARDIOLOGY CLINIC | Age: 72
End: 2022-11-08
Payer: MEDICARE

## 2022-11-08 ENCOUNTER — NURSE ONLY (OUTPATIENT)
Dept: CARDIOLOGY CLINIC | Age: 72
End: 2022-11-08

## 2022-11-08 VITALS
DIASTOLIC BLOOD PRESSURE: 78 MMHG | HEART RATE: 78 BPM | SYSTOLIC BLOOD PRESSURE: 138 MMHG | WEIGHT: 159.6 LBS | HEIGHT: 60 IN | OXYGEN SATURATION: 98 % | BODY MASS INDEX: 31.33 KG/M2

## 2022-11-08 DIAGNOSIS — I48.0 PAF (PAROXYSMAL ATRIAL FIBRILLATION) (HCC): Primary | ICD-10-CM

## 2022-11-08 PROCEDURE — G8427 DOCREV CUR MEDS BY ELIG CLIN: HCPCS | Performed by: INTERNAL MEDICINE

## 2022-11-08 PROCEDURE — 3074F SYST BP LT 130 MM HG: CPT | Performed by: INTERNAL MEDICINE

## 2022-11-08 PROCEDURE — 1090F PRES/ABSN URINE INCON ASSESS: CPT | Performed by: INTERNAL MEDICINE

## 2022-11-08 PROCEDURE — 3078F DIAST BP <80 MM HG: CPT | Performed by: INTERNAL MEDICINE

## 2022-11-08 PROCEDURE — G8417 CALC BMI ABV UP PARAM F/U: HCPCS | Performed by: INTERNAL MEDICINE

## 2022-11-08 PROCEDURE — 93000 ELECTROCARDIOGRAM COMPLETE: CPT | Performed by: INTERNAL MEDICINE

## 2022-11-08 PROCEDURE — 3017F COLORECTAL CA SCREEN DOC REV: CPT | Performed by: INTERNAL MEDICINE

## 2022-11-08 PROCEDURE — 1123F ACP DISCUSS/DSCN MKR DOCD: CPT | Performed by: INTERNAL MEDICINE

## 2022-11-08 PROCEDURE — 99214 OFFICE O/P EST MOD 30 MIN: CPT | Performed by: INTERNAL MEDICINE

## 2022-11-08 PROCEDURE — 4004F PT TOBACCO SCREEN RCVD TLK: CPT | Performed by: INTERNAL MEDICINE

## 2022-11-08 PROCEDURE — G8400 PT W/DXA NO RESULTS DOC: HCPCS | Performed by: INTERNAL MEDICINE

## 2022-11-08 PROCEDURE — G8484 FLU IMMUNIZE NO ADMIN: HCPCS | Performed by: INTERNAL MEDICINE

## 2022-11-08 NOTE — PROGRESS NOTES
Carelink Interrogation shows AF with a 0.7% burden since 10/24/2022. 1 AF episode noted on 11/3/2022 lasting 2 1/2 hours. Implanted for AF management. Patient remains on Eliquis and metoprolol. Please see interrogation for more detail. Patient will see Dr. Luh Pabon today and we will continue to follow the Patient remotely.

## 2022-11-28 ENCOUNTER — NURSE ONLY (OUTPATIENT)
Dept: CARDIOLOGY CLINIC | Age: 72
End: 2022-11-28
Payer: MEDICARE

## 2022-11-28 DIAGNOSIS — Z45.09 ENCOUNTER FOR ELECTRONIC ANALYSIS OF REVEAL EVENT RECORDER: ICD-10-CM

## 2022-11-28 DIAGNOSIS — I48.0 PAF (PAROXYSMAL ATRIAL FIBRILLATION) (HCC): ICD-10-CM

## 2022-11-28 DIAGNOSIS — I63.9 ACUTE CVA (CEREBROVASCULAR ACCIDENT) (HCC): ICD-10-CM

## 2022-11-28 PROCEDURE — G2066 INTER DEVC REMOTE 30D: HCPCS | Performed by: INTERNAL MEDICINE

## 2022-11-28 PROCEDURE — 93298 REM INTERROG DEV EVAL SCRMS: CPT | Performed by: INTERNAL MEDICINE

## 2022-11-28 NOTE — PROGRESS NOTES
We received a remote transmission from patient's monitor at home. Remote Linq report shows AF. EP physician to review. We will continue to monitor remotely. Implanted for AF management. Pt is on Eliquis. End of 31-day monitoring period 11-28-22.

## 2023-01-03 ENCOUNTER — NURSE ONLY (OUTPATIENT)
Dept: CARDIOLOGY CLINIC | Age: 73
End: 2023-01-03
Payer: MEDICARE

## 2023-01-03 DIAGNOSIS — I48.0 PAF (PAROXYSMAL ATRIAL FIBRILLATION) (HCC): ICD-10-CM

## 2023-01-03 DIAGNOSIS — I63.9 ACUTE CVA (CEREBROVASCULAR ACCIDENT) (HCC): ICD-10-CM

## 2023-01-03 DIAGNOSIS — Z45.09 ENCOUNTER FOR ELECTRONIC ANALYSIS OF REVEAL EVENT RECORDER: ICD-10-CM

## 2023-01-03 PROCEDURE — G2066 INTER DEVC REMOTE 30D: HCPCS | Performed by: INTERNAL MEDICINE

## 2023-01-03 PROCEDURE — 93298 REM INTERROG DEV EVAL SCRMS: CPT | Performed by: INTERNAL MEDICINE

## 2023-01-03 NOTE — PROGRESS NOTES
We received a remote transmission from patient's monitor at home. Remote Linq report shows AF. EP physician to review. We will continue to monitor remotely. Implanted for AF management. Pt is on Eliquis. End of 31-day monitoring period 1-3-23.

## 2023-02-06 NOTE — PROGRESS NOTES
We received a remote transmission from patient's monitor at home. Remote Linq report shows AF. EP physician to review. We will continue to monitor remotely. Implanted for AF management. Pt is on Eliquis. End of 31-day monitoring period 2-7-23.

## 2023-02-07 ENCOUNTER — NURSE ONLY (OUTPATIENT)
Dept: CARDIOLOGY CLINIC | Age: 73
End: 2023-02-07
Payer: MEDICARE

## 2023-02-07 DIAGNOSIS — I63.9 ACUTE CVA (CEREBROVASCULAR ACCIDENT) (HCC): ICD-10-CM

## 2023-02-07 DIAGNOSIS — I48.0 PAF (PAROXYSMAL ATRIAL FIBRILLATION) (HCC): ICD-10-CM

## 2023-02-07 DIAGNOSIS — Z45.09 ENCOUNTER FOR ELECTRONIC ANALYSIS OF REVEAL EVENT RECORDER: ICD-10-CM

## 2023-02-07 PROCEDURE — G2066 INTER DEVC REMOTE 30D: HCPCS | Performed by: INTERNAL MEDICINE

## 2023-02-07 PROCEDURE — 93298 REM INTERROG DEV EVAL SCRMS: CPT | Performed by: INTERNAL MEDICINE

## 2023-03-14 ENCOUNTER — NURSE ONLY (OUTPATIENT)
Dept: CARDIOLOGY CLINIC | Age: 73
End: 2023-03-14

## 2023-03-14 DIAGNOSIS — I63.9 ACUTE CVA (CEREBROVASCULAR ACCIDENT) (HCC): ICD-10-CM

## 2023-03-14 DIAGNOSIS — Z45.09 ENCOUNTER FOR ELECTRONIC ANALYSIS OF REVEAL EVENT RECORDER: ICD-10-CM

## 2023-03-14 DIAGNOSIS — I48.0 PAF (PAROXYSMAL ATRIAL FIBRILLATION) (HCC): ICD-10-CM

## 2023-03-15 NOTE — RESULT ENCOUNTER NOTE
atrial flutter with RVR and 5 sec post conversion pause. Reviewed.     Roxane Bhakta MD [facility]

## 2023-03-26 ENCOUNTER — APPOINTMENT (OUTPATIENT)
Dept: GENERAL RADIOLOGY | Age: 73
DRG: 522 | End: 2023-03-26
Payer: MEDICARE

## 2023-03-26 ENCOUNTER — APPOINTMENT (OUTPATIENT)
Dept: CT IMAGING | Age: 73
DRG: 522 | End: 2023-03-26
Payer: MEDICARE

## 2023-03-26 ENCOUNTER — HOSPITAL ENCOUNTER (INPATIENT)
Age: 73
LOS: 3 days | Discharge: NURSING, REHAB OR LONG-TERM CARE | DRG: 522 | End: 2023-03-29
Attending: EMERGENCY MEDICINE | Admitting: FAMILY MEDICINE
Payer: MEDICARE

## 2023-03-26 DIAGNOSIS — E87.1 HYPONATREMIA: ICD-10-CM

## 2023-03-26 DIAGNOSIS — J96.01 ACUTE RESPIRATORY FAILURE WITH HYPOXIA (HCC): ICD-10-CM

## 2023-03-26 DIAGNOSIS — S72.002A CLOSED FRACTURE OF NECK OF LEFT FEMUR, INITIAL ENCOUNTER (HCC): Primary | ICD-10-CM
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
## 2023-03-26 PROBLEM — S72.042S: Status: ACTIVE | Noted: 2023-03-26

## 2023-03-26 LAB
ALBUMIN SERPL-MCNC: 4.4 G/DL (ref 3.4–5)
ALBUMIN/GLOB SERPL: 1.4 {RATIO} (ref 1.1–2.2)
ALP SERPL-CCNC: 96 U/L (ref 40–129)
ALT SERPL-CCNC: 16 U/L (ref 10–40)
ANION GAP SERPL CALCULATED.3IONS-SCNC: 13 MMOL/L (ref 3–16)
ANION GAP SERPL CALCULATED.3IONS-SCNC: 14 MMOL/L (ref 3–16)
AST SERPL-CCNC: 28 U/L (ref 15–37)
BASOPHILS # BLD: 0.1 K/UL (ref 0–0.2)
BASOPHILS NFR BLD: 1 %
BILIRUB SERPL-MCNC: <0.2 MG/DL (ref 0–1)
BUN SERPL-MCNC: 8 MG/DL (ref 7–20)
BUN SERPL-MCNC: 8 MG/DL (ref 7–20)
CALCIUM SERPL-MCNC: 9.2 MG/DL (ref 8.3–10.6)
CALCIUM SERPL-MCNC: 9.6 MG/DL (ref 8.3–10.6)
CHLORIDE SERPL-SCNC: 88 MMOL/L (ref 99–110)
CHLORIDE SERPL-SCNC: 90 MMOL/L (ref 99–110)
CO2 SERPL-SCNC: 24 MMOL/L (ref 21–32)
CO2 SERPL-SCNC: 26 MMOL/L (ref 21–32)
CREAT SERPL-MCNC: 0.6 MG/DL (ref 0.6–1.2)
CREAT SERPL-MCNC: 0.7 MG/DL (ref 0.6–1.2)
DEPRECATED RDW RBC AUTO: 13.2 % (ref 12.4–15.4)
EOSINOPHIL # BLD: 0 K/UL (ref 0–0.6)
EOSINOPHIL NFR BLD: 0 %
GFR SERPLBLD CREATININE-BSD FMLA CKD-EPI: >60 ML/MIN/{1.73_M2}
GFR SERPLBLD CREATININE-BSD FMLA CKD-EPI: >60 ML/MIN/{1.73_M2}
GLUCOSE SERPL-MCNC: 168 MG/DL (ref 70–99)
GLUCOSE SERPL-MCNC: 177 MG/DL (ref 70–99)
HCT VFR BLD AUTO: 37.6 % (ref 36–48)
HGB BLD-MCNC: 12.5 G/DL (ref 12–16)
INR PPP: 0.97 (ref 0.87–1.14)
LYMPHOCYTES # BLD: 1.5 K/UL (ref 1–5.1)
LYMPHOCYTES NFR BLD: 12 %
MCH RBC QN AUTO: 29 PG (ref 26–34)
MCHC RBC AUTO-ENTMCNC: 33.1 G/DL (ref 31–36)
MCV RBC AUTO: 87.6 FL (ref 80–100)
MONOCYTES # BLD: 0.6 K/UL (ref 0–1.3)
MONOCYTES NFR BLD: 5 %
NEUTROPHILS # BLD: 10.3 K/UL (ref 1.7–7.7)
NEUTROPHILS NFR BLD: 81 %
NEUTS BAND NFR BLD MANUAL: 1 % (ref 0–7)
NT-PROBNP SERPL-MCNC: 2186 PG/ML (ref 0–124)
PLATELET # BLD AUTO: 422 K/UL (ref 135–450)
PLATELET BLD QL SMEAR: ADEQUATE
PMV BLD AUTO: 7.3 FL (ref 5–10.5)
POTASSIUM SERPL-SCNC: 5 MMOL/L (ref 3.5–5.1)
POTASSIUM SERPL-SCNC: 5.2 MMOL/L (ref 3.5–5.1)
PROT SERPL-MCNC: 7.6 G/DL (ref 6.4–8.2)
PROTHROMBIN TIME: 12.8 SEC (ref 11.7–14.5)
RBC # BLD AUTO: 4.29 M/UL (ref 4–5.2)
RBC MORPH BLD: NORMAL
SLIDE REVIEW: ABNORMAL
SODIUM SERPL-SCNC: 126 MMOL/L (ref 136–145)
SODIUM SERPL-SCNC: 129 MMOL/L (ref 136–145)
WBC # BLD AUTO: 12.5 K/UL (ref 4–11)

## 2023-03-26 PROCEDURE — 71045 X-RAY EXAM CHEST 1 VIEW: CPT

## 2023-03-26 PROCEDURE — 6370000000 HC RX 637 (ALT 250 FOR IP): Performed by: FAMILY MEDICINE

## 2023-03-26 PROCEDURE — 6360000002 HC RX W HCPCS: Performed by: FAMILY MEDICINE

## 2023-03-26 PROCEDURE — 2700000000 HC OXYGEN THERAPY PER DAY

## 2023-03-26 PROCEDURE — 2580000003 HC RX 258: Performed by: FAMILY MEDICINE

## 2023-03-26 PROCEDURE — 83880 ASSAY OF NATRIURETIC PEPTIDE: CPT

## 2023-03-26 PROCEDURE — 1200000000 HC SEMI PRIVATE

## 2023-03-26 PROCEDURE — 93005 ELECTROCARDIOGRAM TRACING: CPT | Performed by: FAMILY MEDICINE

## 2023-03-26 PROCEDURE — 99285 EMERGENCY DEPT VISIT HI MDM: CPT

## 2023-03-26 PROCEDURE — 51702 INSERT TEMP BLADDER CATH: CPT

## 2023-03-26 PROCEDURE — 73700 CT LOWER EXTREMITY W/O DYE: CPT

## 2023-03-26 PROCEDURE — 93005 ELECTROCARDIOGRAM TRACING: CPT | Performed by: EMERGENCY MEDICINE

## 2023-03-26 PROCEDURE — 80053 COMPREHEN METABOLIC PANEL: CPT

## 2023-03-26 PROCEDURE — 94640 AIRWAY INHALATION TREATMENT: CPT

## 2023-03-26 PROCEDURE — 85610 PROTHROMBIN TIME: CPT

## 2023-03-26 PROCEDURE — 6370000000 HC RX 637 (ALT 250 FOR IP): Performed by: EMERGENCY MEDICINE

## 2023-03-26 PROCEDURE — 36415 COLL VENOUS BLD VENIPUNCTURE: CPT

## 2023-03-26 PROCEDURE — 94761 N-INVAS EAR/PLS OXIMETRY MLT: CPT

## 2023-03-26 PROCEDURE — P9047 ALBUMIN (HUMAN), 25%, 50ML: HCPCS | Performed by: FAMILY MEDICINE

## 2023-03-26 PROCEDURE — 70450 CT HEAD/BRAIN W/O DYE: CPT

## 2023-03-26 PROCEDURE — 99223 1ST HOSP IP/OBS HIGH 75: CPT | Performed by: ORTHOPAEDIC SURGERY

## 2023-03-26 PROCEDURE — 73502 X-RAY EXAM HIP UNI 2-3 VIEWS: CPT

## 2023-03-26 PROCEDURE — 85025 COMPLETE CBC W/AUTO DIFF WBC: CPT

## 2023-03-26 RX ORDER — SODIUM CHLORIDE 9 MG/ML
INJECTION, SOLUTION INTRAVENOUS CONTINUOUS
Status: DISCONTINUED | OUTPATIENT
Start: 2023-03-26 | End: 2023-03-26

## 2023-03-26 RX ORDER — ALBUMIN (HUMAN) 12.5 G/50ML
25 SOLUTION INTRAVENOUS ONCE
Status: COMPLETED | OUTPATIENT
Start: 2023-03-26 | End: 2023-03-26

## 2023-03-26 RX ORDER — METOPROLOL TARTRATE 50 MG/1
100 TABLET, FILM COATED ORAL 2 TIMES DAILY
Status: DISCONTINUED | OUTPATIENT
Start: 2023-03-26 | End: 2023-03-27

## 2023-03-26 RX ORDER — ACETAMINOPHEN 650 MG/1
650 SUPPOSITORY RECTAL EVERY 6 HOURS PRN
Status: DISCONTINUED | OUTPATIENT
Start: 2023-03-26 | End: 2023-03-29 | Stop reason: HOSPADM

## 2023-03-26 RX ORDER — HYDROCODONE BITARTRATE AND ACETAMINOPHEN 5; 325 MG/1; MG/1
1 TABLET ORAL ONCE
Status: COMPLETED | OUTPATIENT
Start: 2023-03-26 | End: 2023-03-26

## 2023-03-26 RX ORDER — POLYETHYLENE GLYCOL 3350 17 G/17G
17 POWDER, FOR SOLUTION ORAL DAILY PRN
Status: DISCONTINUED | OUTPATIENT
Start: 2023-03-26 | End: 2023-03-29 | Stop reason: HOSPADM

## 2023-03-26 RX ORDER — ENOXAPARIN SODIUM 100 MG/ML
40 INJECTION SUBCUTANEOUS DAILY
Status: DISCONTINUED | OUTPATIENT
Start: 2023-03-26 | End: 2023-03-29 | Stop reason: HOSPADM

## 2023-03-26 RX ORDER — SULFAMETHOXAZOLE AND TRIMETHOPRIM 800; 160 MG/1; MG/1
1 TABLET ORAL 2 TIMES DAILY
Status: ON HOLD | COMMUNITY
Start: 2023-03-24 | End: 2023-03-31

## 2023-03-26 RX ORDER — SODIUM CHLORIDE 9 MG/ML
INJECTION, SOLUTION INTRAVENOUS PRN
Status: DISCONTINUED | OUTPATIENT
Start: 2023-03-26 | End: 2023-03-29 | Stop reason: HOSPADM

## 2023-03-26 RX ORDER — ONDANSETRON 2 MG/ML
4 INJECTION INTRAMUSCULAR; INTRAVENOUS EVERY 6 HOURS PRN
Status: DISCONTINUED | OUTPATIENT
Start: 2023-03-26 | End: 2023-03-29 | Stop reason: HOSPADM

## 2023-03-26 RX ORDER — ONDANSETRON 4 MG/1
4 TABLET, ORALLY DISINTEGRATING ORAL EVERY 8 HOURS PRN
Status: DISCONTINUED | OUTPATIENT
Start: 2023-03-26 | End: 2023-03-29 | Stop reason: HOSPADM

## 2023-03-26 RX ORDER — SODIUM CHLORIDE 0.9 % (FLUSH) 0.9 %
5-40 SYRINGE (ML) INJECTION PRN
Status: DISCONTINUED | OUTPATIENT
Start: 2023-03-26 | End: 2023-03-29 | Stop reason: HOSPADM

## 2023-03-26 RX ORDER — ACETAMINOPHEN 325 MG/1
650 TABLET ORAL EVERY 6 HOURS PRN
Status: DISCONTINUED | OUTPATIENT
Start: 2023-03-26 | End: 2023-03-29 | Stop reason: HOSPADM

## 2023-03-26 RX ORDER — IPRATROPIUM BROMIDE AND ALBUTEROL SULFATE 2.5; .5 MG/3ML; MG/3ML
1 SOLUTION RESPIRATORY (INHALATION) ONCE
Status: COMPLETED | OUTPATIENT
Start: 2023-03-26 | End: 2023-03-26

## 2023-03-26 RX ORDER — SODIUM CHLORIDE 0.9 % (FLUSH) 0.9 %
5-40 SYRINGE (ML) INJECTION EVERY 12 HOURS SCHEDULED
Status: DISCONTINUED | OUTPATIENT
Start: 2023-03-26 | End: 2023-03-29 | Stop reason: HOSPADM

## 2023-03-26 RX ORDER — OXYCODONE HYDROCHLORIDE 5 MG/1
5 TABLET ORAL EVERY 4 HOURS PRN
Status: DISCONTINUED | OUTPATIENT
Start: 2023-03-26 | End: 2023-03-29 | Stop reason: HOSPADM

## 2023-03-26 RX ADMIN — ALBUMIN HUMAN 25 G: 0.25 SOLUTION INTRAVENOUS at 20:35

## 2023-03-26 RX ADMIN — METOPROLOL TARTRATE 100 MG: 50 TABLET ORAL at 17:01

## 2023-03-26 RX ADMIN — CEFTRIAXONE SODIUM 1000 MG: 1 INJECTION, POWDER, FOR SOLUTION INTRAMUSCULAR; INTRAVENOUS at 16:38

## 2023-03-26 RX ADMIN — IPRATROPIUM BROMIDE AND ALBUTEROL SULFATE 1 AMPULE: .5; 2.5 SOLUTION RESPIRATORY (INHALATION) at 09:58

## 2023-03-26 RX ADMIN — HYDROCODONE BITARTRATE AND ACETAMINOPHEN 1 TABLET: 5; 325 TABLET ORAL at 08:23

## 2023-03-26 RX ADMIN — ACETAMINOPHEN 650 MG: 325 TABLET ORAL at 14:11

## 2023-03-26 ASSESSMENT — PAIN SCALES - GENERAL
PAINLEVEL_OUTOF10: 4
PAINLEVEL_OUTOF10: 8

## 2023-03-26 ASSESSMENT — PAIN - FUNCTIONAL ASSESSMENT: PAIN_FUNCTIONAL_ASSESSMENT: PREVENTS OR INTERFERES SOME ACTIVE ACTIVITIES AND ADLS

## 2023-03-26 ASSESSMENT — PAIN DESCRIPTION - ORIENTATION
ORIENTATION: LEFT
ORIENTATION: LEFT

## 2023-03-26 ASSESSMENT — PAIN DESCRIPTION - FREQUENCY: FREQUENCY: CONTINUOUS

## 2023-03-26 ASSESSMENT — PAIN DESCRIPTION - PAIN TYPE: TYPE: ACUTE PAIN

## 2023-03-26 ASSESSMENT — PAIN DESCRIPTION - DESCRIPTORS: DESCRIPTORS: ACHING

## 2023-03-26 ASSESSMENT — PAIN DESCRIPTION - ONSET: ONSET: ON-GOING

## 2023-03-26 ASSESSMENT — PAIN DESCRIPTION - LOCATION
LOCATION: HIP
LOCATION: HIP

## 2023-03-26 NOTE — ED NOTES
Report given to [facility] RN. No further questions at this time. Pt awaiting transport to floor at this time.       Brenda Hyde RN  03/26/23 [id number]

## 2023-03-26 NOTE — ED PROVIDER NOTES
oxyCODONE (ROXICODONE) immediate release tablet 5 mg  5 mg Oral Q4H PRN Arnav Swain DO         Current Outpatient Medications   Medication Sig Dispense Refill    sulfamethoxazole-trimethoprim (BACTRIM DS;SEPTRA DS) 800-160 MG per tablet Take 1 tablet by mouth 2 times daily      lisinopril (PRINIVIL;ZESTRIL) 20 MG tablet Take 1 tablet by mouth 2 times daily Indications: will start after today's visit 60 tablet 0    apixaban (ELIQUIS) 5 MG TABS tablet Take 1 tablet by mouth 2 times daily 60 tablet 1    metoprolol tartrate (LOPRESSOR) 50 MG tablet Take 1 tablet by mouth 2 times daily (Patient taking differently: Take 100 mg by mouth 2 times daily) 60 tablet 2    famotidine (PEPCID) 20 MG tablet Take 1 tablet by mouth 2 times daily 60 tablet 3    magnesium oxide (MAG-OX) 400 MG tablet Take 1 tablet by mouth daily 30 tablet 1    ferrous sulfate 325 (65 FE) MG tablet Take 325 mg by mouth daily (with breakfast). aspirin 81 MG tablet Take 1 tablet by mouth daily. 30 tablet 6    calcium carbonate (OSCAL) 500 MG TABS tablet Take 600 mg by mouth 2 times daily. lovastatin (MEVACOR) 20 MG tablet Take 20 mg by mouth nightly. metformin (GLUCOPHAGE) 850 MG tablet Take 850 mg by mouth 2 times daily (with meals). Multiple Vitamin (MULTIVITAMIN PO) Take 1 tablet by mouth daily. vitamin B-12 (CYANOCOBALAMIN) 1000 MCG tablet Take 500 mcg by mouth daily. Allergies   Allergen Reactions    Codeine Rash    Demerol Rash       PHYSICAL EXAM  BP (!) 134/47   Pulse 76   Temp 98.9 °F (37.2 °C) (Oral)   Resp 16   Wt 164 lb (74.4 kg)   SpO2 99%   BMI 32.03 kg/m²    GENERAL APPEARANCE: Awake and alert. Cooperative. No acute distress. HENT: Normocephalic. Atraumatic. Mucous membranes are moist.  No drooling or stridor. No cephalhematomas. No drew sign or raccoon's eyes. NECK: Supple. No central C-spine bony tenderness or step-offs. EYES: PERRL. EOM's grossly intact. HEART/CHEST: RRR.

## 2023-03-26 NOTE — H&P
Hospital Medicine History & Physical      PCP: Kasandra Sebastian    Date of Admission: 3/26/2023    Impression: This patient is a 72-year-old female medical history significant for CAD DM2 HTN HLD CVA. She presents after a fall at home with subsequent femoral neck fracture. A/P:    1. Ground-level fall with subsequent acute basicervical left femoral neck fracture: Orthopedic surgical service consult. Pain control PT OT. Operative intervention at the discretion of the orthopedic surgical service. Possibly tomorrow 3/27. N.p.o. after midnight. 2.  Dizziness: This is been an ongoing issue for the patient she states she had been given meclizine with some relief. cont IVF. 3. DM2: SSI    4. HTN: Home meds    5. Recent UTI: patient tells me she had only taken 1 dose of outpt abx for UTI. Will place her on rocephin. Code status: Full code    DVT prophylaxis: Lovenox subcu holding home Eliquis for now restart when okay with surgical service    Transition of care [facility]    Anticipated date of discharge: Possibly 2 to 3 days    24-hour care plan:    Pain control PT OT surgical intervention at the discretion of the orthopedic service, patient is likely mod-high risk given her history, however considering morbidity and mortality of hip fracture benefit likely outweighs risk and from medical perspective will give IVF and patient ought be medically appropriate to proceed to OR tomorrow if so determined by surgery. If the patient's dizziness persists beyond orthostasis we will check echocardiogram. Abx for uti    Chief complaint: Hip pain fall at home    HPI:    72-year-old female medical history significant for CAD DM2 HTN HLD CVA. She presents after a fall at home with subsequent femoral neck fracture.   She tells me that her \"dizziness\" and weakness have been worse over the past week, she had been diagnosed by her pcp in the last few days with UTI which may or may not explain some of

## 2023-03-27 ENCOUNTER — APPOINTMENT (OUTPATIENT)
Dept: GENERAL RADIOLOGY | Age: 73
DRG: 522 | End: 2023-03-27
Payer: MEDICARE

## 2023-03-27 ENCOUNTER — ANESTHESIA (OUTPATIENT)
Dept: OPERATING ROOM | Age: 73
DRG: 522 | End: 2023-03-27
Payer: MEDICARE

## 2023-03-27 ENCOUNTER — ANESTHESIA EVENT (OUTPATIENT)
Dept: OPERATING ROOM | Age: 73
DRG: 522 | End: 2023-03-27
Payer: MEDICARE

## 2023-03-27 LAB
ALBUMIN SERPL-MCNC: 4.3 G/DL (ref 3.4–5)
ALBUMIN/GLOB SERPL: 1.4 {RATIO} (ref 1.1–2.2)
ALP SERPL-CCNC: 82 U/L (ref 40–129)
ALT SERPL-CCNC: 12 U/L (ref 10–40)
ANION GAP SERPL CALCULATED.3IONS-SCNC: 14 MMOL/L (ref 3–16)
AST SERPL-CCNC: 16 U/L (ref 15–37)
BASOPHILS # BLD: 0.1 K/UL (ref 0–0.2)
BASOPHILS NFR BLD: 0.7 %
BILIRUB SERPL-MCNC: 0.3 MG/DL (ref 0–1)
BUN SERPL-MCNC: 8 MG/DL (ref 7–20)
CALCIUM SERPL-MCNC: 9.6 MG/DL (ref 8.3–10.6)
CHLORIDE SERPL-SCNC: 92 MMOL/L (ref 99–110)
CO2 SERPL-SCNC: 23 MMOL/L (ref 21–32)
CREAT SERPL-MCNC: 0.5 MG/DL (ref 0.6–1.2)
DEPRECATED RDW RBC AUTO: 13.1 % (ref 12.4–15.4)
EKG ATRIAL RATE: 77 BPM
EKG ATRIAL RATE: 96 BPM
EKG DIAGNOSIS: NORMAL
EKG DIAGNOSIS: NORMAL
EKG P AXIS: 73 DEGREES
EKG P-R INTERVAL: 136 MS
EKG Q-T INTERVAL: 348 MS
EKG Q-T INTERVAL: 380 MS
EKG QRS DURATION: 70 MS
EKG QRS DURATION: 74 MS
EKG QTC CALCULATION (BAZETT): 430 MS
EKG QTC CALCULATION (BAZETT): 439 MS
EKG R AXIS: 1 DEGREES
EKG R AXIS: 4 DEGREES
EKG T AXIS: 24 DEGREES
EKG T AXIS: 80 DEGREES
EKG VENTRICULAR RATE: 77 BPM
EKG VENTRICULAR RATE: 96 BPM
EOSINOPHIL # BLD: 0 K/UL (ref 0–0.6)
EOSINOPHIL NFR BLD: 0.2 %
GFR SERPLBLD CREATININE-BSD FMLA CKD-EPI: >60 ML/MIN/{1.73_M2}
GLUCOSE BLD-MCNC: 124 MG/DL (ref 70–99)
GLUCOSE BLD-MCNC: 156 MG/DL (ref 70–99)
GLUCOSE SERPL-MCNC: 124 MG/DL (ref 70–99)
HCT VFR BLD AUTO: 36.8 % (ref 36–48)
HGB BLD-MCNC: 12.2 G/DL (ref 12–16)
LYMPHOCYTES # BLD: 1 K/UL (ref 1–5.1)
LYMPHOCYTES NFR BLD: 12.4 %
MCH RBC QN AUTO: 28.9 PG (ref 26–34)
MCHC RBC AUTO-ENTMCNC: 33.3 G/DL (ref 31–36)
MCV RBC AUTO: 86.7 FL (ref 80–100)
MONOCYTES # BLD: 0.5 K/UL (ref 0–1.3)
MONOCYTES NFR BLD: 6.9 %
NEUTROPHILS # BLD: 6.3 K/UL (ref 1.7–7.7)
NEUTROPHILS NFR BLD: 79.8 %
PERFORMED ON: ABNORMAL
PERFORMED ON: ABNORMAL
PLATELET # BLD AUTO: 337 K/UL (ref 135–450)
PMV BLD AUTO: 6.5 FL (ref 5–10.5)
POTASSIUM SERPL-SCNC: 4.6 MMOL/L (ref 3.5–5.1)
PROT SERPL-MCNC: 7.4 G/DL (ref 6.4–8.2)
RBC # BLD AUTO: 4.24 M/UL (ref 4–5.2)
SODIUM SERPL-SCNC: 129 MMOL/L (ref 136–145)
WBC # BLD AUTO: 7.9 K/UL (ref 4–11)

## 2023-03-27 PROCEDURE — 73501 X-RAY EXAM HIP UNI 1 VIEW: CPT

## 2023-03-27 PROCEDURE — 0SRB0JZ REPLACEMENT OF LEFT HIP JOINT WITH SYNTHETIC SUBSTITUTE, OPEN APPROACH: ICD-10-PCS | Performed by: ORTHOPAEDIC SURGERY

## 2023-03-27 PROCEDURE — 6360000002 HC RX W HCPCS: Performed by: ORTHOPAEDIC SURGERY

## 2023-03-27 PROCEDURE — 1200000000 HC SEMI PRIVATE

## 2023-03-27 PROCEDURE — 36415 COLL VENOUS BLD VENIPUNCTURE: CPT

## 2023-03-27 PROCEDURE — 2500000003 HC RX 250 WO HCPCS: Performed by: NURSE ANESTHETIST, CERTIFIED REGISTERED

## 2023-03-27 PROCEDURE — 93010 ELECTROCARDIOGRAM REPORT: CPT | Performed by: INTERNAL MEDICINE

## 2023-03-27 PROCEDURE — 85025 COMPLETE CBC W/AUTO DIFF WBC: CPT

## 2023-03-27 PROCEDURE — 80053 COMPREHEN METABOLIC PANEL: CPT

## 2023-03-27 PROCEDURE — 6370000000 HC RX 637 (ALT 250 FOR IP): Performed by: NURSE PRACTITIONER

## 2023-03-27 PROCEDURE — 2500000003 HC RX 250 WO HCPCS: Performed by: ORTHOPAEDIC SURGERY

## 2023-03-27 PROCEDURE — 2709999900 HC NON-CHARGEABLE SUPPLY: Performed by: ORTHOPAEDIC SURGERY

## 2023-03-27 PROCEDURE — 3700000000 HC ANESTHESIA ATTENDED CARE: Performed by: ORTHOPAEDIC SURGERY

## 2023-03-27 PROCEDURE — 3700000001 HC ADD 15 MINUTES (ANESTHESIA): Performed by: ORTHOPAEDIC SURGERY

## 2023-03-27 PROCEDURE — 6360000002 HC RX W HCPCS: Performed by: FAMILY MEDICINE

## 2023-03-27 PROCEDURE — 2580000003 HC RX 258: Performed by: FAMILY MEDICINE

## 2023-03-27 PROCEDURE — 3600000015 HC SURGERY LEVEL 5 ADDTL 15MIN: Performed by: ORTHOPAEDIC SURGERY

## 2023-03-27 PROCEDURE — 7100000000 HC PACU RECOVERY - FIRST 15 MIN: Performed by: ORTHOPAEDIC SURGERY

## 2023-03-27 PROCEDURE — 2580000003 HC RX 258: Performed by: NURSE ANESTHETIST, CERTIFIED REGISTERED

## 2023-03-27 PROCEDURE — 2720000010 HC SURG SUPPLY STERILE: Performed by: ORTHOPAEDIC SURGERY

## 2023-03-27 PROCEDURE — 6360000002 HC RX W HCPCS: Performed by: NURSE ANESTHETIST, CERTIFIED REGISTERED

## 2023-03-27 PROCEDURE — C1776 JOINT DEVICE (IMPLANTABLE): HCPCS | Performed by: ORTHOPAEDIC SURGERY

## 2023-03-27 PROCEDURE — A4217 STERILE WATER/SALINE, 500 ML: HCPCS | Performed by: ORTHOPAEDIC SURGERY

## 2023-03-27 PROCEDURE — 6370000000 HC RX 637 (ALT 250 FOR IP): Performed by: FAMILY MEDICINE

## 2023-03-27 PROCEDURE — 3600000005 HC SURGERY LEVEL 5 BASE: Performed by: ORTHOPAEDIC SURGERY

## 2023-03-27 PROCEDURE — 6360000002 HC RX W HCPCS: Performed by: ANESTHESIOLOGY

## 2023-03-27 PROCEDURE — 2580000003 HC RX 258: Performed by: ORTHOPAEDIC SURGERY

## 2023-03-27 PROCEDURE — 7100000001 HC PACU RECOVERY - ADDTL 15 MIN: Performed by: ORTHOPAEDIC SURGERY

## 2023-03-27 PROCEDURE — 72170 X-RAY EXAM OF PELVIS: CPT

## 2023-03-27 DEVICE — SCREW BNE L20MM DIA6.5MM ACET HIP TI ST TRIL: Type: IMPLANTABLE DEVICE | Site: HIP | Status: FUNCTIONAL

## 2023-03-27 DEVICE — IMPLANTABLE DEVICE
Type: IMPLANTABLE DEVICE | Site: HIP | Status: FUNCTIONAL
Brand: VIVACIT-E®

## 2023-03-27 DEVICE — IMPLANTABLE DEVICE
Type: IMPLANTABLE DEVICE | Site: HIP | Status: FUNCTIONAL
Brand: G7® DUAL MOBILITY ACETABULAR SYSTEM

## 2023-03-27 DEVICE — IMPLANTABLE DEVICE: Type: IMPLANTABLE DEVICE | Site: HIP | Status: FUNCTIONAL

## 2023-03-27 DEVICE — AVENIR COMPLETE STANDARD COLLARED SIZE 5: Type: IMPLANTABLE DEVICE | Site: HIP | Status: FUNCTIONAL

## 2023-03-27 DEVICE — IMPLANTABLE DEVICE
Type: IMPLANTABLE DEVICE | Site: HIP | Status: FUNCTIONAL
Brand: G7® ACETABULAR SYSTEM

## 2023-03-27 DEVICE — BIOLOX® DELTA HEAD, 12/14, 28 X -3.5
Type: IMPLANTABLE DEVICE | Site: HIP | Status: FUNCTIONAL
Brand: BIOLOX® DELTA

## 2023-03-27 RX ORDER — HYDROMORPHONE HCL 110MG/55ML
0.25 PATIENT CONTROLLED ANALGESIA SYRINGE INTRAVENOUS EVERY 5 MIN PRN
Status: DISCONTINUED | OUTPATIENT
Start: 2023-03-27 | End: 2023-03-27 | Stop reason: HOSPADM

## 2023-03-27 RX ORDER — HYDROMORPHONE HCL 110MG/55ML
0.5 PATIENT CONTROLLED ANALGESIA SYRINGE INTRAVENOUS EVERY 5 MIN PRN
Status: DISCONTINUED | OUTPATIENT
Start: 2023-03-27 | End: 2023-03-27 | Stop reason: HOSPADM

## 2023-03-27 RX ORDER — DEXAMETHASONE SODIUM PHOSPHATE 4 MG/ML
INJECTION, SOLUTION INTRA-ARTICULAR; INTRALESIONAL; INTRAMUSCULAR; INTRAVENOUS; SOFT TISSUE PRN
Status: DISCONTINUED | OUTPATIENT
Start: 2023-03-27 | End: 2023-03-27 | Stop reason: SDUPTHER

## 2023-03-27 RX ORDER — KETAMINE HYDROCHLORIDE 10 MG/ML
INJECTION INTRAMUSCULAR; INTRAVENOUS PRN
Status: DISCONTINUED | OUTPATIENT
Start: 2023-03-27 | End: 2023-03-27 | Stop reason: SDUPTHER

## 2023-03-27 RX ORDER — ACETAMINOPHEN 500 MG
1000 TABLET ORAL 3 TIMES DAILY
Status: DISCONTINUED | OUTPATIENT
Start: 2023-03-27 | End: 2023-03-29 | Stop reason: HOSPADM

## 2023-03-27 RX ORDER — GLYCOPYRROLATE 0.2 MG/ML
INJECTION INTRAMUSCULAR; INTRAVENOUS PRN
Status: DISCONTINUED | OUTPATIENT
Start: 2023-03-27 | End: 2023-03-27 | Stop reason: SDUPTHER

## 2023-03-27 RX ORDER — VANCOMYCIN HYDROCHLORIDE 1 G/20ML
INJECTION, POWDER, LYOPHILIZED, FOR SOLUTION INTRAVENOUS
Status: COMPLETED | OUTPATIENT
Start: 2023-03-27 | End: 2023-03-27

## 2023-03-27 RX ORDER — FAMOTIDINE 10 MG/ML
INJECTION, SOLUTION INTRAVENOUS PRN
Status: DISCONTINUED | OUTPATIENT
Start: 2023-03-27 | End: 2023-03-27 | Stop reason: SDUPTHER

## 2023-03-27 RX ORDER — SODIUM CHLORIDE 9 MG/ML
INJECTION, SOLUTION INTRAVENOUS CONTINUOUS PRN
Status: DISCONTINUED | OUTPATIENT
Start: 2023-03-27 | End: 2023-03-27 | Stop reason: SDUPTHER

## 2023-03-27 RX ORDER — SODIUM CHLORIDE 0.9 % (FLUSH) 0.9 %
5-40 SYRINGE (ML) INJECTION PRN
Status: DISCONTINUED | OUTPATIENT
Start: 2023-03-27 | End: 2023-03-27 | Stop reason: HOSPADM

## 2023-03-27 RX ORDER — SODIUM CHLORIDE 0.9 % (FLUSH) 0.9 %
5-40 SYRINGE (ML) INJECTION EVERY 12 HOURS SCHEDULED
Status: DISCONTINUED | OUTPATIENT
Start: 2023-03-27 | End: 2023-03-27 | Stop reason: HOSPADM

## 2023-03-27 RX ORDER — ALBUMIN (HUMAN) 12.5 G/50ML
25 SOLUTION INTRAVENOUS ONCE
Status: DISCONTINUED | OUTPATIENT
Start: 2023-03-27 | End: 2023-03-27

## 2023-03-27 RX ORDER — ONDANSETRON 2 MG/ML
4 INJECTION INTRAMUSCULAR; INTRAVENOUS
Status: DISCONTINUED | OUTPATIENT
Start: 2023-03-27 | End: 2023-03-27 | Stop reason: HOSPADM

## 2023-03-27 RX ORDER — LABETALOL HYDROCHLORIDE 5 MG/ML
5 INJECTION, SOLUTION INTRAVENOUS
Status: DISCONTINUED | OUTPATIENT
Start: 2023-03-27 | End: 2023-03-27 | Stop reason: HOSPADM

## 2023-03-27 RX ORDER — SUCCINYLCHOLINE CHLORIDE 20 MG/ML
INJECTION INTRAMUSCULAR; INTRAVENOUS PRN
Status: DISCONTINUED | OUTPATIENT
Start: 2023-03-27 | End: 2023-03-27 | Stop reason: SDUPTHER

## 2023-03-27 RX ORDER — PROPOFOL 10 MG/ML
INJECTION, EMULSION INTRAVENOUS PRN
Status: DISCONTINUED | OUTPATIENT
Start: 2023-03-27 | End: 2023-03-27 | Stop reason: SDUPTHER

## 2023-03-27 RX ORDER — SODIUM CHLORIDE 9 MG/ML
INJECTION, SOLUTION INTRAVENOUS PRN
Status: DISCONTINUED | OUTPATIENT
Start: 2023-03-27 | End: 2023-03-27 | Stop reason: HOSPADM

## 2023-03-27 RX ORDER — LIDOCAINE HYDROCHLORIDE 10 MG/ML
1 INJECTION, SOLUTION EPIDURAL; INFILTRATION; INTRACAUDAL; PERINEURAL
Status: DISCONTINUED | OUTPATIENT
Start: 2023-03-27 | End: 2023-03-27 | Stop reason: HOSPADM

## 2023-03-27 RX ORDER — MAGNESIUM SULFATE HEPTAHYDRATE 500 MG/ML
INJECTION, SOLUTION INTRAMUSCULAR; INTRAVENOUS PRN
Status: DISCONTINUED | OUTPATIENT
Start: 2023-03-27 | End: 2023-03-27 | Stop reason: SDUPTHER

## 2023-03-27 RX ORDER — METOPROLOL TARTRATE 50 MG/1
100 TABLET, FILM COATED ORAL 3 TIMES DAILY
Status: DISCONTINUED | OUTPATIENT
Start: 2023-03-27 | End: 2023-03-29 | Stop reason: HOSPADM

## 2023-03-27 RX ORDER — MAGNESIUM HYDROXIDE 1200 MG/15ML
LIQUID ORAL CONTINUOUS PRN
Status: COMPLETED | OUTPATIENT
Start: 2023-03-27 | End: 2023-03-27

## 2023-03-27 RX ORDER — FENTANYL CITRATE 50 UG/ML
INJECTION, SOLUTION INTRAMUSCULAR; INTRAVENOUS PRN
Status: DISCONTINUED | OUTPATIENT
Start: 2023-03-27 | End: 2023-03-27 | Stop reason: SDUPTHER

## 2023-03-27 RX ORDER — TRANEXAMIC ACID 10 MG/ML
1000 INJECTION, SOLUTION INTRAVENOUS ONCE
Status: COMPLETED | OUTPATIENT
Start: 2023-03-27 | End: 2023-03-27

## 2023-03-27 RX ORDER — LIDOCAINE HYDROCHLORIDE 20 MG/ML
INJECTION, SOLUTION INFILTRATION; PERINEURAL PRN
Status: DISCONTINUED | OUTPATIENT
Start: 2023-03-27 | End: 2023-03-27 | Stop reason: SDUPTHER

## 2023-03-27 RX ORDER — 0.9 % SODIUM CHLORIDE 0.9 %
250 INTRAVENOUS SOLUTION INTRAVENOUS ONCE
Status: COMPLETED | OUTPATIENT
Start: 2023-03-27 | End: 2023-03-27

## 2023-03-27 RX ORDER — ROCURONIUM BROMIDE 10 MG/ML
INJECTION, SOLUTION INTRAVENOUS PRN
Status: DISCONTINUED | OUTPATIENT
Start: 2023-03-27 | End: 2023-03-27 | Stop reason: SDUPTHER

## 2023-03-27 RX ORDER — ONDANSETRON 2 MG/ML
INJECTION INTRAMUSCULAR; INTRAVENOUS PRN
Status: DISCONTINUED | OUTPATIENT
Start: 2023-03-27 | End: 2023-03-27 | Stop reason: SDUPTHER

## 2023-03-27 RX ORDER — SODIUM CHLORIDE 9 MG/ML
INJECTION, SOLUTION INTRAVENOUS CONTINUOUS
Status: DISCONTINUED | OUTPATIENT
Start: 2023-03-27 | End: 2023-03-27

## 2023-03-27 RX ADMIN — PHENYLEPHRINE HYDROCHLORIDE 100 MCG: 10 INJECTION INTRAVENOUS at 14:30

## 2023-03-27 RX ADMIN — FENTANYL CITRATE 50 MCG: 50 INJECTION, SOLUTION INTRAMUSCULAR; INTRAVENOUS at 12:58

## 2023-03-27 RX ADMIN — SODIUM CHLORIDE: 9 INJECTION, SOLUTION INTRAVENOUS at 12:50

## 2023-03-27 RX ADMIN — FENTANYL CITRATE 50 MCG: 50 INJECTION, SOLUTION INTRAMUSCULAR; INTRAVENOUS at 13:10

## 2023-03-27 RX ADMIN — SODIUM CHLORIDE 250 ML: 9 INJECTION, SOLUTION INTRAVENOUS at 10:04

## 2023-03-27 RX ADMIN — ACETAMINOPHEN 650 MG: 325 TABLET ORAL at 01:42

## 2023-03-27 RX ADMIN — PROPOFOL 20 MG: 10 INJECTION, EMULSION INTRAVENOUS at 14:25

## 2023-03-27 RX ADMIN — PHENYLEPHRINE HYDROCHLORIDE 100 MCG: 10 INJECTION INTRAVENOUS at 13:17

## 2023-03-27 RX ADMIN — ONDANSETRON 4 MG: 2 INJECTION INTRAMUSCULAR; INTRAVENOUS at 13:11

## 2023-03-27 RX ADMIN — BUPIVACAINE HYDROCHLORIDE AND EPINEPHRINE BITARTRATE: 2.5; .005 INJECTION, SOLUTION EPIDURAL; INFILTRATION; INTRACAUDAL; PERINEURAL at 13:44

## 2023-03-27 RX ADMIN — METOPROLOL TARTRATE 100 MG: 50 TABLET ORAL at 20:42

## 2023-03-27 RX ADMIN — LIDOCAINE HYDROCHLORIDE 100 MG: 20 INJECTION, SOLUTION INFILTRATION; PERINEURAL at 12:58

## 2023-03-27 RX ADMIN — KETAMINE HYDROCHLORIDE 10 MG: 10 INJECTION, SOLUTION INTRAMUSCULAR; INTRAVENOUS at 13:38

## 2023-03-27 RX ADMIN — FAMOTIDINE 20 MG: 10 INJECTION INTRAVENOUS at 13:10

## 2023-03-27 RX ADMIN — GLYCOPYRROLATE 0.2 MG: 0.2 INJECTION, SOLUTION INTRAMUSCULAR; INTRAVENOUS at 13:10

## 2023-03-27 RX ADMIN — PHENYLEPHRINE HYDROCHLORIDE 100 MCG: 10 INJECTION INTRAVENOUS at 14:14

## 2023-03-27 RX ADMIN — HYDROMORPHONE HYDROCHLORIDE 0.5 MG: 2 INJECTION, SOLUTION INTRAMUSCULAR; INTRAVENOUS; SUBCUTANEOUS at 15:07

## 2023-03-27 RX ADMIN — KETAMINE HYDROCHLORIDE 10 MG: 10 INJECTION, SOLUTION INTRAMUSCULAR; INTRAVENOUS at 14:05

## 2023-03-27 RX ADMIN — PHENYLEPHRINE HYDROCHLORIDE 100 MCG: 10 INJECTION INTRAVENOUS at 13:52

## 2023-03-27 RX ADMIN — KETAMINE HYDROCHLORIDE 10 MG: 10 INJECTION, SOLUTION INTRAMUSCULAR; INTRAVENOUS at 13:24

## 2023-03-27 RX ADMIN — SUCCINYLCHOLINE CHLORIDE 120 MG: 20 INJECTION, SOLUTION INTRAMUSCULAR; INTRAVENOUS at 12:58

## 2023-03-27 RX ADMIN — SUGAMMADEX 200 MG: 100 INJECTION, SOLUTION INTRAVENOUS at 14:23

## 2023-03-27 RX ADMIN — KETAMINE HYDROCHLORIDE 10 MG: 10 INJECTION, SOLUTION INTRAMUSCULAR; INTRAVENOUS at 13:51

## 2023-03-27 RX ADMIN — CEFTRIAXONE SODIUM 1000 MG: 1 INJECTION, POWDER, FOR SOLUTION INTRAMUSCULAR; INTRAVENOUS at 16:43

## 2023-03-27 RX ADMIN — MAGNESIUM SULFATE HEPTAHYDRATE 1 G: 500 INJECTION, SOLUTION INTRAMUSCULAR; INTRAVENOUS at 13:19

## 2023-03-27 RX ADMIN — ROCURONIUM BROMIDE 40 MG: 10 INJECTION, SOLUTION INTRAVENOUS at 13:11

## 2023-03-27 RX ADMIN — KETAMINE HYDROCHLORIDE 10 MG: 10 INJECTION, SOLUTION INTRAMUSCULAR; INTRAVENOUS at 14:21

## 2023-03-27 RX ADMIN — ROCURONIUM BROMIDE 30 MG: 10 INJECTION, SOLUTION INTRAVENOUS at 13:59

## 2023-03-27 RX ADMIN — ROCURONIUM BROMIDE 10 MG: 10 INJECTION, SOLUTION INTRAVENOUS at 13:00

## 2023-03-27 RX ADMIN — PHENYLEPHRINE HYDROCHLORIDE 100 MCG: 10 INJECTION INTRAVENOUS at 13:44

## 2023-03-27 RX ADMIN — PHENYLEPHRINE HYDROCHLORIDE 100 MCG: 10 INJECTION INTRAVENOUS at 14:08

## 2023-03-27 RX ADMIN — DEXAMETHASONE SODIUM PHOSPHATE 8 MG: 4 INJECTION, SOLUTION INTRAMUSCULAR; INTRAVENOUS at 13:11

## 2023-03-27 RX ADMIN — PROPOFOL 100 MG: 10 INJECTION, EMULSION INTRAVENOUS at 12:58

## 2023-03-27 RX ADMIN — TRANEXAMIC ACID 1000 MG: 10 INJECTION, SOLUTION INTRAVENOUS at 12:55

## 2023-03-27 RX ADMIN — METOPROLOL TARTRATE 100 MG: 50 TABLET ORAL at 08:18

## 2023-03-27 RX ADMIN — ACETAMINOPHEN 1000 MG: 500 TABLET ORAL at 20:42

## 2023-03-27 RX ADMIN — SODIUM CHLORIDE: 9 INJECTION, SOLUTION INTRAVENOUS at 16:38

## 2023-03-27 RX ADMIN — ACETAMINOPHEN 1000 MG: 500 TABLET ORAL at 16:29

## 2023-03-27 ASSESSMENT — PAIN DESCRIPTION - PAIN TYPE
TYPE: ACUTE PAIN
TYPE: SURGICAL PAIN

## 2023-03-27 ASSESSMENT — PAIN DESCRIPTION - ORIENTATION
ORIENTATION: LEFT

## 2023-03-27 ASSESSMENT — LIFESTYLE VARIABLES: SMOKING_STATUS: 1

## 2023-03-27 ASSESSMENT — PAIN SCALES - GENERAL
PAINLEVEL_OUTOF10: 7
PAINLEVEL_OUTOF10: 10
PAINLEVEL_OUTOF10: 0
PAINLEVEL_OUTOF10: 8

## 2023-03-27 ASSESSMENT — PAIN DESCRIPTION - DESCRIPTORS: DESCRIPTORS: ACHING

## 2023-03-27 ASSESSMENT — PAIN - FUNCTIONAL ASSESSMENT: PAIN_FUNCTIONAL_ASSESSMENT: NONE - DENIES PAIN

## 2023-03-27 ASSESSMENT — PAIN DESCRIPTION - LOCATION
LOCATION: HIP

## 2023-03-27 ASSESSMENT — PAIN DESCRIPTION - FREQUENCY: FREQUENCY: CONTINUOUS

## 2023-03-27 NOTE — ANESTHESIA POSTPROCEDURE EVALUATION
Department of Anesthesiology  Postprocedure Note    Patient: Zunilda Vences  MRN: [id number]  YOB: 1950  Date of evaluation: 3/27/2023      Procedure Summary     Date: 03/27/23 Room / Location: [address]    Anesthesia Start: 4875 Anesthesia Stop: 7173    Procedure: LEFT HIP TOTAL ARTHROPLASTY ANTERIOR APPROACH (Left: Hip) Diagnosis:       Arthritis of left hip      (ARTHRITIS OF LEFT HIP)    Surgeons: Kell Joseph MD Responsible Provider: Ellamae Aschoff, MD    Anesthesia Type: general ASA Status: 3          Anesthesia Type: No value filed.     El Phase I: El Score: 9    El Phase II:        Anesthesia Post Evaluation    Patient location during evaluation: PACU  Patient participation: complete - patient participated  Level of consciousness: awake and alert  Airway patency: patent  Nausea & Vomiting: no nausea and no vomiting  Complications: no  Cardiovascular status: blood pressure returned to baseline  Respiratory status: acceptable  Hydration status: euvolemic  Multimodal analgesia pain management approach

## 2023-03-27 NOTE — ANESTHESIA PRE PROCEDURE
normal LA pressures. The aortic valve is mildly thickened/calcified but opens well with normal   gradients. Mild tricuspid regurgitation with a PASP of 33 mmHg. A bubble study was performed and fails to show evidence of shunting. Signature      ------------------------------------------------------------------   Electronically signed by Allie Mcardle MD, [facility] (Interpreting   physician) on 12/13/2021 at 04:59 PM   ------------------------------------------------------------------                Neuro/Psych:   (+) CVA:,              ROS comment: H/o dizziness GI/Hepatic/Renal:        (-) GERD      ROS comment: Recent UTI  Hip fx  H/o gastric bypass. Endo/Other:    (+) Diabetes, blood dyscrasia (Eliquis taken 7 am 3/25/23): anticoagulation therapy:., .                 Abdominal:             Vascular:   + PVD, aortic or cerebral, . Other Findings:           Anesthesia Plan      general     ASA 3       Induction: intravenous. MIPS: Postoperative opioids intended and Prophylactic antiemetics administered. Anesthetic plan and risks discussed with patient. Plan discussed with CRNA.                     Shamika Rod MD   3/27/2023

## 2023-03-27 NOTE — PLAN OF CARE
[facility] [address] Surgery  Plan of Care Note        Patient seen sitting up in bed. Pain is controlled as long as she does not reposition. She denies numbness or tingling in her left leg. Xray reviewed, and showed displaced femoral neck fracture. Hx PAF on Eliquis at home. Lives alone in ranch style home - no assistive devices to ambulate. Plan:  -Proceed with left total hip arthroplasty for fracture today with Dr. Kriss Ruffin 12:30 PM  - NPO   - NWB RLE  - Verify informed consent  -Appreciate pre-op clearance from hospitalist   -Plan to resume Eliquis 2.5 mg tomorrow, 3/28. She may then resume Eliquis 5 mg twice daily on 3/30 if Hgb stable.     ИВАН Duque - CNP 3/27/2023 12:26 PM

## 2023-03-27 NOTE — DISCHARGE INSTRUCTIONS
Organization by going to: [email or web address]. To find a different doctor, visit [address] Physician Compare website, [email or web address], or call 1-800-MEDICARE (8-661.891.6772). TTY users should call [phone]. To find a different hospital, visit JUNTA.CL, or  call 1-800-MEDICARE (1- 141.752.1146). TTY users should call  3-519.379.2358. To find a different skilled nursing facility, visit [employer]  website, [email or web address] or call  1-800-MEDICARE (7-886.771.8124). TTY users should call [phone]- [phone]. To find a different home health agency, visit [address] [place] [address] website, Gliph.uk, or call 1-800-MEDICARE (2-453.399.1645). TTY users should call 8-604-244- [phone]. For an explanation of how patients can access their health care records and beneficiary claims data, please visit [email or web address]- [email or web address]    Get more information     If you have questions or want more information about the Comprehensive Care for Joint Replacement (CJR) model, call [facility] at [phone] or call 1-800-MEDICARE. You can also find additional information at https://innovation.cms.gov/initiatives/cjr.

## 2023-03-27 NOTE — BRIEF OP NOTE
Brief Postoperative Note      Patient: Larry Franklin  YOB: 1950  MRN: [id number]    Date of Procedure: 3/27/2023    Pre-Op Diagnosis: Left femoral neck fracture    Post-Op Diagnosis: Same       Procedure(s):  LEFT HIP TOTAL ARTHROPLASTY ANTERIOR APPROACH    Surgeon(s):  Jennie Nino MD    Assistant:  Surgical Assistant: Mayito Up    Anesthesia: General    Estimated Blood Loss (mL): less than 133     Complications: None    Specimens:   * No specimens in log *    Implants:  Implant Name Type Inv. Item Serial No.  Lot No. LRB No. Used Action   SHELL G7 OSSEOTI 3 HOLE 48MM C - [id number]  SHELL G7 OSSEOTI 3 HOLE 48MM C  [facility] [id number] Left 1 Implanted   G7 DUAL MOBILITY LINER 38MM C - [id number]  G7 DUAL MOBILITY LINER 38MM C  [employer]- [id number] Left 1 Implanted   BONE SCREW 6.5X30 SELF-TAP - [id number]  BONE SCREW 6.5X30 SELF-TAP  [employer]- [id number] Left 1 Implanted   SCREW BNE L20MM DIA6. 5MM ACET HIP TI ST TRIL - [id number]  SCREW BNE L20MM DIA6. 5MM ACET HIP TI ST TRIL  [facility] [id number] Left 1 Implanted   SCREW BNE L20MM DIA6. 5MM ACET HIP TI ST TRIL - [id number]  SCREW BNE L20MM DIA6. 5MM ACET HIP TI ST TRIL  [facility] [facility] [id number] Left 1 Implanted   BIOLOX DELTA FEM HEAD 28MM -3.5MM - [id number]  BIOLOX DELTA FEM HEAD 28MM -3.5MM  [employer]- [id number] Left 1 Implanted   BEARING TIB 28X38 MM HIP VIVACIT-E - [id number]  BEARING TIB 28X38 MM HIP VIVACIT-E  [employer]- [id number] Left 1 Implanted   AVENIR COMPLETE STANDARD COLLARED SIZE 5 - [id number]  Avenir Complete Standard Collared Size 5  [employer]- [id number] Left 1 Implanted         Drains:   Urinary Catheter 03/26/23 Shultz (Active)   $ Urethral catheter insertion Inserted for procedure 03/26/23 1827   Catheter Indications Perioperative use for selected surgical procedures 03/27/23 1204   Urine Color

## 2023-03-28 PROBLEM — S72.002A CLOSED FRACTURE OF NECK OF LEFT FEMUR (HCC): Status: ACTIVE | Noted: 2023-03-28

## 2023-03-28 LAB
ALBUMIN SERPL-MCNC: 3.5 G/DL (ref 3.4–5)
ALBUMIN/GLOB SERPL: 1.2 {RATIO} (ref 1.1–2.2)
ALP SERPL-CCNC: 68 U/L (ref 40–129)
ALT SERPL-CCNC: 11 U/L (ref 10–40)
ANION GAP SERPL CALCULATED.3IONS-SCNC: 10 MMOL/L (ref 3–16)
AST SERPL-CCNC: 15 U/L (ref 15–37)
BASOPHILS # BLD: 0.1 K/UL (ref 0–0.2)
BASOPHILS NFR BLD: 0.8 %
BILIRUB SERPL-MCNC: <0.2 MG/DL (ref 0–1)
BUN SERPL-MCNC: 17 MG/DL (ref 7–20)
CALCIUM SERPL-MCNC: 8.8 MG/DL (ref 8.3–10.6)
CHLORIDE SERPL-SCNC: 96 MMOL/L (ref 99–110)
CO2 SERPL-SCNC: 20 MMOL/L (ref 21–32)
CREAT SERPL-MCNC: 0.7 MG/DL (ref 0.6–1.2)
DEPRECATED RDW RBC AUTO: 12.8 % (ref 12.4–15.4)
EOSINOPHIL # BLD: 0 K/UL (ref 0–0.6)
EOSINOPHIL NFR BLD: 0 %
GFR SERPLBLD CREATININE-BSD FMLA CKD-EPI: >60 ML/MIN/{1.73_M2}
GLUCOSE SERPL-MCNC: 183 MG/DL (ref 70–99)
HCT VFR BLD AUTO: 33.5 % (ref 36–48)
HGB BLD-MCNC: 11 G/DL (ref 12–16)
LYMPHOCYTES # BLD: 0.5 K/UL (ref 1–5.1)
LYMPHOCYTES NFR BLD: 4.5 %
MCH RBC QN AUTO: 28.7 PG (ref 26–34)
MCHC RBC AUTO-ENTMCNC: 32.8 G/DL (ref 31–36)
MCV RBC AUTO: 87.3 FL (ref 80–100)
MONOCYTES # BLD: 0.7 K/UL (ref 0–1.3)
MONOCYTES NFR BLD: 5.8 %
NEUTROPHILS # BLD: 10.7 K/UL (ref 1.7–7.7)
NEUTROPHILS NFR BLD: 88.9 %
PLATELET # BLD AUTO: 301 K/UL (ref 135–450)
PMV BLD AUTO: 6.6 FL (ref 5–10.5)
POTASSIUM SERPL-SCNC: 4.8 MMOL/L (ref 3.5–5.1)
PROT SERPL-MCNC: 6.4 G/DL (ref 6.4–8.2)
RBC # BLD AUTO: 3.84 M/UL (ref 4–5.2)
SODIUM SERPL-SCNC: 126 MMOL/L (ref 136–145)
WBC # BLD AUTO: 12 K/UL (ref 4–11)

## 2023-03-28 PROCEDURE — 97161 PT EVAL LOW COMPLEX 20 MIN: CPT

## 2023-03-28 PROCEDURE — 97162 PT EVAL MOD COMPLEX 30 MIN: CPT

## 2023-03-28 PROCEDURE — 97530 THERAPEUTIC ACTIVITIES: CPT

## 2023-03-28 PROCEDURE — APPNB45 APP NON BILLABLE 31-45 MINUTES: Performed by: NURSE PRACTITIONER

## 2023-03-28 PROCEDURE — 51798 US URINE CAPACITY MEASURE: CPT

## 2023-03-28 PROCEDURE — 6360000002 HC RX W HCPCS: Performed by: FAMILY MEDICINE

## 2023-03-28 PROCEDURE — 85025 COMPLETE CBC W/AUTO DIFF WBC: CPT

## 2023-03-28 PROCEDURE — 1200000000 HC SEMI PRIVATE

## 2023-03-28 PROCEDURE — 80053 COMPREHEN METABOLIC PANEL: CPT

## 2023-03-28 PROCEDURE — 36415 COLL VENOUS BLD VENIPUNCTURE: CPT

## 2023-03-28 PROCEDURE — 97535 SELF CARE MNGMENT TRAINING: CPT

## 2023-03-28 PROCEDURE — 99024 POSTOP FOLLOW-UP VISIT: CPT | Performed by: NURSE PRACTITIONER

## 2023-03-28 PROCEDURE — 2580000003 HC RX 258: Performed by: FAMILY MEDICINE

## 2023-03-28 PROCEDURE — 97166 OT EVAL MOD COMPLEX 45 MIN: CPT

## 2023-03-28 PROCEDURE — 6370000000 HC RX 637 (ALT 250 FOR IP): Performed by: NURSE PRACTITIONER

## 2023-03-28 RX ORDER — OXYCODONE HYDROCHLORIDE 5 MG/1
5 TABLET ORAL EVERY 4 HOURS PRN
Qty: 18 TABLET | Refills: 0 | Status: ON HOLD | OUTPATIENT
Start: 2023-03-28 | End: 2023-03-31

## 2023-03-28 RX ORDER — SODIUM CHLORIDE 9 MG/ML
INJECTION, SOLUTION INTRAVENOUS CONTINUOUS
Status: DISCONTINUED | OUTPATIENT
Start: 2023-03-28 | End: 2023-03-29 | Stop reason: HOSPADM

## 2023-03-28 RX ADMIN — ACETAMINOPHEN 1000 MG: 500 TABLET ORAL at 16:07

## 2023-03-28 RX ADMIN — ACETAMINOPHEN 1000 MG: 500 TABLET ORAL at 07:55

## 2023-03-28 RX ADMIN — APIXABAN 2.5 MG: 2.5 TABLET, FILM COATED ORAL at 20:38

## 2023-03-28 RX ADMIN — SODIUM CHLORIDE: 9 INJECTION, SOLUTION INTRAVENOUS at 15:40

## 2023-03-28 RX ADMIN — METOPROLOL TARTRATE 100 MG: 50 TABLET ORAL at 20:38

## 2023-03-28 RX ADMIN — METOPROLOL TARTRATE 100 MG: 50 TABLET ORAL at 07:55

## 2023-03-28 RX ADMIN — CEFTRIAXONE SODIUM 1000 MG: 1 INJECTION, POWDER, FOR SOLUTION INTRAMUSCULAR; INTRAVENOUS at 16:11

## 2023-03-28 RX ADMIN — SODIUM CHLORIDE, PRESERVATIVE FREE 10 ML: 5 INJECTION INTRAVENOUS at 15:36

## 2023-03-28 RX ADMIN — ACETAMINOPHEN 1000 MG: 500 TABLET ORAL at 20:38

## 2023-03-28 RX ADMIN — METOPROLOL TARTRATE 100 MG: 50 TABLET ORAL at 16:07

## 2023-03-28 ASSESSMENT — PAIN SCALES - GENERAL
PAINLEVEL_OUTOF10: 0
PAINLEVEL_OUTOF10: 3
PAINLEVEL_OUTOF10: 0

## 2023-03-28 NOTE — CARE COORDINATION
Case Management Assessment  Initial Evaluation    Date/Time of Evaluation: 3/28/2023 9:58 AM  Assessment Completed by: Michael Watson RN    If patient is discharged prior to next notation, then this note serves as note for discharge by case management. Patient Name: Rachel Suazo                   YOB: 1950  Diagnosis: Hyponatremia [E87.1]  Acute respiratory failure with hypoxia ([facility] [address] [J96.01]  Fall, initial encounter [W19. XXXA]  Closed fracture of neck of left femur, initial encounter ([facility] [address] [S72.002A]  Fracture of base of femoral neck (cervicotrochanteric) open, left, sequela [S72.042S]                   Date / Time: 3/26/2023  7:57 AM    Patient Admission Status: Inpatient   Readmission Risk (Low < 19, Mod (19-27), High > 27): Readmission Risk Score: 15.8    Current PCP: SYLVIE Rubalcava  PCP verified by CM? Yes    Chart Reviewed: Yes      History Provided by: Patient  Patient Orientation: Alert and Oriented    Patient Cognition: Alert    Hospitalization in the last 30 days (Readmission):  No    If yes, Readmission Assessment in CM Navigator will be completed. Advance Directives:      Code Status: Full Code   Patient's Primary Decision Maker is: Legal Next of Kin      Discharge Planning:    Patient lives with: Alone Type of Home: House  Primary Care Giver: Self  Patient Support Systems include: Family Members, Friends/Neighbors   Current Financial resources: Medicare  Current community resources: None  Current services prior to admission: None            Current DME:              Type of Home Care services:  None    ADLS  Prior functional level: Independent in ADLs/IADLs  Current functional level: Assistance with the following:, Mobility    PT AM-PAC:   /24  OT AM-PAC:   /24    Family can provide assistance at DC: Other (comment) (lives alone)  Would you like Case Management to discuss the discharge plan with any other family members/significant others, and if so, who?  No  Plans to Return to

## 2023-03-28 NOTE — CONSULTS
Ambulate Across Room: Physical Assistance Required -    Ascend 3-5 Steps With HR: Physical Assistance Required -      OT    Eating: Physical Assistance Required -    Grooming: Physical Assistance Required -    LB Dressing: Physical Assistance Required -    UB Dressing: Physical Assistance Required -    Bathing: Physical Assistance Required -    Toileting: Physical Assistance Required -      SLP         Body mass index is 32.03 kg/m². Assessment:  Patient Active Problem List   Diagnosis    CAD (coronary artery disease)    HTN (hypertension), benign    Dyslipidemia    Bruit of right carotid artery    DM (diabetes mellitus), type 2, uncontrolled with complications    Tobacco abuse    PAF (paroxysmal atrial fibrillation) ([facility])    Class 1 obesity due to excess calories with serious comorbidity and body mass index (BMI) of 30.0 to 30.9 in adult    Acute CVA (cerebrovascular accident) ([facility] [address]    Encounter for electronic analysis of reveal event recorder    Fracture of base of femoral neck (cervicotrochanteric) open, left, sequela       Plan:     Left hip fracture- s/p arthroplasty  - PT/OT  - per ortho  - WB status:  25% WB through operative extremity; No specific hip precuations  - DVT prophylaxis: Can resume 2.5mg Eliquis this evening, then BID tomorrow and 5mg BID on Thursday 3/30  - Ice therapy  - PT/OT  - Pain Control: Oxy rx in chart for d/c. Due to orthopaedic surgical procedure/condition, patient may require pain medication for up to 6-8 weeks.         Franc Wiseman D.O. M.P.H  PM&R  3/28/2023  10:29 AM
0.6   GLUCOSE 177*   CALCIUM 9.2     Recent Labs     03/26/23  0948   INR 0.97   PROTIME 12.8       No results found for: COLORU, CLARITYU, PH, PHUR, LABSPEC, GLUCOSEU, BLOODU, LEUKOCYTESUR, NITRITE, BILIRUBINUR, UROBILINOGEN, RBCUA, WBCUA, BACTERIA, AMORPHOUS, CRYSTAL    Imaging:  I independently evaluated and interpreted the following images    Left hip and pelvis: There is a displaced fracture of the left femoral neck this is low on the femoral neck however there does appear to be some remnant neck present. No otherwise no dysplasia of the hip there are no degenerative changes noted of the hip    IMPRESSION:  81-year-old female with a displaced left femoral neck fracture    RECOMMENDATIONS:  Plan of care:   Weight bearing status- bedrest  Precautions-  bedrest  Pain control- Tylenol 1000mg TID, toradol x 48 hrs, oxycodone PRN   N.p.o. past midnight hold anticoagulation tomorrow,    We discussed the diagnosis and treatment options in detail with the patient. I explained the nature of her left hip fracture and poor outcomes with nonoperative treatment  We discussed the surgical procedure, recovery period, and protocol for pain management, expected post-operative course in detail. We discussed the potential benefits of the surgery including  improved mobility, pain relief as well as the inherent risks including but are not limited to infection, limb length discrepancy, hardware failure, nerve or vascular injury, need for further surgery, deep vein thrombus, pulmonary embolism. The patient has verbalized understanding of these risks and wishes to proceed.       Xiang Vernon MD  Orthopedic Surgery, Adult Reconstruction

## 2023-03-28 NOTE — DISCHARGE INSTR - COC
Continuity of Care Form    Patient Name: Ellen Eldridge   :  1950  MRN:  [id number]    Admit date:  3/26/2023  Discharge date:  ***    Code Status Order: Full Code   Advance Directives:   Advance Care Flowsheet Documentation       Date/Time Healthcare Directive Type of Healthcare Directive Copy in [address] Agent's Name Healthcare Agent's Phone Number    23 1200 No, patient does not have an advance directive for healthcare treatment -- -- -- -- --            Admitting Physician:  Arnav Swain DO  PCP: Candis Mendez    Discharging Nurse: [facility] Unit/Room#: 6GX-2269/9030-93  Discharging Unit Phone Number: ***    Emergency Contact:   Extended Emergency Contact Information  Primary Emergency Contact: Jazlyn Case  [place] Phone: [phone]  Relation: Other  Secondary Emergency Contact: [address]  Phone: [phone]  Relation: Other    Past Surgical History:  Past Surgical History:   Procedure Laterality Date    CARDIAC SURGERY  2001    stent    CHOLECYSTECTOMY      in [address], ESOPHAGUS      gastric bypass    GASTRIC BYPASS SURGERY         Immunization History:   Immunization History   Administered Date(s) Administered    COVID-19, PFIZER PURPLE top, DILUTE for use, (age 15 y+), 30mcg/0.3mL 2021, 2021, 2022       Active Problems:  Patient Active Problem List   Diagnosis Code    CAD (coronary artery disease) I25.10    HTN (hypertension), benign I10    Dyslipidemia E78.5    Bruit of right carotid artery R09.89    DM (diabetes mellitus), type 2, uncontrolled with complications [id number]    Tobacco abuse Z72.0    PAF (paroxysmal atrial fibrillation) ([facility]) I48.0    Class 1 obesity due to excess calories with serious comorbidity and body mass index (BMI) of 30.0 to 30.9 in adult E66.09, Z68.30    Acute CVA (cerebrovascular accident) ([facility] [address] I63.9    Encounter for electronic

## 2023-03-29 ENCOUNTER — HOSPITAL ENCOUNTER (INPATIENT)
Age: 73
DRG: 561 | End: 2023-03-29
Attending: PHYSICAL MEDICINE & REHABILITATION | Admitting: PHYSICAL MEDICINE & REHABILITATION
Payer: MEDICARE

## 2023-03-29 VITALS
DIASTOLIC BLOOD PRESSURE: 152 MMHG | SYSTOLIC BLOOD PRESSURE: 179 MMHG | BODY MASS INDEX: 32.12 KG/M2 | TEMPERATURE: 98.2 F | RESPIRATION RATE: 16 BRPM | HEIGHT: 60 IN | OXYGEN SATURATION: 94 % | HEART RATE: 94 BPM | WEIGHT: 163.58 LBS

## 2023-03-29 PROBLEM — S72.002S CLOSED LEFT HIP FRACTURE, SEQUELA: Status: ACTIVE | Noted: 2023-03-29

## 2023-03-29 LAB
ALBUMIN SERPL-MCNC: 3.7 G/DL (ref 3.4–5)
ALBUMIN/GLOB SERPL: 1.3 {RATIO} (ref 1.1–2.2)
ALP SERPL-CCNC: 74 U/L (ref 40–129)
ALT SERPL-CCNC: 17 U/L (ref 10–40)
ANION GAP SERPL CALCULATED.3IONS-SCNC: 9 MMOL/L (ref 3–16)
AST SERPL-CCNC: 24 U/L (ref 15–37)
BASOPHILS # BLD: 0 K/UL (ref 0–0.2)
BASOPHILS NFR BLD: 0.3 %
BILIRUB SERPL-MCNC: <0.2 MG/DL (ref 0–1)
BUN SERPL-MCNC: 12 MG/DL (ref 7–20)
CALCIUM SERPL-MCNC: 8.9 MG/DL (ref 8.3–10.6)
CHLORIDE SERPL-SCNC: 100 MMOL/L (ref 99–110)
CO2 SERPL-SCNC: 24 MMOL/L (ref 21–32)
CREAT SERPL-MCNC: 0.6 MG/DL (ref 0.6–1.2)
DEPRECATED RDW RBC AUTO: 13.1 % (ref 12.4–15.4)
EOSINOPHIL # BLD: 0 K/UL (ref 0–0.6)
EOSINOPHIL NFR BLD: 0.2 %
GFR SERPLBLD CREATININE-BSD FMLA CKD-EPI: >60 ML/MIN/{1.73_M2}
GLUCOSE SERPL-MCNC: 144 MG/DL (ref 70–99)
HCT VFR BLD AUTO: 33.7 % (ref 36–48)
HGB BLD-MCNC: 11 G/DL (ref 12–16)
LYMPHOCYTES # BLD: 1.7 K/UL (ref 1–5.1)
LYMPHOCYTES NFR BLD: 16 %
MCH RBC QN AUTO: 28.4 PG (ref 26–34)
MCHC RBC AUTO-ENTMCNC: 32.6 G/DL (ref 31–36)
MCV RBC AUTO: 87 FL (ref 80–100)
MONOCYTES # BLD: 0.9 K/UL (ref 0–1.3)
MONOCYTES NFR BLD: 8.3 %
NEUTROPHILS # BLD: 7.9 K/UL (ref 1.7–7.7)
NEUTROPHILS NFR BLD: 75.2 %
PLATELET # BLD AUTO: 300 K/UL (ref 135–450)
PMV BLD AUTO: 6.7 FL (ref 5–10.5)
POTASSIUM SERPL-SCNC: 4.5 MMOL/L (ref 3.5–5.1)
PROT SERPL-MCNC: 6.5 G/DL (ref 6.4–8.2)
RBC # BLD AUTO: 3.88 M/UL (ref 4–5.2)
SODIUM SERPL-SCNC: 133 MMOL/L (ref 136–145)
WBC # BLD AUTO: 10.5 K/UL (ref 4–11)

## 2023-03-29 PROCEDURE — 99024 POSTOP FOLLOW-UP VISIT: CPT | Performed by: NURSE PRACTITIONER

## 2023-03-29 PROCEDURE — APPNB45 APP NON BILLABLE 31-45 MINUTES: Performed by: NURSE PRACTITIONER

## 2023-03-29 PROCEDURE — 6370000000 HC RX 637 (ALT 250 FOR IP): Performed by: PHYSICAL MEDICINE & REHABILITATION

## 2023-03-29 PROCEDURE — 6370000000 HC RX 637 (ALT 250 FOR IP): Performed by: NURSE PRACTITIONER

## 2023-03-29 PROCEDURE — 97535 SELF CARE MNGMENT TRAINING: CPT

## 2023-03-29 PROCEDURE — 1280000000 HC REHAB R&B

## 2023-03-29 PROCEDURE — 97530 THERAPEUTIC ACTIVITIES: CPT

## 2023-03-29 PROCEDURE — 85025 COMPLETE CBC W/AUTO DIFF WBC: CPT

## 2023-03-29 PROCEDURE — 6360000002 HC RX W HCPCS: Performed by: PHYSICAL MEDICINE & REHABILITATION

## 2023-03-29 PROCEDURE — 36415 COLL VENOUS BLD VENIPUNCTURE: CPT

## 2023-03-29 PROCEDURE — 80053 COMPREHEN METABOLIC PANEL: CPT

## 2023-03-29 PROCEDURE — 2580000003 HC RX 258: Performed by: PHYSICAL MEDICINE & REHABILITATION

## 2023-03-29 RX ORDER — ONDANSETRON 4 MG/1
4 TABLET, ORALLY DISINTEGRATING ORAL EVERY 8 HOURS PRN
Status: ACTIVE | OUTPATIENT
Start: 2023-03-29

## 2023-03-29 RX ORDER — POLYETHYLENE GLYCOL 3350 17 G/17G
17 POWDER, FOR SOLUTION ORAL DAILY PRN
Status: DISCONTINUED | OUTPATIENT
Start: 2023-03-29 | End: 2023-03-29

## 2023-03-29 RX ORDER — OXYCODONE HYDROCHLORIDE 5 MG/1
5 TABLET ORAL EVERY 4 HOURS PRN
Status: CANCELLED | OUTPATIENT
Start: 2023-03-29

## 2023-03-29 RX ORDER — ONDANSETRON 4 MG/1
4 TABLET, ORALLY DISINTEGRATING ORAL EVERY 8 HOURS PRN
Status: CANCELLED | OUTPATIENT
Start: 2023-03-29

## 2023-03-29 RX ORDER — SODIUM CHLORIDE 0.9 % (FLUSH) 0.9 %
5-40 SYRINGE (ML) INJECTION PRN
Status: ACTIVE | OUTPATIENT
Start: 2023-03-29

## 2023-03-29 RX ORDER — ACETAMINOPHEN 500 MG
1000 TABLET ORAL 3 TIMES DAILY
Status: DISPENSED | OUTPATIENT
Start: 2023-03-29

## 2023-03-29 RX ORDER — ONDANSETRON 2 MG/ML
4 INJECTION INTRAMUSCULAR; INTRAVENOUS EVERY 6 HOURS PRN
Status: ACTIVE | OUTPATIENT
Start: 2023-03-29

## 2023-03-29 RX ORDER — SODIUM CHLORIDE 0.9 % (FLUSH) 0.9 %
5-40 SYRINGE (ML) INJECTION PRN
Status: CANCELLED | OUTPATIENT
Start: 2023-03-29

## 2023-03-29 RX ORDER — METOPROLOL TARTRATE 50 MG/1
100 TABLET, FILM COATED ORAL 3 TIMES DAILY
Status: CANCELLED | OUTPATIENT
Start: 2023-03-29

## 2023-03-29 RX ORDER — SODIUM CHLORIDE 0.9 % (FLUSH) 0.9 %
5-40 SYRINGE (ML) INJECTION EVERY 12 HOURS SCHEDULED
Status: CANCELLED | OUTPATIENT
Start: 2023-03-29

## 2023-03-29 RX ORDER — BISACODYL 5 MG/1
5 TABLET, DELAYED RELEASE ORAL DAILY
Status: CANCELLED | OUTPATIENT
Start: 2023-03-29

## 2023-03-29 RX ORDER — BISACODYL 5 MG/1
5 TABLET, DELAYED RELEASE ORAL DAILY
Status: DISCONTINUED | OUTPATIENT
Start: 2023-03-29 | End: 2023-03-29

## 2023-03-29 RX ORDER — POLYETHYLENE GLYCOL 3350 17 G/17G
17 POWDER, FOR SOLUTION ORAL DAILY
Status: DISPENSED | OUTPATIENT
Start: 2023-03-30

## 2023-03-29 RX ORDER — OXYCODONE HYDROCHLORIDE 5 MG/1
5 TABLET ORAL EVERY 4 HOURS PRN
Status: ACTIVE | OUTPATIENT
Start: 2023-03-29

## 2023-03-29 RX ORDER — ONDANSETRON 2 MG/ML
4 INJECTION INTRAMUSCULAR; INTRAVENOUS EVERY 6 HOURS PRN
Status: CANCELLED | OUTPATIENT
Start: 2023-03-29

## 2023-03-29 RX ORDER — SODIUM CHLORIDE 0.9 % (FLUSH) 0.9 %
5-40 SYRINGE (ML) INJECTION EVERY 12 HOURS SCHEDULED
Status: DISCONTINUED | OUTPATIENT
Start: 2023-03-29 | End: 2023-04-01

## 2023-03-29 RX ORDER — NYSTATIN 100000 U/G
CREAM TOPICAL 2 TIMES DAILY
Status: DISPENSED | OUTPATIENT
Start: 2023-03-29

## 2023-03-29 RX ORDER — METOPROLOL TARTRATE 50 MG/1
100 TABLET, FILM COATED ORAL 3 TIMES DAILY
Status: DISPENSED | OUTPATIENT
Start: 2023-03-29

## 2023-03-29 RX ORDER — ACETAMINOPHEN 500 MG
1000 TABLET ORAL 3 TIMES DAILY
Status: CANCELLED | OUTPATIENT
Start: 2023-03-29

## 2023-03-29 RX ORDER — POLYETHYLENE GLYCOL 3350 17 G/17G
17 POWDER, FOR SOLUTION ORAL DAILY PRN
Status: CANCELLED | OUTPATIENT
Start: 2023-03-29

## 2023-03-29 RX ADMIN — METOPROLOL TARTRATE 100 MG: 50 TABLET ORAL at 08:02

## 2023-03-29 RX ADMIN — CEFTRIAXONE SODIUM 1000 MG: 1 INJECTION, POWDER, FOR SOLUTION INTRAMUSCULAR; INTRAVENOUS at 18:46

## 2023-03-29 RX ADMIN — APIXABAN 2.5 MG: 2.5 TABLET, FILM COATED ORAL at 08:02

## 2023-03-29 RX ADMIN — ACETAMINOPHEN 1000 MG: 500 TABLET ORAL at 08:02

## 2023-03-29 RX ADMIN — ACETAMINOPHEN 1000 MG: 500 TABLET ORAL at 21:37

## 2023-03-29 RX ADMIN — Medication 5 ML: at 19:54

## 2023-03-29 RX ADMIN — APIXABAN 2.5 MG: 2.5 TABLET, FILM COATED ORAL at 21:37

## 2023-03-29 RX ADMIN — NYSTATIN: 100000 CREAM TOPICAL at 21:36

## 2023-03-29 RX ADMIN — METOPROLOL TARTRATE 100 MG: 50 TABLET ORAL at 21:31

## 2023-03-29 ASSESSMENT — PAIN SCALES - GENERAL: PAINLEVEL_OUTOF10: 0

## 2023-03-29 NOTE — PLAN OF CARE
Problem: Discharge Planning  Goal: Discharge to home or other facility with appropriate resources  3/29/2023 1703 by Sonja Chun RN  Outcome: Completed  3/29/2023 1702 by Sonja Chun RN  Outcome: Progressing     Problem: Pain  Goal: Verbalizes/displays adequate comfort level or baseline comfort level  3/29/2023 1703 by Sonja Chun RN  Outcome: Completed  3/29/2023 1702 by Sonja Chun RN  Outcome: Progressing     Problem: Skin/Tissue Integrity  Goal: Absence of new skin breakdown  Description: 1. Monitor for areas of redness and/or skin breakdown  2. Assess vascular access sites hourly  3. Every 4-6 hours minimum:  Change oxygen saturation probe site  4. Every 4-6 hours:  If on nasal continuous positive airway pressure, respiratory therapy assess nares and determine need for appliance change or resting period.   3/29/2023 1703 by Sonja Chun RN  Outcome: Completed  3/29/2023 1702 by Sonja Chun RN  Outcome: Progressing     Problem: Safety - Adult  Goal: Free from fall injury  3/29/2023 1703 by Sonja Chun RN  Outcome: Completed  3/29/2023 1702 by Sonja Chun RN  Outcome: Progressing

## 2023-03-29 NOTE — OP NOTE
MOBILITY IMPL CAPPED H3 - [id number]  HIP H3 ADV DUAL MOBILITY IMPL CAPPED H3  [employer]  Left 1 Captitated Pricing        INDICATIONS FOR OPERATION: Ellen Eldridge is a 81 y.o. female who sustained a femoral neck fracture presented to the ED. Radiographs demonstrate displacement of the femoral neck fracture In the hospital the risks and benefits of surgery were discussed with the patient, as well as the alternatives to surgery and the expected post-operative course. Informed consent was obtained. I explained the benefits and limitations of fixation and arthroplasty. A decision was made to proceed with arthroplasty. DESCRIPTION OF OPERATION: She was met in the preoperative holding area where the informed consent was reviewed and the operative site was marked. Subjective and clinical leg lengths were assessed. She was then taken back to the Operating Room. After induction of anesthesia, she was then placed into the supine position. All bony prominences were well padded. The operative extremity was prepped and draped in the usual sterile fashion. A safety timeout was performed where the correct patient, planned operative procedure, and correct operative site was reviewed and agreed upon by all Operating Room staff. It was also confirmed that the patient had received a dose of intravenous prophylactic antibiotics. The planned incision was marked along the anterior aspect of the hip, slightly lateral and distal to the ASIS. After the incision, the subcutaneous adipose tissue was dissected with electrocautery down to the level of the fascia. A Moore elevator was used to better visualize the fascial layer directly over the TFL. A scalpel was used to incise the fascia in line with the overlying incision. The fascia was elevated medially identifying the interval between the TFL and rectus femoris. A ascending circumflex vessels were identified and carefully ligated with electrocautery.

## 2023-03-29 NOTE — DISCHARGE SUMMARY
DO   3/29/2023      Thank you CHAD Ozella Cogan for the opportunity to be involved in this patient's care. If you have any questions or concerns please feel free to contact me at [phone].

## 2023-03-30 LAB
GLUCOSE BLD-MCNC: 152 MG/DL (ref 70–99)
GLUCOSE BLD-MCNC: 185 MG/DL (ref 70–99)
GLUCOSE BLD-MCNC: 217 MG/DL (ref 70–99)
GLUCOSE BLD-MCNC: 238 MG/DL (ref 70–99)
PERFORMED ON: ABNORMAL

## 2023-03-30 PROCEDURE — 97116 GAIT TRAINING THERAPY: CPT

## 2023-03-30 PROCEDURE — 99222 1ST HOSP IP/OBS MODERATE 55: CPT | Performed by: PHYSICAL MEDICINE & REHABILITATION

## 2023-03-30 PROCEDURE — 94760 N-INVAS EAR/PLS OXIMETRY 1: CPT

## 2023-03-30 PROCEDURE — 97166 OT EVAL MOD COMPLEX 45 MIN: CPT

## 2023-03-30 PROCEDURE — 6370000000 HC RX 637 (ALT 250 FOR IP): Performed by: PHYSICAL MEDICINE & REHABILITATION

## 2023-03-30 PROCEDURE — 1280000000 HC REHAB R&B

## 2023-03-30 PROCEDURE — 6360000002 HC RX W HCPCS: Performed by: PHYSICAL MEDICINE & REHABILITATION

## 2023-03-30 PROCEDURE — 97162 PT EVAL MOD COMPLEX 30 MIN: CPT

## 2023-03-30 PROCEDURE — 97535 SELF CARE MNGMENT TRAINING: CPT

## 2023-03-30 PROCEDURE — 97530 THERAPEUTIC ACTIVITIES: CPT

## 2023-03-30 PROCEDURE — 2580000003 HC RX 258: Performed by: PHYSICAL MEDICINE & REHABILITATION

## 2023-03-30 PROCEDURE — 94150 VITAL CAPACITY TEST: CPT

## 2023-03-30 RX ADMIN — ACETAMINOPHEN 1000 MG: 500 TABLET ORAL at 21:34

## 2023-03-30 RX ADMIN — ACETAMINOPHEN 1000 MG: 500 TABLET ORAL at 10:23

## 2023-03-30 RX ADMIN — Medication 10 ML: at 21:36

## 2023-03-30 RX ADMIN — APIXABAN 5 MG: 5 TABLET, FILM COATED ORAL at 10:23

## 2023-03-30 RX ADMIN — ACETAMINOPHEN 1000 MG: 500 TABLET ORAL at 15:41

## 2023-03-30 RX ADMIN — CEFTRIAXONE SODIUM 1000 MG: 1 INJECTION, POWDER, FOR SOLUTION INTRAMUSCULAR; INTRAVENOUS at 21:27

## 2023-03-30 RX ADMIN — NYSTATIN: 100000 CREAM TOPICAL at 21:36

## 2023-03-30 RX ADMIN — POLYETHYLENE GLYCOL 3350 17 G: 17 POWDER, FOR SOLUTION ORAL at 11:34

## 2023-03-30 RX ADMIN — APIXABAN 5 MG: 5 TABLET, FILM COATED ORAL at 21:35

## 2023-03-30 RX ADMIN — METOPROLOL TARTRATE 100 MG: 50 TABLET ORAL at 15:41

## 2023-03-30 RX ADMIN — NYSTATIN: 100000 CREAM TOPICAL at 10:22

## 2023-03-30 RX ADMIN — Medication 10 ML: at 11:35

## 2023-03-30 RX ADMIN — SODIUM CHLORIDE, PRESERVATIVE FREE 10 ML: 5 INJECTION INTRAVENOUS at 21:35

## 2023-03-30 RX ADMIN — METOPROLOL TARTRATE 100 MG: 50 TABLET ORAL at 21:34

## 2023-03-30 ASSESSMENT — PAIN SCALES - GENERAL
PAINLEVEL_OUTOF10: 0

## 2023-03-30 ASSESSMENT — PAIN DESCRIPTION - LOCATION: LOCATION: HIP

## 2023-03-30 ASSESSMENT — PAIN DESCRIPTION - ORIENTATION: ORIENTATION: LEFT

## 2023-03-30 ASSESSMENT — PAIN DESCRIPTION - DESCRIPTORS: DESCRIPTORS: ACHING

## 2023-03-30 NOTE — CARE COORDINATION
Social Work Admission Assessment    Objective:  Met with pt to complete initial assessment and review role of  in rehab process. Pt oriented to unit. Pt states understanding of this. Current Home Situation:  Patient lives at home alone and she resides in a 1st floor apartment with no steps to enter. Pt's plans re:  Return to work/school/volunteer:  Patient is retired. Accessibility to community resources/transportation:  Patient is not active with any community resource programs./Family will provide transportation @ discharge. Has pt experienced a recent loss or signigicant life event that would impact their care or ability to participate? _x_No  __Yes - Explain    Has pt ever been treated for emotional disorders? _x_No  __Yes--How does that affect current situation:    How does pt and family cope with stressful events and this hospitalization? Patient reports that she has never had any issues coping with stressful events or situations. Patient appears to be coping well with hospitalization. Special Problem Areas:  None    Discharge Plan: To home with needed supports. Impression/Plan: Cande Ignacio (patient )is a 81year old female that has been admitted to ARU. Provided patient with this SW's contact information to contact as needed. SW/CM also informed patient about the team conference to be held on Tuesday 4/4/2023 @ 11:00 AM Will continue to follow for support and discharge planning.     Electronically signed by ERIBERTO Jo on 3/30/2023 at 1:54 PM

## 2023-03-30 NOTE — H&P
Patient: Mandi Bhardwaj  [id number]  Date: 3/30/2023      Chief Complaint: Fracture of base of femoral neck (cervicotrochanteric) open, left, sequela    History of Present Illness/Hospital Course: This patient is a 46-year-old female medical history significant for CAD DM2 HTN HLD CVA. She presents after a fall at home with subsequent femoral neck fracture. She is currently S/p left total hip arthroplasty for fracture (3/27/23) with Dr. Herbie Allred. She has limited pain due to nerve block. Prior Level of Function:  Independent     Current Level of Function:  Mod assist     Functional Deficits:  Left hip pain      has a past medical history of Acute CVA (cerebrovascular accident) ([facility] [address] Arthritis, CAD (coronary artery disease), Diabetes mellitus ([facility] [address] Hyperlipidemia, and Hypertension. has a past surgical history that includes Coronary artery bypass graft (1993); Gastric bypass surgery (2004); Cardiac surgery (2001); Dilatation, esophagus; Cholecystectomy; and Total hip arthroplasty (Left, 03/27/2023). reports that she has been smoking cigarettes. She has a 30.00 pack-year smoking history. She has never used smokeless tobacco. She reports that she does not drink alcohol and does not use drugs. family history includes Cancer in her father, maternal aunt, and mother; Diabetes in her father; Heart Disease in her father.     Allergies: Codeine and Demerol    Current Facility-Administered Medications   Medication Dose Route Frequency Provider Last Rate Last Admin    apixaban (ELIQUIS) tablet 5 mg  5 mg Oral BID Elliott Garner DO        acetaminophen (TYLENOL) tablet 1,000 mg  1,000 mg Oral TID Namindjuan jose Garner, DO   1,000 mg at 03/29/23 2137    cefTRIAXone (ROCEPHIN) 1,000 mg in sodium chloride 0.9 % 50 mL IVPB (mini-bag)  1,000 mg IntraVENous Q24H Elliott Garner DO   Stopped at 03/29/23 1951    metoprolol tartrate (LOPRESSOR) tablet 100 mg  100 mg Oral TID Elliott Garner DO   100 mg at 03/29/23 2131

## 2023-03-30 NOTE — PROGRESS NOTES
Physician Progress Note      PATIENT:               DERIK BRANDON  [facility] #:                  [id number]  :                       1950  ADMIT DATE:       3/26/2023 7:57 AM  DISCH DATE:  RESPONDING  PROVIDER #:        Logan Tello DO          QUERY TEXT:    Pt admitted with Left femoral neck fracture. Pt noted to have XR Hip 3/26 of   the pelvis osteopenia. . If possible, please document in progress notes and   discharge summary if you are evaluating and/or treating any of the following:    The medical record reflects the following:  Risk Factors: Left Hip total arthroplasty.  Clinical Indicators: XR HIP 2-3 VW W PELVIS LEFT-acute basicervical left   femoral neck fracture and osteopenia, Pt age is 72yrs F, H&P notes 3/26-She   presents after a fall at home with subsequent femoral neck fracture. She tells   me that her \"dizziness\" and weakness have been worse over the past week.  Treatment: Consult to Orthopedic, Multiple Vitamin.Lt hip arthroplasty  Options provided:  -- Pathological Left femoral neck fracture due to osteopenia  -- Pathological Left femoral neck fracture due to osteopenia following fall   which would not usually break a normal, healthy bone  -- Traumatic Left femoral neck fracture  -- Other - I will add my own diagnosis  -- Disagree - Not applicable / Not valid  -- Disagree - Clinically unable to determine / Unknown  -- Refer to Clinical Documentation Reviewer    PROVIDER RESPONSE TEXT:    This patient has a traumatic Left femoral neck fracture.    Query created by: Lisbet Wells on 3/29/2023 12:34 AM      Electronically signed by:  Logan Tello DO 3/29/2023 8:54 AM          
Another message sent to Dr. Cristela Del Valle regarding HR and needing beta-blocker order changed. Pt having hip surgery today. Pt in and out of A-fib. HR has been staying in the 70-80's. Pt with multiple PVC's.
Assessment complete. VSS. Neuro checks WNL, dressing to Left hip is CD&I, patient denies pain at this time, states she still feels numb around incision site. Call light within reach, bed alarm on. POC discussed with patient and agreed upon mutually.  Will continue to monitor  Darcie Rolle RN
Daxa Mendoza Patch  3/29/2023  [id number]    Chief Complaint: Fracture of base of femoral neck (cervicotrochanteric) open, left, sequela    Subjective: This patient is a 27-year-old female medical history significant for CAD DM2 HTN HLD CVA. She presents after a fall at home with subsequent femoral neck fracture. She is currently S/p left total hip arthroplasty for fracture (3/27/23) with Dr. Rupesh Rainey. She has limited pain due to nerve block. She would like to come to the ARU to improve her overall function before discharge home. Interval history: Doing well today with less pain. Plan to admit to ARU     ROS: No CP, SOB, dyspnea    Objective:  Patient Vitals for the past 24 hrs:   BP Temp Temp src Pulse Resp SpO2 Weight   03/29/23 0800 (!) 137/90 97.8 °F (36.6 °C) Oral 94 18 95 % --   03/29/23 0615 -- -- -- -- -- -- 163 lb 9.3 oz (74.2 kg)   03/29/23 0502 130/67 97.9 °F (36.6 °C) Oral 89 18 93 % --   03/28/23 2348 118/78 98.2 °F (36.8 °C) Oral 74 16 95 % --   03/28/23 2038 (!) 121/56 97.6 °F (36.4 °C) Oral 85 16 96 % --   03/28/23 1600 127/69 97.6 °F (36.4 °C) Oral (!) 105 16 97 % --   03/28/23 1319 133/84 97.6 °F (36.4 °C) Oral 93 16 97 % --       Gen: No distress, pleasant. HEENT: Normocephalic, atraumatic. CV: No audible murmurs, well perfused extremities  Resp: No respiratory distress. No increased WOB  Abd: Soft, nontender nondistended  Ext: left mild edema. Neuro: Alert, oriented, appropriately interactive. Laboratory data: Available via EMR.      Therapy progress:    PT    Rolling: Level of difficulty - A Little   Sit to Stand from a Chair: Level of difficulty - A Little  Supine to Sit: Level of difficulty - A Little     Bed to Chair: Physical Assistance Required - A Lot  Ambulate Across Room: Physical Assistance Required - A Lot  Ascend 3-5 Steps With HR: Physical Assistance Required - A Lot    OT    Eating: Physical Assistance Required - None  Grooming: Physical Assistance Required - A
Hospitalist Progress Note      PCP: Raul Young    Date of Admission: 3/26/2023    Hospital Course:     Impression: This patient is a 29-year-old female medical history significant for CAD DM2 HTN HLD CVA. She presents after a fall at home with subsequent femoral neck fracture. 3/28      A/P:     1. Ground-level fall with subsequent acute basicervical left femoral neck fracture:    Pain control PT OT.  3/27 plan for OR today around noon. 2.  Dizziness: 3/28 improved with volume and pain control. 3. DM2: SSI     4. HTN: Home meds     5. Recent UTI: cont rocephin. change to omnicef on discharge. 6. Hyponatremia: giving IVF,      Code status: Full code     DVT prophylaxis: Lovenox subcu holding home Eliquis for now restart when okay with surgical service     Transition of care [place] [id number] versus skilled     Anticipated date of discharge: Possibly 24 hours     24-hour care plan:     Pain control PT OT post op care, follow labs watch for sodium improvement. Possibly discharge to ARU vs skilled in the next day or so if ok with all and labs WNL.        Medications:  Reviewed    Infusion Medications    sodium chloride      sodium chloride 10 mL/hr at 03/27/23 1638     Scheduled Medications    apixaban  2.5 mg Oral BID    metoprolol tartrate  100 mg Oral TID    acetaminophen  1,000 mg Oral TID    sodium chloride flush  5-40 mL IntraVENous 2 times per day    [Held by provider] enoxaparin  40 mg SubCUTAneous Daily    cefTRIAXone (ROCEPHIN) IV  1,000 mg IntraVENous Q24H     PRN Meds: sodium chloride flush, sodium chloride, ondansetron **OR** ondansetron, polyethylene glycol, acetaminophen **OR** acetaminophen, oxyCODONE      Intake/Output Summary (Last 24 hours) at 3/28/2023 1343  Last data filed at 3/28/2023 0804  Gross per 24 hour   Intake 1600 ml   Output 675 ml   Net 925 ml         Exam:    /84   Pulse 93   Temp 97.6 °F (36.4 °C) (Oral)   Resp 16   Ht 5' (1.524 m)   Wt 164 lb (74.4 kg)
Hospitalist Progress Note      PCP: Rosemary Morrow    Date of Admission: 3/26/2023    Hospital Course:     Impression: This patient is a 68-year-old female medical history significant for CAD DM2 HTN HLD CVA. She presents after a fall at home with subsequent femoral neck fracture. 3/27 givigin IVF prior to OR today. A/P:     1. Ground-level fall with subsequent acute basicervical left femoral neck fracture:    Pain control PT OT.  3/27 plan for OR today around noon. 2.  Dizziness: 3/27 improved with volume and pain control. 3. DM2: SSI     4. HTN: Home meds     5. Recent UTI: cont rocephin. Code status: Full code     DVT prophylaxis: Lovenox subcu holding home Eliquis for now restart when okay with surgical service     Transition of care [place] [address] versus skilled     Anticipated date of discharge: Possibly 2 to 3 days     24-hour care plan:     Pain control PT OT  OR today, follow labs. Discharge planning subsequently.          Medications:  Reviewed    Infusion Medications    sodium chloride       Scheduled Medications    ortho mix injection   Injection On Call    tranexamic acid-NaCl  1,000 mg IntraVENous Once    albumin human  25 g IntraVENous Once    sodium chloride  250 mL IntraVENous Once    sodium chloride flush  5-40 mL IntraVENous 2 times per day    [Held by provider] enoxaparin  40 mg SubCUTAneous Daily    cefTRIAXone (ROCEPHIN) IV  1,000 mg IntraVENous Q24H    metoprolol tartrate  100 mg Oral BID     PRN Meds: sodium chloride flush, sodium chloride, ondansetron **OR** ondansetron, polyethylene glycol, acetaminophen **OR** acetaminophen, oxyCODONE      Intake/Output Summary (Last 24 hours) at 3/27/2023 1126  Last data filed at 3/27/2023 0145  Gross per 24 hour   Intake --   Output 1500 ml   Net -1500 ml       Exam:    BP (!) 133/90   Pulse (!) 130   Temp 98 °F (36.7 °C) (Tympanic)   Resp 16   Ht 5' (1.524 m)   Wt 164 lb (74.4 kg)   SpO2 94%   BMI 32.03 kg/m²     General
Message sent to Dr. Lucia Patel regarding HR spiking into the 140's and having PVC's. Pt has a HX of a-fib and takes Eliquis. Pt is resting in bed. Pt c/o pain 10/10, but refuses anything stronger than tylenol.
Occupational/Physical Therapy  Daxa Whitehead      Received order for OT/PT evaluation. Pt had a hip fx and is having FAROOQ today. Will attempt again tomorrow. Thank you.    Shanta Peres, Michelle Junior., OTR/L, [id number]  Alexandro Smart, [id number]
Patient returned to unit from PACU. VSS. Alert and oriented x 4. Rates pain 10/10, only wants tylenol at this time for pain. Left hip dressing CDI. Vascular checks WDL.
Patient stating metoprolol 100mg was recently increased to three times a day. Currently ordered as twice a day. Heart continues to fluctuate into the 130's and 140's. Message sent to Dr. David Constantino to notify him.
Patient transferred from OR to PACU, responds to voice, VSS, left hip dressing CDI, LLE vascular checks WDL, will monitor.
Physician Progress Note      Primo Conner  CSN #:                  [id number]  :                       1950  ADMIT DATE:       3/26/2023 7:57 AM  DISCH DATE:        3/29/2023 5:03 PM  RESPONDING  PROVIDER #:        Paz Strickland DO          QUERY TEXT:    Patient admitted with left femoral neck fracture. Documentation reflects acute   respiratory failure in ED notes 3/28. If possible, please document in the   progress notes and discharge summary if acute respiratory failure was: The medical record reflects the following: The medical record reflects the following:  Risk Factors: HTN, HLD, CAD, DM. Clinical Indicators: Per nn's -O2 sats 73% RA, repositioned pt and placed on   5L O2, now 92%. ED notes Will provide oxygen via nasal cannula to maintain   oxygen saturations above 90%, Acute respiratory failure with hypoxia, LUNGS:   Respirations unlabored. CTAB. Good air exchange. Speaking comfortably in full   sentences. RR- 16-29  Per h&p-Respiratory:  Normal respiratory effort. Clear   to auscultation, bilaterally without Rales/Wheezes/Rhonchi  Treatment: O2@ 5-2l. monitor resp status, monitor o2 sats, wean o2  Options provided:  -- Acute Respiratory Failure as evidenced by, Please document evidence. -- Acute Respiratory Failure as evidenced ruled out after study  -- Other - I will add my own diagnosis  -- Disagree - Not applicable / Not valid  -- Disagree - Clinically unable to determine / Unknown  -- Refer to Clinical Documentation Reviewer    PROVIDER RESPONSE TEXT:    Acute Respiratory Failure as evidenced ruled out after study. Query created by: Cora Rankin on 3/30/2023 12:29 AM      Electronically signed by:   Paz Strickland DO 3/30/2023 8:45 AM
Pt resting in bed. HR in the 70-80's. Still A-fib.
Pt transferred to the unit from ED. Pt A&Ox4, c/o pain to left hip 5/10. Admission assessment completed. /90, , RR 20, Oxygen 95% on 4 L. Hx of a/fib, CHF, and stent placement. No skin issues. Pt unable get up and move.
[facility] [address] Department   Phone: [phone]    Physical Therapy    [x] Initial Evaluation            [] Daily Treatment Note         [] Discharge Summary      Patient: Lonnie Hsieh   : 1950   MRN: [id number]   Date of Service:  3/28/2023  Admitting Diagnosis: Fracture of base of femoral neck (cervicotrochanteric) open, left, sequela  Current Admission Summary: This patient is a 74-year-old female medical history significant for CAD DM2 HTN HLD CVA. She presents after a fall at home with subsequent femoral neck fracture. She is currently S/p left total hip arthroplasty for fracture (3/27/23) with Dr. Michael Groves .      Past Medical History:  has a past medical history of Acute CVA (cerebrovascular accident) ([facility] [address] Arthritis, CAD (coronary artery disease), Diabetes mellitus ([facility] [address] Hyperlipidemia, and Hypertension. Past Surgical History:  has a past surgical history that includes Coronary artery bypass graft (); Gastric bypass surgery; Cardiac surgery (); Dilatation, esophagus; Cholecystectomy; and Total hip arthroplasty (Left, 3/27/2023). Discharge Recommendations: Lonnie Hsieh scored a 15/24 on the AM-PAC short mobility form. Current research shows that an AM-PAC score of 17 or less is typically not associated with a discharge to the patient's home setting. Based on the patient's AM-PAC score and their current functional mobility deficits, it is recommended that the patient have 3-5 sessions per week of Physical Therapy at d/c to increase the patient's independence. Please see assessment section for further patient specific details. If patient discharges prior to next session this note will serve as a discharge summary. Please see below for the latest assessment towards goals.     DME Required For Discharge: DME to be determined at next level of care, rolling walker  Precautions/Restrictions: high fall risk  Weight Bearing Restrictions: partial weight
Teaching / education initiated regarding perioperative experience, expectations, and pain management during stay. Patient verbalized understanding.
bearing - 25 %    [x] Left Lower Extremity     Required Braces/Orthotics: no braces required   [] Right  [] Left  Positional Restrictions:no positional restrictions (clarification obtained following session - assumed anterior precautions for initial evaluation)    Pre-Admission Information   Lives With: alone                     Type of Home: apartment first floor  Home Layout: one level  Home Access:  1 step to enter without rails   Bathroom Layout: walk in shower  Bathroom Equipment:  no equipment   Toilet Height: standard height  Home Equipment: no prior equipment  Transfer Assistance: Independent without use of device  Ambulation Assistance:Independent without use of device  ADL Assistance: independent with all ADL's  IADL Assistance: independent with homemaking tasks  Active :        [] Yes                 [x] No  Hand Dominance: [] Left                 [x] Right  Current Employment:  hasn't worked since [address] [place] [address] after open heart surgery   Hobbies: puzzles   Recent Falls: 1 fall that resulted in current hospitalization        Subjective  General: Pt was in semi-fowlers position upon arrival. Agreeable to PT/OT   Pain: 0/10  Pain Interventions: not applicable       Functional Mobility  Bed Mobility  Supine to Sit: 2 person assistance with mod A of 2   Scooting: contact guard assistance  Comments: HOB elevated, therapist providing tactile cues for patient to maintain hip precautions, increased time to complete, patient report some increased pain in L hip during scooting   Transfers  Sit to stand transfer: 2 person assistance with min A of 2   Stand to sit transfer: 2 person assistance with min A of 2   Stand step transfer: 2 person assistance with min A of 2 with RW   Comments: with RW; vcues for hand placement; reminders for PWB LLE; pt was better able to maintain 25% WB LLE today for transfers bed to [facility]; with cues and instruction for gait pattern with RW  Ambulation  Assistive Device: rolling
completed. - Disposition: Therapy recommending ARU/SNF ; ok to d/c from our end once medically stable and dispo needs met. Follow-up in two weeks with  [facility] [place].   Office # [phone]    ИВАН Duque CNP  3/29/2023  9:12 AM
completed. - Disposition: await therapy eval; ok to d/c from our end once medically stable and dispo needs met. Follow-up in two weeks with Dr. Jesu Gustafson.   Office # [phone]    ИВАН Otero CNP  3/28/2023  9:04 AM
Impairments Requiring Therapeutic Intervention: decreased functional mobility, decreased ADL status, decreased strength, decreased balance  Prognosis: good  Clinical Assessment: Pt is independent with ADLs and functional mobility at her baseline level of occupational function. Today, pt required Min A for bed mobility, Min A of 2 for functional transfers, and set-up to SBA for seated ADL tasks with AE. Pt had difficulty following PWB status of LLE, despite max verbal cueing. She will benefit from continued skilled acute OT services to address the above deficits and to maximize safety and independence. Safety Interventions: patient left in chair, chair alarm in place, call light within reach, gait belt, patient at risk for falls, and nurse notified    Plan  Frequency: 7 x/week  Current Treatment Recommendations: strengthening, balance training, functional mobility training, transfer training, and ADL/self-care training    Goals  Patient Goals: not stated   Short Term Goals:  Time Frame: discharge  Patient will complete lower body ADL at supervision, w/ AE as needed   Patient will complete toileting at supervision   Patient will complete functional transfers at stand by assistance, w/ RW   Patient will increase functional sitting balance to supervision for improved ADL completion  Patient will increase functional standing balance to SBA w/RW for improved ADL completion    Therapy Session Time     Individual Group Co-treatment   Time In    0835   Time Out    0947   Minutes    72        Timed Code Treatment Minutes:   57 minutes  Total Treatment Minutes:  72 minutes       Electronically Signed By: Frank Dumont 113, S/OT  I have directly observed this treatment and have read and approve this note.    Louise Adams., OTR/L, [id number]
notified    Plan  Frequency: 7 x/week  Current Treatment Recommendations: strengthening, balance training, functional mobility training, transfer training, and ADL/self-care training    Goals  Patient Goals: not stated   Short Term Goals:  Time Frame: discharge  Patient will complete lower body ADL at supervision, w/ AE as needed   Patient will complete toileting at supervision   Patient will complete functional transfers at stand by assistance, w/ RW   Patient will increase functional sitting balance to supervision for improved ADL completion  Patient will increase functional standing balance to SBA w/RW for improved ADL completion    Goals ongoing 3/29/23    Therapy Session Time     Individual Group Co-treatment   Time In    1327   Time Out    1406   Minutes    39        Timed Code Treatment Minutes:  39 minutes  Total Treatment Minutes:  39 minutes total       Electronically Signed By: Alina Mckeon OT, Alina Mckeon OTR/L [id number]

## 2023-03-30 NOTE — CONSULTS
Clinical Pharmacy Note: Pharmacy to Dose Eliquis    Pharmacy consulted by Dr. Prabhu Coats to transition Eliquis dose from 2.5mg BID to 5mg BID starting 3/30/23. Stella Singleton is a 81 y.o. female  is receiving Eliquis for indication: Post-OP Hip and history A-fib (home dose). Prior to admission Eliquis dosing regimen: 5mg twice daily     Dose has been adjusted today per recommendation. Thank you for the consult,     Kari Sparrow, PharmD.   PGY-1 Resident  [id number]  03/30/23 7:20 AM

## 2023-03-31 LAB
ANION GAP SERPL CALCULATED.3IONS-SCNC: 9 MMOL/L (ref 3–16)
BASOPHILS # BLD: 0 K/UL (ref 0–0.2)
BASOPHILS NFR BLD: 0.6 %
BUN SERPL-MCNC: 11 MG/DL (ref 7–20)
CALCIUM SERPL-MCNC: 8.9 MG/DL (ref 8.3–10.6)
CHLORIDE SERPL-SCNC: 100 MMOL/L (ref 99–110)
CO2 SERPL-SCNC: 22 MMOL/L (ref 21–32)
CREAT SERPL-MCNC: <0.5 MG/DL (ref 0.6–1.2)
DEPRECATED RDW RBC AUTO: 13 % (ref 12.4–15.4)
EOSINOPHIL # BLD: 0.1 K/UL (ref 0–0.6)
EOSINOPHIL NFR BLD: 1.1 %
GFR SERPLBLD CREATININE-BSD FMLA CKD-EPI: >60 ML/MIN/{1.73_M2}
GLUCOSE BLD-MCNC: 158 MG/DL (ref 70–99)
GLUCOSE BLD-MCNC: 165 MG/DL (ref 70–99)
GLUCOSE BLD-MCNC: 179 MG/DL (ref 70–99)
GLUCOSE SERPL-MCNC: 155 MG/DL (ref 70–99)
HCT VFR BLD AUTO: 33.7 % (ref 36–48)
HGB BLD-MCNC: 11 G/DL (ref 12–16)
LYMPHOCYTES # BLD: 1.5 K/UL (ref 1–5.1)
LYMPHOCYTES NFR BLD: 19.2 %
MCH RBC QN AUTO: 28.5 PG (ref 26–34)
MCHC RBC AUTO-ENTMCNC: 32.8 G/DL (ref 31–36)
MCV RBC AUTO: 87 FL (ref 80–100)
MONOCYTES # BLD: 0.7 K/UL (ref 0–1.3)
MONOCYTES NFR BLD: 9 %
NEUTROPHILS # BLD: 5.6 K/UL (ref 1.7–7.7)
NEUTROPHILS NFR BLD: 70.1 %
PERFORMED ON: ABNORMAL
PLATELET # BLD AUTO: 318 K/UL (ref 135–450)
PMV BLD AUTO: 6.6 FL (ref 5–10.5)
POTASSIUM SERPL-SCNC: 4.3 MMOL/L (ref 3.5–5.1)
RBC # BLD AUTO: 3.87 M/UL (ref 4–5.2)
SODIUM SERPL-SCNC: 131 MMOL/L (ref 136–145)
WBC # BLD AUTO: 8 K/UL (ref 4–11)

## 2023-03-31 PROCEDURE — 80048 BASIC METABOLIC PNL TOTAL CA: CPT

## 2023-03-31 PROCEDURE — 6370000000 HC RX 637 (ALT 250 FOR IP): Performed by: PHYSICAL MEDICINE & REHABILITATION

## 2023-03-31 PROCEDURE — 1280000000 HC REHAB R&B

## 2023-03-31 PROCEDURE — 97110 THERAPEUTIC EXERCISES: CPT

## 2023-03-31 PROCEDURE — 97530 THERAPEUTIC ACTIVITIES: CPT

## 2023-03-31 PROCEDURE — 2580000003 HC RX 258: Performed by: PHYSICAL MEDICINE & REHABILITATION

## 2023-03-31 PROCEDURE — 97116 GAIT TRAINING THERAPY: CPT

## 2023-03-31 PROCEDURE — 97535 SELF CARE MNGMENT TRAINING: CPT

## 2023-03-31 PROCEDURE — 36415 COLL VENOUS BLD VENIPUNCTURE: CPT

## 2023-03-31 PROCEDURE — 85025 COMPLETE CBC W/AUTO DIFF WBC: CPT

## 2023-03-31 RX ORDER — FERROUS SULFATE 325(65) MG
325 TABLET ORAL 2 TIMES DAILY WITH MEALS
Status: DISPENSED | OUTPATIENT
Start: 2023-03-31

## 2023-03-31 RX ORDER — UBIDECARENONE 75 MG
50 CAPSULE ORAL DAILY
Status: DISPENSED | OUTPATIENT
Start: 2023-03-31

## 2023-03-31 RX ORDER — LANOLIN ALCOHOL/MO/W.PET/CERES
400 CREAM (GRAM) TOPICAL DAILY
Status: DISPENSED | OUTPATIENT
Start: 2023-03-31

## 2023-03-31 RX ADMIN — METOPROLOL TARTRATE 100 MG: 50 TABLET ORAL at 13:36

## 2023-03-31 RX ADMIN — FERROUS SULFATE TAB 325 MG (65 MG ELEMENTAL FE) 325 MG: 325 (65 FE) TAB at 11:21

## 2023-03-31 RX ADMIN — ACETAMINOPHEN 1000 MG: 500 TABLET ORAL at 09:35

## 2023-03-31 RX ADMIN — METFORMIN HYDROCHLORIDE 850 MG: 850 TABLET ORAL at 16:57

## 2023-03-31 RX ADMIN — METOPROLOL TARTRATE 100 MG: 50 TABLET ORAL at 09:35

## 2023-03-31 RX ADMIN — FERROUS SULFATE TAB 325 MG (65 MG ELEMENTAL FE) 325 MG: 325 (65 FE) TAB at 16:57

## 2023-03-31 RX ADMIN — APIXABAN 5 MG: 5 TABLET, FILM COATED ORAL at 09:35

## 2023-03-31 RX ADMIN — METFORMIN HYDROCHLORIDE 850 MG: 850 TABLET ORAL at 11:21

## 2023-03-31 RX ADMIN — Medication 10 ML: at 09:36

## 2023-03-31 RX ADMIN — ACETAMINOPHEN 1000 MG: 500 TABLET ORAL at 13:35

## 2023-03-31 RX ADMIN — NYSTATIN: 100000 CREAM TOPICAL at 09:35

## 2023-03-31 RX ADMIN — Medication 1 TABLET: at 11:21

## 2023-03-31 RX ADMIN — Medication 50 MCG: at 11:22

## 2023-03-31 RX ADMIN — Medication 400 MG: at 11:22

## 2023-03-31 ASSESSMENT — PAIN SCALES - GENERAL: PAINLEVEL_OUTOF10: 0

## 2023-04-01 LAB
GLUCOSE BLD-MCNC: 128 MG/DL (ref 70–99)
GLUCOSE BLD-MCNC: 134 MG/DL (ref 70–99)
PERFORMED ON: ABNORMAL
PERFORMED ON: ABNORMAL

## 2023-04-01 PROCEDURE — 97116 GAIT TRAINING THERAPY: CPT

## 2023-04-01 PROCEDURE — 97110 THERAPEUTIC EXERCISES: CPT

## 2023-04-01 PROCEDURE — 2580000003 HC RX 258: Performed by: PHYSICAL MEDICINE & REHABILITATION

## 2023-04-01 PROCEDURE — 97535 SELF CARE MNGMENT TRAINING: CPT

## 2023-04-01 PROCEDURE — 1280000000 HC REHAB R&B

## 2023-04-01 PROCEDURE — 97530 THERAPEUTIC ACTIVITIES: CPT

## 2023-04-01 PROCEDURE — 6370000000 HC RX 637 (ALT 250 FOR IP): Performed by: PHYSICAL MEDICINE & REHABILITATION

## 2023-04-01 RX ADMIN — NYSTATIN: 100000 CREAM TOPICAL at 00:08

## 2023-04-01 RX ADMIN — METOPROLOL TARTRATE 100 MG: 50 TABLET ORAL at 00:08

## 2023-04-01 RX ADMIN — METOPROLOL TARTRATE 100 MG: 50 TABLET ORAL at 20:50

## 2023-04-01 RX ADMIN — ACETAMINOPHEN 1000 MG: 500 TABLET ORAL at 20:50

## 2023-04-01 RX ADMIN — FERROUS SULFATE TAB 325 MG (65 MG ELEMENTAL FE) 325 MG: 325 (65 FE) TAB at 16:07

## 2023-04-01 RX ADMIN — Medication 50 MCG: at 10:36

## 2023-04-01 RX ADMIN — METFORMIN HYDROCHLORIDE 850 MG: 850 TABLET ORAL at 16:07

## 2023-04-01 RX ADMIN — APIXABAN 5 MG: 5 TABLET, FILM COATED ORAL at 00:08

## 2023-04-01 RX ADMIN — NYSTATIN: 100000 CREAM TOPICAL at 10:42

## 2023-04-01 RX ADMIN — APIXABAN 5 MG: 5 TABLET, FILM COATED ORAL at 10:37

## 2023-04-01 RX ADMIN — FERROUS SULFATE TAB 325 MG (65 MG ELEMENTAL FE) 325 MG: 325 (65 FE) TAB at 10:37

## 2023-04-01 RX ADMIN — APIXABAN 5 MG: 5 TABLET, FILM COATED ORAL at 20:49

## 2023-04-01 RX ADMIN — Medication 10 ML: at 00:09

## 2023-04-01 RX ADMIN — ACETAMINOPHEN 1000 MG: 500 TABLET ORAL at 00:07

## 2023-04-01 RX ADMIN — Medication 400 MG: at 10:37

## 2023-04-01 RX ADMIN — METOPROLOL TARTRATE 100 MG: 50 TABLET ORAL at 14:38

## 2023-04-01 RX ADMIN — Medication 1 TABLET: at 10:37

## 2023-04-01 RX ADMIN — ACETAMINOPHEN 1000 MG: 500 TABLET ORAL at 13:52

## 2023-04-01 RX ADMIN — METOPROLOL TARTRATE 100 MG: 50 TABLET ORAL at 10:36

## 2023-04-01 RX ADMIN — ACETAMINOPHEN 1000 MG: 500 TABLET ORAL at 09:00

## 2023-04-01 RX ADMIN — METFORMIN HYDROCHLORIDE 850 MG: 850 TABLET ORAL at 10:37

## 2023-04-01 RX ADMIN — Medication 10 ML: at 10:38

## 2023-04-01 ASSESSMENT — PAIN SCALES - GENERAL
PAINLEVEL_OUTOF10: 0

## 2023-04-01 ASSESSMENT — PAIN DESCRIPTION - LOCATION: LOCATION: HIP

## 2023-04-02 VITALS
HEIGHT: 60 IN | OXYGEN SATURATION: 95 % | WEIGHT: 164.3 LBS | TEMPERATURE: 97.8 F | SYSTOLIC BLOOD PRESSURE: 123 MMHG | BODY MASS INDEX: 32.26 KG/M2 | RESPIRATION RATE: 16 BRPM | HEART RATE: 61 BPM | DIASTOLIC BLOOD PRESSURE: 74 MMHG

## 2023-04-02 PROCEDURE — 1280000000 HC REHAB R&B

## 2023-04-02 PROCEDURE — 6370000000 HC RX 637 (ALT 250 FOR IP): Performed by: PHYSICAL MEDICINE & REHABILITATION

## 2023-04-02 RX ADMIN — METFORMIN HYDROCHLORIDE 850 MG: 850 TABLET ORAL at 16:54

## 2023-04-02 RX ADMIN — Medication 400 MG: at 08:58

## 2023-04-02 RX ADMIN — METOPROLOL TARTRATE 100 MG: 50 TABLET ORAL at 14:14

## 2023-04-02 RX ADMIN — Medication 1 TABLET: at 08:58

## 2023-04-02 RX ADMIN — FERROUS SULFATE TAB 325 MG (65 MG ELEMENTAL FE) 325 MG: 325 (65 FE) TAB at 16:54

## 2023-04-02 RX ADMIN — APIXABAN 5 MG: 5 TABLET, FILM COATED ORAL at 20:17

## 2023-04-02 RX ADMIN — METOPROLOL TARTRATE 100 MG: 50 TABLET ORAL at 08:58

## 2023-04-02 RX ADMIN — ACETAMINOPHEN 1000 MG: 500 TABLET ORAL at 14:14

## 2023-04-02 RX ADMIN — FERROUS SULFATE TAB 325 MG (65 MG ELEMENTAL FE) 325 MG: 325 (65 FE) TAB at 08:58

## 2023-04-02 RX ADMIN — METOPROLOL TARTRATE 100 MG: 50 TABLET ORAL at 20:17

## 2023-04-02 RX ADMIN — Medication 50 MCG: at 08:57

## 2023-04-02 RX ADMIN — APIXABAN 5 MG: 5 TABLET, FILM COATED ORAL at 08:58

## 2023-04-02 RX ADMIN — NYSTATIN: 100000 CREAM TOPICAL at 08:59

## 2023-04-02 RX ADMIN — ACETAMINOPHEN 1000 MG: 500 TABLET ORAL at 08:58

## 2023-04-02 RX ADMIN — ACETAMINOPHEN 1000 MG: 500 TABLET ORAL at 20:16

## 2023-04-02 RX ADMIN — METFORMIN HYDROCHLORIDE 850 MG: 850 TABLET ORAL at 08:59

## 2023-04-02 ASSESSMENT — PAIN DESCRIPTION - ORIENTATION: ORIENTATION: LEFT

## 2023-04-02 ASSESSMENT — PAIN SCALES - GENERAL
PAINLEVEL_OUTOF10: 0
PAINLEVEL_OUTOF10: 5

## 2023-04-02 ASSESSMENT — PAIN DESCRIPTION - ONSET: ONSET: ON-GOING

## 2023-04-02 ASSESSMENT — PAIN DESCRIPTION - DESCRIPTORS: DESCRIPTORS: ACHING

## 2023-04-02 ASSESSMENT — PAIN DESCRIPTION - FREQUENCY: FREQUENCY: CONTINUOUS

## 2023-04-02 ASSESSMENT — PAIN DESCRIPTION - PAIN TYPE: TYPE: SURGICAL PAIN

## 2023-04-02 ASSESSMENT — PAIN DESCRIPTION - LOCATION: LOCATION: HIP

## 2023-04-03 LAB
ANION GAP SERPL CALCULATED.3IONS-SCNC: 13 MMOL/L (ref 3–16)
BASOPHILS # BLD: 0.1 K/UL (ref 0–0.2)
BASOPHILS NFR BLD: 0.5 %
BUN SERPL-MCNC: 13 MG/DL (ref 7–20)
CALCIUM SERPL-MCNC: 9.2 MG/DL (ref 8.3–10.6)
CHLORIDE SERPL-SCNC: 97 MMOL/L (ref 99–110)
CO2 SERPL-SCNC: 20 MMOL/L (ref 21–32)
CREAT SERPL-MCNC: 0.6 MG/DL (ref 0.6–1.2)
DEPRECATED RDW RBC AUTO: 13.2 % (ref 12.4–15.4)
EOSINOPHIL # BLD: 0.1 K/UL (ref 0–0.6)
EOSINOPHIL NFR BLD: 1.4 %
GFR SERPLBLD CREATININE-BSD FMLA CKD-EPI: >60 ML/MIN/{1.73_M2}
GLUCOSE SERPL-MCNC: 100 MG/DL (ref 70–99)
HCT VFR BLD AUTO: 32.7 % (ref 36–48)
HGB BLD-MCNC: 10.8 G/DL (ref 12–16)
LYMPHOCYTES # BLD: 1.6 K/UL (ref 1–5.1)
LYMPHOCYTES NFR BLD: 16.2 %
MCH RBC QN AUTO: 29.2 PG (ref 26–34)
MCHC RBC AUTO-ENTMCNC: 33.1 G/DL (ref 31–36)
MCV RBC AUTO: 88.2 FL (ref 80–100)
MONOCYTES # BLD: 0.8 K/UL (ref 0–1.3)
MONOCYTES NFR BLD: 8.2 %
NEUTROPHILS # BLD: 7.3 K/UL (ref 1.7–7.7)
NEUTROPHILS NFR BLD: 73.7 %
PLATELET # BLD AUTO: 474 K/UL (ref 135–450)
PMV BLD AUTO: 6.6 FL (ref 5–10.5)
POTASSIUM SERPL-SCNC: 4.6 MMOL/L (ref 3.5–5.1)
RBC # BLD AUTO: 3.71 M/UL (ref 4–5.2)
SODIUM SERPL-SCNC: 130 MMOL/L (ref 136–145)
WBC # BLD AUTO: 9.9 K/UL (ref 4–11)

## 2023-04-03 ASSESSMENT — PAIN SCALES - GENERAL
PAINLEVEL_OUTOF10: 0

## 2023-04-04 ASSESSMENT — PAIN SCALES - GENERAL
PAINLEVEL_OUTOF10: 0

## 2023-04-05 LAB
ANION GAP SERPL CALCULATED.3IONS-SCNC: 16 MMOL/L (ref 3–16)
BASOPHILS # BLD: 0.1 K/UL (ref 0–0.2)
BASOPHILS NFR BLD: 1.2 %
BUN SERPL-MCNC: 13 MG/DL (ref 7–20)
CALCIUM SERPL-MCNC: 9.4 MG/DL (ref 8.3–10.6)
CHLORIDE SERPL-SCNC: 95 MMOL/L (ref 99–110)
CO2 SERPL-SCNC: 20 MMOL/L (ref 21–32)
CREAT SERPL-MCNC: 0.6 MG/DL (ref 0.6–1.2)
DEPRECATED RDW RBC AUTO: 13.2 % (ref 12.4–15.4)
EOSINOPHIL # BLD: 0.1 K/UL (ref 0–0.6)
EOSINOPHIL NFR BLD: 1.1 %
GFR SERPLBLD CREATININE-BSD FMLA CKD-EPI: >60 ML/MIN/{1.73_M2}
GLUCOSE SERPL-MCNC: 106 MG/DL (ref 70–99)
HCT VFR BLD AUTO: 33.2 % (ref 36–48)
HGB BLD-MCNC: 10.8 G/DL (ref 12–16)
LYMPHOCYTES # BLD: 1.2 K/UL (ref 1–5.1)
LYMPHOCYTES NFR BLD: 17.1 %
MCH RBC QN AUTO: 28.6 PG (ref 26–34)
MCHC RBC AUTO-ENTMCNC: 32.7 G/DL (ref 31–36)
MCV RBC AUTO: 87.6 FL (ref 80–100)
MONOCYTES # BLD: 0.6 K/UL (ref 0–1.3)
MONOCYTES NFR BLD: 8 %
NEUTROPHILS # BLD: 5.2 K/UL (ref 1.7–7.7)
NEUTROPHILS NFR BLD: 72.6 %
PLATELET # BLD AUTO: 520 K/UL (ref 135–450)
PMV BLD AUTO: 6.3 FL (ref 5–10.5)
POTASSIUM SERPL-SCNC: 4.4 MMOL/L (ref 3.5–5.1)
RBC # BLD AUTO: 3.79 M/UL (ref 4–5.2)
SODIUM SERPL-SCNC: 131 MMOL/L (ref 136–145)
WBC # BLD AUTO: 7.2 K/UL (ref 4–11)

## 2023-04-05 ASSESSMENT — PAIN SCALES - GENERAL
PAINLEVEL_OUTOF10: 0

## 2023-04-05 ASSESSMENT — PAIN DESCRIPTION - ORIENTATION: ORIENTATION: LEFT

## 2023-04-05 ASSESSMENT — PAIN DESCRIPTION - DESCRIPTORS: DESCRIPTORS: ACHING

## 2023-04-06 ASSESSMENT — PAIN SCALES - GENERAL
PAINLEVEL_OUTOF10: 0

## 2023-04-07 LAB
ANION GAP SERPL CALCULATED.3IONS-SCNC: 12 MMOL/L (ref 3–16)
BASOPHILS # BLD: 0 K/UL (ref 0–0.2)
BASOPHILS NFR BLD: 0.5 %
BUN SERPL-MCNC: 17 MG/DL (ref 7–20)
CALCIUM SERPL-MCNC: 9.1 MG/DL (ref 8.3–10.6)
CHLORIDE SERPL-SCNC: 96 MMOL/L (ref 99–110)
CO2 SERPL-SCNC: 20 MMOL/L (ref 21–32)
CREAT SERPL-MCNC: 0.6 MG/DL (ref 0.6–1.2)
DEPRECATED RDW RBC AUTO: 13.7 % (ref 12.4–15.4)
EOSINOPHIL # BLD: 0.1 K/UL (ref 0–0.6)
EOSINOPHIL NFR BLD: 1.5 %
GFR SERPLBLD CREATININE-BSD FMLA CKD-EPI: >60 ML/MIN/{1.73_M2}
GLUCOSE SERPL-MCNC: 102 MG/DL (ref 70–99)
HCT VFR BLD AUTO: 35.3 % (ref 36–48)
HGB BLD-MCNC: 11.3 G/DL (ref 12–16)
LYMPHOCYTES # BLD: 1.4 K/UL (ref 1–5.1)
LYMPHOCYTES NFR BLD: 17.1 %
MCH RBC QN AUTO: 29.5 PG (ref 26–34)
MCHC RBC AUTO-ENTMCNC: 32.1 G/DL (ref 31–36)
MCV RBC AUTO: 92.1 FL (ref 80–100)
MONOCYTES # BLD: 0.6 K/UL (ref 0–1.3)
MONOCYTES NFR BLD: 7.3 %
NEUTROPHILS # BLD: 6.2 K/UL (ref 1.7–7.7)
NEUTROPHILS NFR BLD: 73.6 %
PLATELET # BLD AUTO: 514 K/UL (ref 135–450)
PMV BLD AUTO: 6.1 FL (ref 5–10.5)
POTASSIUM SERPL-SCNC: 4.4 MMOL/L (ref 3.5–5.1)
RBC # BLD AUTO: 3.83 M/UL (ref 4–5.2)
SODIUM SERPL-SCNC: 128 MMOL/L (ref 136–145)
WBC # BLD AUTO: 8.4 K/UL (ref 4–11)

## 2023-04-07 ASSESSMENT — PAIN SCALES - GENERAL
PAINLEVEL_OUTOF10: 0
PAINLEVEL_OUTOF10: 0

## 2023-04-08 ASSESSMENT — PAIN SCALES - GENERAL
PAINLEVEL_OUTOF10: 6
PAINLEVEL_OUTOF10: 4
PAINLEVEL_OUTOF10: 0

## 2023-04-08 ASSESSMENT — PAIN DESCRIPTION - DESCRIPTORS
DESCRIPTORS: ACHING
DESCRIPTORS: ACHING

## 2023-04-08 ASSESSMENT — PAIN DESCRIPTION - ORIENTATION
ORIENTATION: LOWER
ORIENTATION: LOWER

## 2023-04-08 ASSESSMENT — PAIN DESCRIPTION - LOCATION
LOCATION: BACK
LOCATION: BACK

## 2023-04-09 ASSESSMENT — PAIN SCALES - GENERAL
PAINLEVEL_OUTOF10: 5
PAINLEVEL_OUTOF10: 3

## 2023-04-09 ASSESSMENT — PAIN DESCRIPTION - DESCRIPTORS
DESCRIPTORS: OTHER (COMMENT)
DESCRIPTORS: ACHING

## 2023-04-09 ASSESSMENT — PAIN DESCRIPTION - LOCATION
LOCATION: GENERALIZED
LOCATION: BACK

## 2023-04-09 ASSESSMENT — PAIN DESCRIPTION - ORIENTATION
ORIENTATION: LOWER
ORIENTATION: OTHER (COMMENT)

## 2023-04-09 ASSESSMENT — PAIN DESCRIPTION - FREQUENCY: FREQUENCY: CONTINUOUS

## 2023-04-09 ASSESSMENT — PAIN DESCRIPTION - PAIN TYPE: TYPE: CHRONIC PAIN

## 2023-04-09 ASSESSMENT — PAIN - FUNCTIONAL ASSESSMENT: PAIN_FUNCTIONAL_ASSESSMENT: ACTIVITIES ARE NOT PREVENTED

## 2023-04-09 ASSESSMENT — PAIN DESCRIPTION - ONSET: ONSET: ON-GOING

## 2023-04-10 ASSESSMENT — PAIN SCALES - GENERAL
PAINLEVEL_OUTOF10: 1
PAINLEVEL_OUTOF10: 1
PAINLEVEL_OUTOF10: 0

## 2023-04-10 ASSESSMENT — PAIN DESCRIPTION - DESCRIPTORS
DESCRIPTORS: ACHING
DESCRIPTORS: ACHING

## 2023-04-10 ASSESSMENT — PAIN DESCRIPTION - LOCATION
LOCATION: BACK
LOCATION: BACK

## 2023-04-10 ASSESSMENT — PAIN DESCRIPTION - ORIENTATION
ORIENTATION: LOWER
ORIENTATION: LOWER

## 2023-04-10 NOTE — PATIENT CARE CONFERENCE
Order: Regular, 1800 ml fluid restriction    Supplements:Ensure HP, TID    Po intake consistently greater than 75% of meals. Please see nutrition note for details. NURSING:    Risk for Readmission: 15%    Riggins Fall Risk Score: High  Wounds/Incisions/Ulcers: Left hip incision, Left gluteal skin tear, redness to heels  Medication Review: Reviewed daily with patient  Pain: Chronic low back pain managed with prescribed medication  Consultations/Labs/X-rays: Labs MWF    Patient/Family Education provided by team:    Discharge Plan   Estimated Length of Stay:1 days  Destination: SNF  Pass:No  Services at Discharge: PT, OT  Equipment at Discharge: w/c  Factors facilitating achievement of predicted outcomes: cooperative, pleasant  Barriers to the achievement of predicted outcomes: 25% WB restriction    Patient Goals:  Return to PLOF. Interdisciplinary Individualized Plan of Care Review of Previous Week:    Medical and functional progress towards goals:  Medically the patient is doing well, incision is clean, scant drainage, labs an vitals stable, ready for discharge to SNF tomorrow. Improving with w/c mobility and transfers; good use and carryover of AE for LB ADLs, still unable to maintain WBing with ambulation. Static standing balance progressing, weight bearing improved in static standing. Pt fatigues quickly. Barriers towards progress:  unable to maintain appropriate WB status during ambulation and transfers   Interventions to address Barriers:  maintain w/c level mobility, educate on WBing status, continue standing balance and mobility training  Goals still appropriate:  Yes  Modifications to goals: None  Continue Current Plan of Care: No  Modifications to Plan of Care: Change discharge disposition to SNF.     Rehab Team Members in attendance for Team Conference:  Kevin Baldwin MSW, LSW    Jacy Arauz RD, LD    AQUILINO Blair/BREEZY Leyva, PT, DPT    JESÚS Day PT,

## 2023-04-11 ASSESSMENT — PAIN - FUNCTIONAL ASSESSMENT: PAIN_FUNCTIONAL_ASSESSMENT: ACTIVITIES ARE NOT PREVENTED

## 2023-04-11 ASSESSMENT — PAIN DESCRIPTION - DESCRIPTORS: DESCRIPTORS: ACHING

## 2023-04-11 ASSESSMENT — PAIN SCALES - GENERAL
PAINLEVEL_OUTOF10: 5
PAINLEVEL_OUTOF10: 5

## 2023-04-11 ASSESSMENT — PAIN DESCRIPTION - LOCATION: LOCATION: BACK

## 2023-04-11 ASSESSMENT — PAIN DESCRIPTION - PAIN TYPE: TYPE: CHRONIC PAIN

## 2023-04-11 ASSESSMENT — PAIN DESCRIPTION - ORIENTATION: ORIENTATION: LOWER

## 2023-04-11 NOTE — CARE COORDINATION
EVON re-faxed ([phone]) patient's referral to [facility]. Original referral was sent via Epic 4/10/2023 and failed. EVON spoke with Anay Maldonado, Admission Coordinator @ [facility]. Anay Maldonado reporting that facility is having issues with phones and e-mail system. EVON will continue to follow. Electronically signed by ERIBERTO Coats on 4/11/2023 at 2:08 PM     ADDENDUM:    EVON spoke with Anay Maldonado, Admission Coordinator @ [facility]. Anay Maldonado reporting that facility will accept patient when patient is medically stable for discharge. EVON informed patient and patient's RN.     Electronically signed by ERIBERTO Coats on 4/11/2023 at 4:06 PM

## 2023-04-11 NOTE — CARE COORDINATION
04/11/23 1559   IMM Letter   IMM Letter given to Patient/Family/Significant other/Guardian/POA/by: 2nd IMM Letter given to patient by ERIBERTO Lopez-SW-CM.    IMM Letter date given: 04/11/23   IMM Letter time given: 3467

## 2023-04-11 NOTE — DISCHARGE INSTRUCTIONS
We hope your stay on rehab has exceeded your expectations. Once again the entire Acute Rehab Staff at [facility] wish to thank you for allowing us the privilege to care for you. A few days after you are discharged from Rehab, you will receive a survey [employer]) in the mail. This is a nationally distributed survey sent to thousands of rehab patients throughout the nation. It is very important to the staff and Dr. Winston Spicer to receive feedback based on your experience on the Rehab Unit. Thank you, we wish you good health always,         Acute Rehab Team      Hospital Preference:     [facility] [address] Diagnosis/Conditions    _______________________ (free text)    Emergency Contact:    ________________________________________Phone#________________________      Advanced Directives:    Code Status: ?  []  Full Code  ? []  DNR  ? []  [facility]  ? []  [facility]    (as of date of discharge:  _________)      Medical POA: ?  []   Yes ______________________________ ?   []   No                                       (Name and phone number)                     Living Will:   ?   []   Yes    ?  []   No        Insurance Information:    _______________________ (free text)      Individual Responsible  for the coordination of the discharge/follow up:    ______________________________________________________    Functional Status:    VISUAL DEFICITS:    Yes []  No  []       If yes, assisted device:   Wears Glasses Yes []  No  []  Wears Contacts  Yes []  No  []  Legally Blind Yes []  No  []    HEARING DEFICITS:    Yes []  No  []       If yes, assisted device:   Wears Hearing Aids Yes []  No  []  Pocket Talker  Yes []  No  []       Physical Therapist & Contact #: Giancarlo Doherty -[phone]  OccupationalTherapist & Contact #:  Speech Therapist & Contact #:       Activities of Daily Living:     ADL's - Adaptive Equipment used shower chair, hand held shower head, sock

## 2023-04-11 NOTE — DISCHARGE INSTR - COC
Continuity of Care Form    Patient Name: Michael Davis   :  1950  MRN:  [id number]    Admit date:  3/29/2023  Discharge date:  2023    Code Status Order: Full Code   Advance Directives:     Admitting Physician:  Daniel Hoskins DO  PCP: Sara Mendez    Discharging Nurse: Les Unit/Room#: QQY-1100/3052-61  [address] [facility] Unit Phone Number: [phone]    Emergency Contact:   Extended Emergency Contact Information  Primary Emergency Contact: Jazlyn Case  Home Phone: [phone]  Relation: Other  Secondary Emergency Contact: [address] Phone: [phone]  Relation: Other    Past Surgical History:  Past Surgical History:   Procedure Laterality Date    CARDIAC SURGERY  2001    stent    CHOLECYSTECTOMY      in [address], ESOPHAGUS      gastric bypass    GASTRIC BYPASS SURGERY      TOTAL HIP ARTHROPLASTY Left 2023    LEFT HIP TOTAL ARTHROPLASTY ANTERIOR APPROACH performed by Cash Noel MD at [address]       Immunization History:   Immunization History   Administered Date(s) Administered    COVID-19, PFIZER PURPLE top, DILUTE for use, (age 15 y+), 30mcg/0.3mL 2021, 2021, 2022       Active Problems:  Patient Active Problem List   Diagnosis Code    CAD (coronary artery disease) I25.10    HTN (hypertension), benign I10    Dyslipidemia E78.5    Bruit of right carotid artery R09.89    DM (diabetes mellitus), type 2, uncontrolled with complications [id number]    Tobacco abuse Z72.0    PAF (paroxysmal atrial fibrillation) ([facility]) I48.0    Class 1 obesity due to excess calories with serious comorbidity and body mass index (BMI) of 30.0 to 30.9 in adult E66.09, Z68.30    Acute CVA (cerebrovascular accident) ([facility] [address] I63.9    Encounter for electronic analysis of reveal event recorder Z45.09    Fracture of base of femoral neck (cervicotrochanteric) open, left, sequela S72.042S    Closed fracture of neck of

## 2023-04-11 NOTE — CARE COORDINATION
Team conference held today. Spoke with patient to discuss progress with therapy, barriers to discharge, and plans to return home. Estimated discharge date set for 4/12/2023. Patient anticipates discharging to a SNF. Will continue to follow for support and discharge planning.     Electronically signed by ERIBERTO Alvarez on 4/11/2023 at 4:17 PM

## 2023-04-12 ASSESSMENT — PAIN SCALES - GENERAL: PAINLEVEL_OUTOF10: 0

## 2023-04-12 NOTE — PLAN OF CARE
Problem: Discharge Planning  Goal: Discharge to home or other facility with appropriate resources  3/30/2023 1145 by Blaine Oconnell RN  Outcome: Progressing     Problem: Safety - Adult  Goal: Free from fall injury  3/30/2023 1145 by Blaine Oconnell, RN  Outcome: Progressing
Problem: Discharge Planning  Goal: Discharge to home or other facility with appropriate resources  Outcome: Progressing     Problem: Safety - Adult  Goal: Free from fall injury  4/11/2023 1103 by Cb Whitehead RN  Outcome: Progressing  4/11/2023 0620 by Donna Yang RN  Outcome: Progressing     Problem: Chronic Conditions and Co-morbidities  Goal: Patient's chronic conditions and co-morbidity symptoms are monitored and maintained or improved  Outcome: Progressing     Problem: Pain  Goal: Verbalizes/displays adequate comfort level or baseline comfort level  4/11/2023 1103 by Cb Whitehead RN  Outcome: Progressing  4/11/2023 0620 by Donna Yang RN  Outcome: Progressing     Problem: Skin/Tissue Integrity  Goal: Absence of new skin breakdown  Description: 1. Monitor for areas of redness and/or skin breakdown  2. Assess vascular access sites hourly  3. Every 4-6 hours minimum:  Change oxygen saturation probe site  4. Every 4-6 hours:  If on nasal continuous positive airway pressure, respiratory therapy assess nares and determine need for appliance change or resting period.   4/11/2023 1103 by Cb Whitehead RN  Outcome: Progressing  4/11/2023 0620 by Donna Yang RN  Outcome: Progressing     Problem: ABCDS Injury Assessment  Goal: Absence of physical injury  4/11/2023 1103 by Cb Whitehead RN  Outcome: Progressing  4/11/2023 0620 by Donna Yang RN  Outcome: Progressing
Problem: Discharge Planning  Goal: Discharge to home or other facility with appropriate resources  Outcome: Progressing     Problem: Safety - Adult  Goal: Free from fall injury  Outcome: Progressing
Problem: Discharge Planning  Goal: Discharge to home or other facility with appropriate resources  Outcome: Progressing     Problem: Safety - Adult  Goal: Free from fall injury  Outcome: Progressing     Problem: Chronic Conditions and Co-morbidities  Goal: Patient's chronic conditions and co-morbidity symptoms are monitored and maintained or improved  Outcome: Progressing
Problem: Discharge Planning  Goal: Discharge to home or other facility with appropriate resources  Outcome: Progressing  Flowsheets  Taken 3/29/2023 2054 by Asif Barber RN  Discharge to home or other facility with appropriate resources: Identify barriers to discharge with patient and caregiver  Taken 3/29/2023 2042 by Dwayne Oconnell RN  Discharge to home or other facility with appropriate resources: Identify barriers to discharge with patient and caregiver     Problem: Safety - Adult  Goal: Free from fall injury  Outcome: Progressing  Flowsheets (Taken 3/30/2023 0507)  Free From Fall Injury: Instruct family/caregiver on patient safety
Problem: Discharge Planning  Goal: Discharge to home or other facility with appropriate resources  Outcome: Progressing  Flowsheets (Taken 4/9/2023 2122 by Nelly Redd RN)  Discharge to home or other facility with appropriate resources: Identify discharge learning needs (meds, wound care, etc)     Problem: Safety - Adult  Goal: Free from fall injury  4/10/2023 0942 by Keegan Oconnell RN  Outcome: Progressing     Problem: Chronic Conditions and Co-morbidities  Goal: Patient's chronic conditions and co-morbidity symptoms are monitored and maintained or improved  4/10/2023 0942 by Keegan Oconnell RN  Outcome: Progressing     Problem: Pain  Goal: Verbalizes/displays adequate comfort level or baseline comfort level  4/10/2023 0942 by Keegan Oconnell RN  Outcome: Progressing     Problem: Skin/Tissue Integrity  Goal: Absence of new skin breakdown  Description: 1. Monitor for areas of redness and/or skin breakdown  2. Assess vascular access sites hourly  3. Every 4-6 hours minimum:  Change oxygen saturation probe site  4. Every 4-6 hours:  If on nasal continuous positive airway pressure, respiratory therapy assess nares and determine need for appliance change or resting period.   4/10/2023 0942 by Keegan Oconnell RN  Outcome: Progressing     Problem: ABCDS Injury Assessment  Goal: Absence of physical injury  4/10/2023 0942 by Keegan Oconnell RN  Outcome: Progressing
Problem: Safety - Adult  Goal: Free from fall injury  Outcome: Progressing     Problem: Chronic Conditions and Co-morbidities  Goal: Patient's chronic conditions and co-morbidity symptoms are monitored and maintained or improved  Outcome: Progressing  Flowsheets (Taken 4/9/2023 2122)  Care Plan - Patient's Chronic Conditions and Co-Morbidity Symptoms are Monitored and Maintained or Improved:   Monitor and assess patient's chronic conditions and comorbid symptoms for stability, deterioration, or improvement   Collaborate with multidisciplinary team to address chronic and comorbid conditions and prevent exacerbation or deterioration   Update acute care plan with appropriate goals if chronic or comorbid symptoms are exacerbated and prevent overall improvement and discharge     Problem: Pain  Goal: Verbalizes/displays adequate comfort level or baseline comfort level  Outcome: Progressing  Flowsheets (Taken 4/9/2023 2122)  Verbalizes/displays adequate comfort level or baseline comfort level: Encourage patient to monitor pain and request assistance     Problem: Skin/Tissue Integrity  Goal: Absence of new skin breakdown  Description: 1. Monitor for areas of redness and/or skin breakdown  2. Assess vascular access sites hourly  3. Every 4-6 hours minimum:  Change oxygen saturation probe site  4. Every 4-6 hours:  If on nasal continuous positive airway pressure, respiratory therapy assess nares and determine need for appliance change or resting period.   Outcome: Progressing     Problem: ABCDS Injury Assessment  Goal: Absence of physical injury  Outcome: Progressing  Flowsheets (Taken 4/10/2023 7424)  Absence of Physical Injury: Implement safety measures based on patient assessment
Problem: Safety - Adult  Goal: Free from fall injury  Outcome: Progressing     Problem: Pain  Goal: Verbalizes/displays adequate comfort level or baseline comfort level  Outcome: Progressing     Problem: Skin/Tissue Integrity  Goal: Absence of new skin breakdown  Description: 1. Monitor for areas of redness and/or skin breakdown  2. Assess vascular access sites hourly  3. Every 4-6 hours minimum:  Change oxygen saturation probe site  4. Every 4-6 hours:  If on nasal continuous positive airway pressure, respiratory therapy assess nares and determine need for appliance change or resting period.   Outcome: Progressing     Problem: ABCDS Injury Assessment  Goal: Absence of physical injury  Outcome: Progressing
Patient Goals: to return to PLOF   Short Term Goals:  Time Frame: 10-14 days   Patient will complete upper body ADL at modified independent   Patient will complete lower body ADL at modified independent   Patient will complete toileting at modified independent   Patient will complete functional transfers at modified independent   Patient to maintain standing at modified independent for 5 minutes. Patient to gather and transport IADL items at modified independent       These goals were reviewed with this patient at the time of assessment and Lia Gowers is in agreement    Plan of Care:  Pt to be seen 5 out of 7 days per week per ARU protocol ( 90 minutes with OT)       Therapy Treatments will include:  [x]  therapeutic exercises    [x]  gait training     []  neuromuscular re-ed                            [x]  transfer training             [] community reintegration    [x] bed mobility                          [x]  w/c mobility and training  [x]  self care    [x]home mgmt    []  cognitive training            [x]  energy conservation        []  dysphagia tx    []  speech/language/communication therapy   []  group therapy    [x]  patient/family education    [] Other:    CASE MANAGEMENT:  Goals:  Assist patient/family with discharge planning, patient/family counseling, and coordination with insurance during ARU stay. Lia Gowers will be seen a minimum of 3 hours of therapy per day, a minimum of 5 out of 7 days per week  (please see above for specific treatment plan per PT/OT/SLP). [] In this rare instance due to the nature of this patient's medical involvement, this patient will be seen 15 hours per week (900 minutes within a 7 day period). In addition, dietician/nutritionist may monitor calorie count as well as intake and collaboratively work with SLP on dietary upgrades. Neuropsychology/Psychology may evaluate and provide necessary support.     Medical issues being managed closely and that

## 2023-04-12 NOTE — DISCHARGE SUMMARY
Physical Medicine & Rehabilitation  Discharge Summary     Patient Identification:  Abraham Goldberg  : 1950  Admit date: 3/29/2023  Discharge date:    Attending provider: Hong Pritchett DO        Primary care provider: Breanna Mccauley     Discharge Diagnoses:   Patient Active Problem List   Diagnosis    CAD (coronary artery disease)    HTN (hypertension), benign    Dyslipidemia    Bruit of right carotid artery    DM (diabetes mellitus), type 2, uncontrolled with complications    Tobacco abuse    PAF (paroxysmal atrial fibrillation) ([facility])    Class 1 obesity due to excess calories with serious comorbidity and body mass index (BMI) of 30.0 to 30.9 in adult    Acute CVA (cerebrovascular accident) ([facility] [address]    Encounter for electronic analysis of reveal event recorder    Fracture of base of femoral neck (cervicotrochanteric) open, left, sequela    Closed fracture of neck of left femur ([facility] [address]    Closed left hip fracture, sequela       Discharge Functional Status:      Physical therapy:  Supine to Sit: Supervision or touching assistance  Sit to Supine: Supervision or touching assistance      Sit to Stand: Supervision or touching assistance  Chair/Bed to Chair Transfer: Supervision or touching assistance  Car Transfer: Supervision or touching assistance     Ambulation 10 ft: Supervision or touching assistance  Ambulation 50 ft:    Ambulation 150 ft:    Stairs - 1 Step:    Stairs - 4 Step:    Stairs - 12 Step:      Occupational therapy:  Eating: Independent  Oral Hygiene: Setup or clean-up assistance  Bathing: Partial/moderate assistance  Upper Body Dressing: Setup or clean-up assistance  Lower Body Dressing: Partial/moderate assistance     Toilet Transfer: Supervision or touching assistance  Toilet Hygiene: Supervision or touching assistance    Speech therapy:         Inpatient Rehabilitation Course:   Abraham Goldberg is a 81 y.o. female admitted to inpatient rehabilitation on 3/29/2023 s/p Closed left hip fracture,
no

## 2023-04-12 NOTE — PROGRESS NOTES
4 Eyes Skin Assessment     NAME:  Jacob Raymundo  YOB: 1950  MEDICAL RECORD NUMBER:  [id number]    The patient is being assessed for  Admission    I agree that One RN has performed a thorough Head to Toe Skin Assessment on the patient. ALL assessment sites listed below have been assessed. Areas assessed by both nurses:    Head, Face, Ears, Shoulders, Back, Chest, Arms, Elbows, Hands, Sacrum. Buttock, Coccyx, Ischium, and Legs. Feet and Heels        Does the Patient have a Wound? Other irritation on right and left abdomen fold.   Left hip surgical incision       Low Prevention initiated by RN: Yes   Wound Care Orders initiated by RN: NA    Pressure Injury (Stage 3,4, Unstageable, DTI, NWPT, and Complex wounds) if present, place referral order by RN under : NA    New and Established Ostomies, if present place, referral order under : NA      Nurse 1 eSignature: Electronically signed by Joseph Zavala RN on 3/29/23 at 7:13 PM EDT    **SHARE this note so that the co-signing nurse can place an eSignature**    Nurse 2 eSignature: Electronically signed by Delonte Sin RN on 3/30/23 at 8:22 AM EDT
ARU Discharge Assessment    Transportation  \"Has lack of transportation kept you from medical appointments, meetings, work, or from getting things needed for daily living? \"Check all that apply:  [] A.  Yes, it has kept me from medical appointments or from getting my medications  [] B.  Yes, it has kept me from non-medical meetings, appointments, work, or from getting things that I need  [x] C.  No  [] X. Patient unable to respond  [] Y. Patient declines to respond    Provision of Current Reconciled Medication List to Subsequent Provider at Discharge  [] No, current reconciled medication list not provided to the subsequent provider. [x] Yes, current reconciled medication list provided to the subsequent provider. (**Select route of transmission below**)   [x] Via Electronic Health Record   [] [employer]  [] Verbal (e.g. in person, telephone, video conferencing)  [] Paper-based (e.g. fax, copies, printouts)   [] Other Methods (e.g. texting, email, CDs)    Provision of Current Reconciled Medication List to Patient at Discharge  [] No, current reconciled medication list not provided to the patient, family and/or caregiver. [x] Yes, current reconciled medication list provided to the patient, family and/or caregiver.  (**Select route of transmission below**)   [] Via Electronic Health Record (e.g., electronic access to patient portal)   [] Via [employer]  [x] Verbal (e.g. in person, telephone, video conferencing)  [x] Paper-based (e.g. fax, copies, printouts)   [] Other Methods (e.g. texting, email, CDs)    Health Literacy  \"How often do you need to have someone help you when you read instructions, pamphlets, or other written material from your doctor or pharmacy? \"  []  0. Never  [x]  1. Rarely  []  2. Sometimes  []  3. Often  []  4. Always  []  8.   Patient unable to respond    BIMS - **Must be completed in the flowsheet at discharge prior to
Admission Period/Goal QM Codes for [employer]. QM Admit Code Goal Code   Eating 6 6   Oral Hygiene 4 6   Toileting Hygiene 2 6   Shower/Bathing 3 6   UB Dressing 4 6   LB Dressing 1 6   Putting on/off Footwear 2 6   Rolling Left and Right 2 6   Sit To Lying 3 6   Lying to Sitting on Bedside 1 6   Sit to Stand 3 6   Chair/Bed to Chair Transfer 3 6   Toilet Transfers 3 6   Car Transfers 88 6   Walk 10 Feet 3 6   Walk 50 Feet with Two Turns 88 6   Walk 150 Feet 88 6   Walk 10 Feet on Uneven Surfaces 88 6   1 Step (Curb) 88 6   4 Steps 88 6   12 Steps 80 6   Picking up Object from Floor 88 6   Wheel 50 Feet with 2 Turns     Type     Wheel 150 Feet     Type         The above codes were determined by the treatment team to be the patient's accurate admission assessment codes based on assessment performed soon after the patient's admission and prior to the benefit of services provided by staff, or if appropriate, the patient's usual performance at admission. OT:  Junior Holliday, OTR/L, [id number] 3/31/23     PT:  Juhi Burton PT, DPT [id number], 3/31/23, 1243     RN:  Duyen Palm 03/31/2023, 1206.     :  Андрей Iraheta PT, [place] [id number]  4/2/23  10:07 AM
Assessment complete and charted. Pt without needs overnight other than assistance getting to restroom. VSS on RA. Incision with scant drainage to top of dressing- no change since dressing removed by AM RN. Call light within reach, will continue to monitor.
Assessment complete. Alert, oriented x4. Reports chronic generalized arthritic pain. Patient states that scheduled acetaminophen should be sufficient in bringing her relief. States that she not had any pain, only numbness near the surgical site at her left hip. Dressing over site clean, dry, intact. The care plan and education has been reviewed and mutually agreed upon with the patient. In bed, alarm on, bed in lowest position, call light and table within reach. No further needs expressed at this time.
Assessment completed. Patient sitting up in chair, alert and oriented x 4 and able to make all her needs known. C/o arthritic pain to back. Incision to left hip well approximated, clean and intact with scant amount of drainage noted at proximal end. Band-Aid in place. Medications administered as ordered. POC and education reviewed with patient and mutually agreed upon. Safety interventions in place. Able to stand and pivot. Continent of B&B. All needs met at this time. Call light in reach. Will continue to monitor.
Called [facility] and gave report to nurse [facility].
Daxa Cardona Meter  4/10/2023  [id number]    Chief Complaint: Closed left hip fracture, sequela    Subjective:  Seen in her room this morning with therapy. She continues to be very tired and wants to sleep all the time. When awake she is talkative and states that sleeping is just \" her nature\". No new complaints overnight. Working hard in therapy today. ROS: No CP, SOB, dyspnea    Objective:  Patient Vitals for the past 24 hrs:   BP Temp Temp src Pulse Resp SpO2 Weight   04/10/23 0555 -- -- -- -- -- -- 157 lb 14.4 oz (71.6 kg)   04/09/23 2122 112/76 97.4 °F (36.3 °C) Oral 74 18 97 % --   04/09/23 0842 111/66 98.2 °F (36.8 °C) Oral 92 16 98 % --       Gen: No distress, pleasant. HEENT: Normocephalic, atraumatic. CV: Regular rate and rhythm. No MRG   Resp: No respiratory distress. CTAB   Abd: Soft, nontender   Ext: + edema. Neuro: Alert, oriented, appropriately interactive. Laboratory data: Available via EMR. Therapy progress:       PT    Supine to Sit: Supervision or touching assistance  Sit to Supine: Partial/moderate assistance   Sit to Stand: Partial/moderate assistance  Chair/Bed to Chair Transfer: Partial/moderate assistance  Car Transfer:    Ambulation 10 ft: Supervision or touching assistance  Ambulation 50 ft:    Ambulation 150 ft:    Stairs - 1 Step:    Stairs - 4 Step:    Stairs - 12 Step:      OT    Eating: Independent  Oral Hygiene: Independent  Bathing: Partial/moderate assistance  Upper Body Dressing: Setup or clean-up assistance  Lower Body Dressing: Partial/moderate assistance  Toilet Transfer: Partial/moderate assistance  Toilet Hygiene: Partial/moderate assistance    Speech Therapy         Body mass index is 30.84 kg/m².     Assessment:  Patient Active Problem List   Diagnosis    CAD (coronary artery disease)    HTN (hypertension), benign    Dyslipidemia    Bruit of right carotid artery    DM (diabetes mellitus), type 2, uncontrolled with complications    Tobacco abuse    PAF
Daxa Josue  3/31/2023  [id number]    Chief Complaint: Closed left hip fracture, sequela    Subjective:   Doing well this morning. She is walking short distances and feels like her pain is doing well. No new issues overnight. Seen in therapy this morning sitting in a wheelchair after some ambulation. Particiapting well.     ROS: No CP, SOB, dyspnea    Objective:  Patient Vitals for the past 24 hrs:   BP Temp Temp src Pulse Resp SpO2 Height   03/30/23 2115 127/82 98.1 °F (36.7 °C) Oral 83 16 99 % --   03/30/23 1530 132/65 -- -- 61 -- -- --   03/30/23 1430 -- -- -- -- 16 96 % --   03/30/23 1153 -- -- -- -- -- -- 5' (1.524 m)   03/30/23 1015 (!) 92/54 98.6 °F (37 °C) -- 84 16 96 % --     Gen: No distress, pleasant.   HEENT: Normocephalic, atraumatic.   CV: Regular rate and rhythm. No MRG   Resp: No respiratory distress. CTAB   Abd: Soft, nontender   Ext: + edema.   Neuro: Alert, oriented, appropriately interactive.     Laboratory data: Available via EMR.     Therapy progress:       PT    Supine to Sit: Dependent  Sit to Supine: Partial/moderate assistance   Sit to Stand: Partial/moderate assistance  Chair/Bed to Chair Transfer: Partial/moderate assistance  Car Transfer:    Ambulation 10 ft: Partial/moderate assistance  Ambulation 50 ft:    Ambulation 150 ft:    Stairs - 1 Step:    Stairs - 4 Step:    Stairs - 12 Step:      OT    Eating:    Oral Hygiene:    Bathing: Partial/moderate assistance  Upper Body Dressing: Supervision or touching assistance  Lower Body Dressing: Dependent  Toilet Transfer: Partial/moderate assistance  Toilet Hygiene: Substantial/maximal assistance    Speech Therapy         Body mass index is 32.09 kg/m².    Assessment:  Patient Active Problem List   Diagnosis    CAD (coronary artery disease)    HTN (hypertension), benign    Dyslipidemia    Bruit of right carotid artery    DM (diabetes mellitus), type 2, uncontrolled with complications    Tobacco abuse    PAF (paroxysmal atrial fibrillation) 
Daxa Slaughter Hatchet  4/11/2023  [id number]    Chief Complaint: Closed left hip fracture, sequela    Subjective:  Plan for discharge tomorrow. She is doing well in therapy and doesn't have any new pain today. She is preparing for discharge. Slept well last night. Team conference today. ROS: No CP, SOB, dyspnea    Objective:  Patient Vitals for the past 24 hrs:   BP Temp Temp src Pulse Resp SpO2   04/11/23 0915 99/63 98.2 °F (36.8 °C) Axillary 93 16 93 %   04/10/23 2019 119/81 98 °F (36.7 °C) Oral 72 16 95 %       Gen: No distress, pleasant. HEENT: Normocephalic, atraumatic. CV: Regular rate and rhythm. No MRG   Resp: No respiratory distress. CTAB   Abd: Soft, nontender   Ext: + edema. Neuro: Alert, oriented, appropriately interactive. Laboratory data: Available via EMR. Therapy progress:       PT    Supine to Sit: Supervision or touching assistance  Sit to Supine: Supervision or touching assistance   Sit to Stand: Supervision or touching assistance  Chair/Bed to Chair Transfer: Supervision or touching assistance  Car Transfer: Supervision or touching assistance  Ambulation 10 ft: Supervision or touching assistance  Ambulation 50 ft:    Ambulation 150 ft:    Stairs - 1 Step:    Stairs - 4 Step:    Stairs - 12 Step:      OT    Eating: Independent  Oral Hygiene: Setup or clean-up assistance  Bathing: Partial/moderate assistance  Upper Body Dressing: Setup or clean-up assistance  Lower Body Dressing: Partial/moderate assistance  Toilet Transfer: Supervision or touching assistance  Toilet Hygiene: Supervision or touching assistance    Speech Therapy         Body mass index is 30.84 kg/m².     Assessment:  Patient Active Problem List   Diagnosis    CAD (coronary artery disease)    HTN (hypertension), benign    Dyslipidemia    Bruit of right carotid artery    DM (diabetes mellitus), type 2, uncontrolled with complications    Tobacco abuse    PAF (paroxysmal atrial fibrillation) ([facility])    Class 1 obesity due
Incentive Spirometry education and demonstration completed by Respiratory Therapy Yes      Response to education: Excellent     Teaching Time: 5 minutes    Minimum Predicted Vital Capacity - 455 mL. Patient's Actual Vital Capacity - 1250 mL. Turning over to Nursing for routine follow-up Yes.     Comments:     Electronically signed by Alona Lozano RCP on 3/30/2023 at 3:07 PM
Left PIV leaked with flush. Removed it with angiocath intact. Noted no complication.
Morning assessment completed, vss, The care plan and education has been reviewed and mutually agreed upon with the patient. Pt remains free from falls. Fall precautions in place--bed in lowest position, call light within reach, bed alarm in use. Pt aware to call for assistance before getting up.
Morning assessment completed, vss, denies pain, schedule meds given, The care plan and education has been reviewed and mutually agreed upon with the patient. Pt remains free from falls. Fall precautions in place--bed in lowest position, call light within reach, bed alarm in use. Pt aware to call for assistance before getting up.
Nutrition Note    RECOMMENDATIONS  PO Diet: CCC (hx DM)       NUTRITION ASSESSMENT   Pt is s/p left hip arthroplasty d/t hip fracture. Receives a regular diet with po intake greater than 75% of meals. Pt reports has a great appetite; no wt loss to report in recent past.  Pt has hx DM; glucose 152. Will add CCC modifier to maintain good glucose control. Will encourage good sources of protein for healing of open areas & surgical incision. No further needs at this time. Nutrition Related Findings: trace edema LLE; gluc 152  Wounds: Surgical Incision, Open Wounds  Nutrition Education:  Education not indicated   Nutrition Goals: PO intake 75% or greater     MALNUTRITION ASSESSMENT      Malnutrition Status: No malnutrition    NUTRITION DIAGNOSIS   Increased nutrient needs     CURRENT NUTRITION THERAPIES  ADULT DIET; Regular     PO Intake: %   PO Supplement Intake:None Ordered    ANTHROPOMETRICS  Current Height: 5' (152.4 cm)  Current Weight: 164 lb 4.8 oz (74.5 kg)    Ideal Body Weight (IBW): 100 lbs  (45 kg)        BMI: 32    The patient will be monitored per nutrition standards of care. Consult dietitian if additional nutrition interventions are needed prior to RD reassessment.      Doug Sanchez, LO, LD    Contact: [phone]
Patient was admitted to room 4908 at [id number]. Patient was oriented to the Call Light, Phone, TV, Thermostat, Bed Controls, Bathroom and Emergency Cord. Patient verbalized and demonstrated understanding of all. Patient was also given an overview of Unit Routines for Acute Rehab, including the patient's rights and responsibilities as well as the CMS IRF DENISE Privacy Act Statement providing notice of data collection. Patient states that their normal bowel regime is 2 times a week. Meal times were explained, including how to order food. The white board, (which is posted on the wall by the door is used for communication) has the Therapy Scheduled that is posted each day along with the name of your doctor, nurse, and therapist for your convenience. We recommend any family that will be care givers or any care givers the patient has, take part in therapy. We have no set visiting hours, we suggest non-caregiver friends and family visitors come after therapy (at 4 PM or later) to allow patient to rest in between sessions.       In conjunction with the patient and patients family, this nurse worked to establish a tailored Fall TIPS plan to ensure patient safety and compliance:    Falls TIPS Completion    Patient identified as increased risk for harm if fall:  [x] Yes     Fall Risks  History of Falls:    [x] Yes   Medication Side Effects:   [] Yes   Walking Aid:    [x] Yes   IV Pole or Equipment:   [] Yes   Unsteady Walk:     [x] Yes   May Forget or Choose Not to Call: [] Yes     Fall Interventions   Communicate Recent Fall and/or Risk of Harm: [] Yes   Walking Aids:  Crutches: [] Yes   Cane: [] Yes   Walker: [x] Yes   IV Assistance When Walking: [x] Yes   Toileting Schedule: Every 1-2  Hours  Bedpan:   [x] Yes   Assist to Commode: [x] Yes   Assist to Bathroom: [x] Yes   Bed Alarm On: [x] Yes   Assistance Out of Bed:  Bedrest: [] Yes   1 Person: [x] Yes   2 People: [] Yes
Physical Therapy    [facility] [address]   Phone: [phone]    Physical Therapy    [] Initial Evaluation            [x] Daily Treatment Note         [x] Discharge Summary      Patient: Puma Adames   : 1950   MRN: [id number]   Date of Service:  2023  Admitting Diagnosis: Closed left hip fracture, sequela  Current Admission Summary: This patient is a 82-year-old female medical history significant for CAD DM2 HTN HLD CVA. She presents after a fall at home with subsequent femoral neck fracture. She is currently S/p left total hip arthroplasty for fracture (3/27/23) with Dr. Sam Garcia. Past Medical History:  has a past medical history of Acute CVA (cerebrovascular accident) ([facility] [address] Arthritis, CAD (coronary artery disease), Diabetes mellitus ([facility] [address] Hyperlipidemia, and Hypertension. Past Surgical History:  has a past surgical history that includes Coronary artery bypass graft (); Gastric bypass surgery (); Cardiac surgery (); Dilatation, esophagus; Cholecystectomy; and Total hip arthroplasty (Left, 2023). Discharge Recommendations: HHPT (S3)  DME Required For Discharge: DME to be determined pending patient progress, will require wheelchair if patient remains at 25% weight bearing status  Precautions/Restrictions: high fall risk, weight bearing  Weight Bearing Restrictions: partial weight bearing - 25 % (about 40# of pts wt)   [x] Left Lower Extremity     Required Braces/Orthotics: no braces required     Positional Restrictions:no positional restrictions    **Per discussion with Ortho NP pt is 25% WB on (L) LE. Will get 2 week follow-up x-ray next week, at which time Dr. Sam Garcia will determine if pt can progress to 50% WBing. Pt had soft interoperative bone, thus the limited WBing status.      Pre-Admission Information   Lives With: alone    Type of Home: apartment, On first floor  Home Layout: one level  Home Access:  1 step to enter without rails ; ~4
Physical Therapy    [facility] [address]   Phone: [phone]    Physical Therapy    [] Initial Evaluation            [x] Daily Treatment Note         [] Discharge Summary      Patient: Ellen Eldridge   : 1950   MRN: [id number]   Date of Service:  3/31/2023  Admitting Diagnosis: Closed left hip fracture, sequela  Current Admission Summary: This patient is a 72-year-old female medical history significant for CAD DM2 HTN HLD CVA. She presents after a fall at home with subsequent femoral neck fracture. She is currently S/p left total hip arthroplasty for fracture (3/27/23) with Dr. Nick Abrams. Past Medical History:  has a past medical history of Acute CVA (cerebrovascular accident) ([facility] [address] Arthritis, CAD (coronary artery disease), Diabetes mellitus ([facility] [address] Hyperlipidemia, and Hypertension. Past Surgical History:  has a past surgical history that includes Coronary artery bypass graft (); Gastric bypass surgery (); Cardiac surgery (); Dilatation, esophagus; Cholecystectomy; and Total hip arthroplasty (Left, 2023). Discharge Recommendations: HHPT (S3)  DME Required For Discharge: DME to be determined pending patient progress, likely will require wheelchair if maintaining 25% PWB status  Precautions/Restrictions: high fall risk, weight bearing  Weight Bearing Restrictions: partial weight bearing - 25 %  [] Right Upper Extremity  [] Left Upper Extremity [] Right Lower Extremity  [x] Left Lower Extremity     Required Braces/Orthotics: no braces required   [] Right  [] Left  Positional Restrictions:no positional restrictions    Per MD patient is okay to ambulate short distances despite being unable to maintain WB status in order to continue to encourage lower body strengthening and prevent deconditioning.      Pre-Admission Information   Lives With: alone    Type of Home: apartment, On first floor  Home Layout: one level  Home Access:  1 step to enter without rails
Secure message to provider asking if [facility] can be removed.      Jael RETANAN, RN   [phone]
Today is 14th day from the surgery. Removed surgical dressing. The incision is dry and intact except proximal incision at the abdomen fold. Noted no drainage. Applied Band-Aid. Right side abd fold dry and intact. Left abdomen fold has one irritated area. Applied nystatin ointment.
30.9 in adult    Acute CVA (cerebrovascular accident) [facility])    Encounter for electronic analysis of reveal event recorder    Fracture of base of femoral neck (cervicotrochanteric) open, left, sequela    Closed fracture of neck of left femur ([address]    Closed left hip fracture, sequela       The patient has progressed functionally as demonstrated by their improved ambulation. Plan: This patient continues to require an ARU level of care from all disciplines to address the following issues:    Left hip fracture- s/p arthroplasty  - PT/OT  - per ortho  - WB status:  25% WB through operative extremity; No specific hip precuations  - DVT prophylaxis: Eliquis   - Ice therapy  - PT/OT  - Pain Control: Oxy rx in chart for d/c. Due to orthopaedic surgical procedure/condition, patient may require pain medication for up to 6-8 weeks.     ADRIAN LaneP.H  PM&R  4/1/2023  10:36 AM
due to excess calories with serious comorbidity and body mass index (BMI) of 30.0 to 30.9 in adult    Acute CVA (cerebrovascular accident) ([facility] [address]    Encounter for electronic analysis of reveal event recorder    Fracture of base of femoral neck (cervicotrochanteric) open, left, sequela    Closed fracture of neck of left femur ([facility] [address]    Closed left hip fracture, sequela       The patient has progressed functionally as demonstrated by their improved ambulation. Plan: This patient continues to require an ARU level of care from all disciplines to address the following issues:    Left hip fracture- s/p arthroplasty  - PT/OT  - per ortho  - WB status:  25% WB through operative extremity; No specific hip precuations  - DVT prophylaxis: Eliquis   - Ice therapy  - PT/OT  - Pain Control: Oxy rx in chart for d/c. Due to orthopaedic surgical procedure/condition, patient may require pain medication for up to 6-8 weeks.     Renae Gimenez MD  PM&R  4/2/2023  9:44 PM
rails ; ~4 inch step  Bathroom Layout: walk in shower  Bathroom Equipment:  no equipment  Toilet Height: standard height  Home Equipment: no prior equipment  Transfer Assistance: Independent without use of device  Ambulation Assistance:Independent without use of device  ADL Assistance: independent with all ADL's  IADL Assistance: independent with homemaking tasks  Active :        [] Yes  [x] No; Patient's brothers and friends drive patient  Hand Dominance: [] Left  [x] Right  Current Employment: disabled, Patient has not worked since [facility] following open heart surgery  Hobbies: [address]: Patient reports admittance was due to a fall     Examination   Vision:   Vision Gross Assessment: Impaired and Vision Corrective Device: wears glasses at all times  [facility]   [facility]      Subjective  General: Patient lying semi-reclined in bed upon arrival. Patient is agreeable to PT/OT treatment session. Reports that she feels stiff first thing in the morning. Pain: 0/10  Pain Interventions: patient denies pain interventions       Functional Mobility  Bed Mobility  Supine to Sit: contact guard assistance  Scooting: stand by assistance  Comments: Patient completed bed mobility with bed flat; requested to get out of bed towards her R, which mimics her home set up  Transfers  Sit to stand transfer: minimal assistance  Stand to sit transfer: minimal assistance  Toilet transfer: minimal assistance, with use of grab bar  Comments: Patient completed STS from EOB, w/c, and toilet with RW  Ambulation  Surface:level surface  Assistive Device: rolling walker  Assistance: minimal assistance  Distance: 8' + 10' + 10'  Gait Mechanics: Forward flexion, decreased step height/length, decreased obdulia   Comments: Patient ambulated within room utilizing RW with decreased speed. Difficulty noted maintaining 25% WB of LLE during task. Stair Mobility  Stair mobility not completed on this date.   Comments:  Wheelchair Mobility:  No w/c
without use of device  Ambulation Assistance:Independent without use of device  ADL Assistance: independent with all ADL's  IADL Assistance: independent with homemaking tasks  Active :        [] Yes                 [x] No; Patient's brothers and friends drive patient  Hand Dominance: [] Left                 [x] Right  Current Employment: disabled, Patient has not worked since 1993 following open heart surgery  Hobbies: Puzzles  Recent Falls: Patient reports admittance was due to a fall     Subjective  General: Patient in recliner at entry, agreeable to OT session. Re-education for 25% WB prior to mobility. Pain: 0/10, pt stated she felt \"sore\"  Pain Interventions: RN notified of patient request for pain medication- requesting medication at [facility]     Activities of Daily Living  Basic Activities of Daily Living  Grooming: setup assistance  Grooming Comments: seated on wheelchair for oral care and face washing  General Comments: pt declined all other ADL needs  Instrumental Activities of Daily Living  No IADL completed on this date. Functional Mobility  Bed Mobility  Bed mobility not completed on this date. Comments:   Transfers  Sit to stand transfer:contact guard assistance, minimal assistance  Stand to sit transfer: contact guard assistance  Stand step transfer: minimal assistance  Comments: good adherence to WB precautions during sit<>sit stands, questionable adherence during stand-step transfers, assist required for RW management at times  Functional Mobility:  Sitting Balance: supervision. Standing Balance: contact guard assistance, minimal assistance.     Functional Mobility: .  minimal assistance, VC and assist for RW proximity/management with turns  Functional Mobility Activity: ~10ft in room  Functional Mobility Device Use: rolling walker  Functional Mobility Comment: questionable adherence to WB precautions during functional mobility w/c >> recliner    Therapeutic Activity  Dynamic standing activity
from PCA to  OT. Pt agreeable to OT tx and shower. Pt informed OT of small amounts of blood in brief the past couple days and \"didn't feel like telling anyone.\" RN notified. MD visited during session. Pt c/o of fatigue.  Pain: 3/10.  Location: back    Pain Interventions: not applicable    Activities of Daily Living  Basic Activities of Daily Living  Grooming: setup assistance  Grooming Comments: grooming and oral care completed at sink seated in w/c   Upper Extremity Bathing: setup assistance  Lower Extremity Bathing: minimal assistance   Bathing Equipment: long handled sponge  Bathing Comments: assist with buttocks care leaning forward with grab bars. Pt stated she \"didn't want to stand\". Pt dried feet and LE with towel wrapped around long handled sponge.    Upper Extremity Dressing: setup assistance  Lower Extremity Dressing: minimal assistance  Dressing Equipment: reacher, sock aide  Dressing Comments: Completed all dressing seated on shower chair standing as needed with use of grab bars and CGA. Doff socks with reacher, don socks with sock aid. Pt needed assistance pulling pants and briefs over hips with CGA due to inability to maintain WB precautions and c/o of fatigue. Bed sore/skin tear on L lower buttocks noted. Mexiplex pad removed and replaced after shower.   Toileting: contact guard assistance.    Toileting Comments: completed buttocks hygiene seated on toilet. CGA for clothing management. Voided urine twice.   General Comments: Questionable adherence to maintain 25% WB precautions, max cues and activity modified throughout to ensure adherence. Pt c/o of back pain and fatigue throughout all ADLs.      Instrumental Activities of Daily Living  No IADL completed on this date.  Functional Mobility  Bed Mobility  Bed mobility not completed on this date.  Comments: Pt on toilet upon arrival, in recliner at [facility].   Transfers  Sit to stand transfer:contact guard assistance  Stand to sit transfer: contact guard 
managing over hips in stance and Gaby for balance. Toileting: maximum assistance. Toileting Comments: patient incontinent of urine. Patient required maxA for clothing mgmt   General Comments: patient participated well in shower but fatigued from shower. Instrumental Activities of Daily Living  No IADL completed on this date. Functional Mobility  Bed Mobility  Supine to Sit: 2 person assistance with mod + min   Rolling Left: maximum assistance  Rolling Right: maximum assistance  Comments: assist for BLE and trunk   Transfers  Sit to stand transfer:moderate assistance  Stand to sit transfer: moderate assistance  Stand step transfer: moderate assistance  Toilet transfer: moderate assistance  Shower transfer: moderate assistance  Comments: transfer from EOB and toilet to RW. Stand step from wc to toilet with grab bar. patient use grab bars to stand step from shower to wc due to fatigue/dizziness. patient appeared to maintain precaution- will benefit from using scale to ensure proper weight bearing   Functional Mobility:  Sitting Balance: contact guard assistance. Standing Balance: moderate assistance. Standing Balance Comment: with RW   Functional Mobility: .  moderate assistance, initially max progressing to mod  Functional Mobility Activity: 10 + 5 ft   Functional Mobility Device Use: rolling walker  Functional Mobility Comment: mobility completed from EOB into bathroom with wc follow and from toilet to shower chair. Other Therapeutic Interventions    Second Session:    Patient seated in recliner upon arrival, agreeable to second therapy co-treatment session. Patient reported need to use restroom, patient with Gaby of 2 for initial transfer from recliner with use of RW. Patient with Gaby of 2 for ambulation ~15ft to/from bathroom, while maintaining 25% WB status and for walker management.   Patient with Gaby for [facility] commode transfer with use of L grab bar, patient with Gaby for standing balance
core/trunk control via upright posture. Pt maintained >10# through LLE while completing this task. Pt completed UE strengthening exercises w/ red flex bar to target UE strength for w/c mobility and functional transfers and mobility. Pt completed 12 reps x2: supination/pronation, wrist flexion/extension, ulnar/radial deviation. Exercises completed seated in w/c w/ upright position and feet flat on floor. Pt required brief rest break between each rep. Second Session   Pt in recliner at entry, agreeable to session, denied pain at rest and declined all ADL needs. Pt expressing frustration with d/c plan and feeling overwhelmed about where she is going after ARU stay- emotional support provided. Pt completed SPTs at [facility] w/c<>recliner; sit<>stands to RW form w/c at [facility]    Pt propelled w/c with B UE ~200ft total during session for UE strengthening and endurance- intermittent Noris to steer when fatigued, pt able to avoid all obstacles in hallway. Pt completed multiple standing activities with focus on dynamic reaching, balance, WB status, and standing tolerance for increased ease of BADL tasks: pt cleaned medium off window with RW for B UE support as needed- ~2min to complete at [facility]; leisure activity (\"melting snow-man\") for increased cog challenge- pt stood for 5min 10 sec while engaged in task, min VC for problem solving CGA for balance, good WB adherence in static stance position with reaching in [place]. Pt left in recliner at [facility], chair alarm on, call light and needs in reach, family/friends in room. ______________________________________________________________________________________________  Rupert Addison  Comments: will continue to assess, very anxious regarding d/c date.    Orientation:    alert and oriented x 4  Command Following:   [facility]  Education  Barriers To Learning: none  Patient Education: patient educated on goals, OT role and benefits, plan of care, precautions, ADL adaptive strategies,
Treatment Recommendations: strengthening, ROM, balance training, functional mobility training, transfer training, gait training, stair training, endurance training, patient/caregiver education, and equipment evaluation/education    Goals  Patient Goals: Return to PLOF   Short Term Goals:  Time Frame: 10-14 days  Patient will complete bed mobility at [facility]   Patient will complete transfers at [facility]   Patient will ambulate 50 ft with use of rolling walker at modified independent  Patient will ascend/descend 1 stairs without use of HR at [facility]    Therapy Session Time      Individual Group Co-treatment   Time In      0830   Time Out      0935   Minutes      72     Second Session Therapy Time:   Individual Concurrent Group Co-treatment   Time In        1400   Time Out        1436   Minutes        36     Timed Code Treatment Minutes:   65 +36  Total Treatment Minutes:  101 minutes       Electronically Signed By:   First Session: Tasha Bassett PT, DPT, [id number]  2nd session completed by: Power Uribe PT, DPT [id number]
maintain standing at modified independent for 5 minutes. Patient to gather and transport IADL items at modified independent     -Goals not yet met this date      Therapy Session Time     Individual Group Co-treatment   Time In    830   Time Out    920   Minutes    50      Second Session Therapy Time:   Individual Concurrent Group Co-treatment   Time In  1515        Time Out  1600        Minutes  45            Timed Code Treatment Minutes: 71+57    Total Treatment Minutes:  95       First session: Tasha Godoy OTR/L, [id number]       Second Session: Simón Jones OTR/L [id number], 3/31/2023, 5:51 PM
modified independent  New goal 4/3: Patient will propel w/c 50 ft over level tile floor with use of (B) UE at Indep    Therapy Session Time      Individual Group Co-treatment   Time In  830       Time Out  930       Minutes  60         Second Session Therapy Time:   Individual Concurrent Group Co-treatment   Time In 1245         Time Out 1315         Minutes 30           Timed Code Treatment Minutes:  90    Total Treatment Minutes:  90           Electronically Signed By: Bao Lang, DPT [id number]    2nd session: Vania Julien PT, DPT [id number]
and are accurate for the admission assessment period.     Assessing/Reviewing RN:  Addy Holding No    Assessing/Reviewing RN:

## 2023-04-12 NOTE — CARE COORDINATION
Discharge Plan:      Patient discharging to:     [facility] Sebastian Meléndez, [address]  [phone]    SW/JIMENA  faxed, [address] and AVS to [phone]    Narcotic Prescription faxed was: OxyCODONE 5 MG    RN: Quincy Cole called report to [phone]     Medical Transport with [facility], 12:45/1:00 PM  time    Patient advised of discharge and in agreement    FABIEN/JIMENA contacted Thor Gonzalezer ([phone]), Admission Coordinator @ [place] and left a VM message informing of patient discharge time. JULIO completed. ID #  [id number]    All discharge needs met.     Jeb Craig, MSW, LSW  [phone]

## 2023-04-17 NOTE — PROGRESS NOTES
We received a remote transmission from patient's monitor at home. Remote Linq report shows AF with RVR. Pt is on Eliquis. EP physician to review. We will continue to monitor remotely. Implanted for AF management. Pt is on Eliquis. End of 31-day monitoring period 4-18-23.

## 2023-04-18 ENCOUNTER — NURSE ONLY (OUTPATIENT)
Dept: CARDIOLOGY CLINIC | Age: 73
End: 2023-04-18

## 2023-04-18 DIAGNOSIS — I63.9 ACUTE CVA (CEREBROVASCULAR ACCIDENT) (HCC): ICD-10-CM

## 2023-04-18 DIAGNOSIS — Z45.09 ENCOUNTER FOR ELECTRONIC ANALYSIS OF REVEAL EVENT RECORDER: ICD-10-CM

## 2023-04-18 DIAGNOSIS — I48.0 PAF (PAROXYSMAL ATRIAL FIBRILLATION) (HCC): ICD-10-CM

## 2023-04-21 ENCOUNTER — CARE COORDINATION (OUTPATIENT)
Dept: CASE MANAGEMENT | Age: 73
End: 2023-04-21

## 2023-04-21 NOTE — CARE COORDINATION
Called [facility] for follow up with  Brooke [facility]man program. Spoke with Sherwin Saul, MSW states that patient continues to progress and participating in therapy. States that patient plans to discharge home with home care. Discharge in 1-2 weeks.   Tawanna Essex BSN  Care Transition Nurse for ortho bundle  [phone]

## 2023-04-27 ENCOUNTER — CARE COORDINATION (OUTPATIENT)
Dept: CASE MANAGEMENT | Age: 73
End: 2023-04-27

## 2023-04-27 NOTE — CARE COORDINATION
Called [facility] for follow up with  [facility] program. Spoke with ERIBERTO Downing and states that patient is discharging tomorrow on 4/28/23 and states that she will received SN,PT, and OT through [facility].     Follow up appointments:    Future Appointments   Date Time Provider Mehran Orona   5/1/2023  2:45 PM Lashawn Rossi MD [facility]   5/9/2023 11:30 AM SCHEDULE, [place] DEVICE CHECK [facility]   5/9/2023 11:30 AM Clary Shook MD [facility]   5/23/2023  8:30 PM SCHEDULE, [place] REMOTE TRANSMISSION [facility]   6/27/2023  7:00 PM SCHEDULE, [place] REMOTE TRANSMISSION [facility]   8/1/2023  7:00 PM SCHEDULE, [place] REMOTE TRANSMISSION [facility]   9/5/2023  7:00 PM SCHEDULE, [place] REMOTE TRANSMISSION [facility]   10/10/2023  7:00 PM SCHEDULE, [place] REMOTE TRANSMISSION [facility]   11/14/2023  7:00 PM SCHEDULE, [place] REMOTE TRANSMISSION [facility]   12/19/2023  7:00 PM SCHEDULE, [place] REMOTE TRANSMISSION [facility]     Patsy Rivas BSN  Care Transition Nurse for ortho bundle  [phone]

## 2023-05-01 ENCOUNTER — OFFICE VISIT (OUTPATIENT)
Dept: ORTHOPEDIC SURGERY | Age: 73
End: 2023-05-01

## 2023-05-01 VITALS — HEIGHT: 60 IN | BODY MASS INDEX: 31.22 KG/M2 | WEIGHT: 159 LBS

## 2023-05-01 DIAGNOSIS — Z96.642 HISTORY OF TOTAL LEFT HIP REPLACEMENT: Primary | ICD-10-CM

## 2023-05-01 PROCEDURE — 99024 POSTOP FOLLOW-UP VISIT: CPT | Performed by: ORTHOPAEDIC SURGERY

## 2023-05-01 NOTE — PROGRESS NOTES
Patient: Vidal Nance                  : 1950   MRN: [id number]   Date of Visit: 23     Physician: Alex Smith MD.     Reason for Visit: Status post FAROOQ     Subjective History of Present Illness:     Vidal Nance is here for regularly scheduled follow-up s/p LEFT FAROOQ. Reports they are doing well, occasional aches and pains are controlled with over the counter medications. They do not report fevers, chills or drainage from the incision site    Physical Examination??: ?   General: Patient is alert and oriented x 3 and appears comfortable. Incision is well-approximated, no erythema, fluctuance   Fires quad, SILT to thigh     Radiographs: AP pelvis/AP/lateral hip views of operative hip with well-positioned total hip arthroplasty. No evidence of fracture subluxation or dislocation. Assessment and Plan?: The patient is progressing well approximately 4 weeks s/p FAROOQ for fracture    1. A thorough discussion was had with the patient concerning the ?postoperative course and the patient is in agreement with the plan. 2. They will continue to wean from pain medications and progress weight bearing. I had slowed her down due to concerns about her bone quality  4. Will see the patient in 6 weeks with repeat xrays at that time.     _______________________      Alex Smith MD

## 2023-05-04 ENCOUNTER — CARE COORDINATION (OUTPATIENT)
Dept: CASE MANAGEMENT | Age: 73
End: 2023-05-04

## 2023-05-04 NOTE — CARE COORDINATION
Spoke with patient. Incision status: States free from redness or drainage. Edema/Swelling: States swelling in feet and ankles, discussed elevating. Pain level and status: States not having any pain. Bowels: No complaints. Home Care Agency active: States [facility] started.     Do you have all of your medications: Yes    Changes in medications: No    Follow up appointments:    Future Appointments   Date Time Provider Mehran Orona   5/9/2023 11:30 AM SCHEDULE, [place] DEVICE CHECK [facility]   5/9/2023 11:30 AM Gabby Centeno MD [facility]   5/15/2023  1:15 PM Praveena Delcid MD [facility]   5/23/2023  8:30 PM SCHEDULE, [place] REMOTE TRANSMISSION [facility]   6/27/2023  7:00 PM SCHEDULE, [place] REMOTE TRANSMISSION [facility]   8/1/2023  7:00 PM SCHEDULE, [place] REMOTE TRANSMISSION [facility]   9/5/2023  7:00 PM SCHEDULE, [place] REMOTE TRANSMISSION [facility]   10/10/2023  7:00 PM SCHEDULE, [place] REMOTE TRANSMISSION [facility]   11/14/2023  7:00 PM SCHEDULE, [place] REMOTE TRANSMISSION [facility]   12/19/2023  7:00 PM SCHEDULE, [place] REMOTE TRANSMISSION [facility]     Peyman Myles BSN  Care Transition Nurse for ortho bundle  [phone]

## 2023-05-10 ENCOUNTER — CARE COORDINATION (OUTPATIENT)
Dept: CASE MANAGEMENT | Age: 73
End: 2023-05-10

## 2023-05-10 NOTE — CARE COORDINATION
Spoke with patient. Incision status: States free from redness or drainage. Edema/Swelling: States no swelling present. Pain level and status: States not having any pain. Bowels: No complaints. Home Care Agency active: Continues with therapy.     Do you have all of your medications: Yes    Changes in medications: No    Follow up appointments:    Future Appointments   Date Time Provider Mehran Orona   5/15/2023  1:15 PM Mindy Brand MD [facility]   5/23/2023  8:30 PM SCHEDULE, [place] REMOTE TRANSMISSION [facility]   5/24/2023  2:30 PM General Muta, APRN - CNP [facility]   5/24/2023  3:00 PM SCHEDULE, [place] DEVICE CHECK [facility]   6/27/2023  7:00 PM SCHEDULE, [place] REMOTE TRANSMISSION [facility]   8/1/2023  7:00 PM SCHEDULE, [place] REMOTE TRANSMISSION [facility]   9/5/2023  7:00 PM SCHEDULE, [place] REMOTE TRANSMISSION [facility]   10/10/2023  7:00 PM SCHEDULE, [place] REMOTE TRANSMISSION [facility]   11/14/2023  7:00 PM SCHEDULE, [place] REMOTE TRANSMISSION [facility]   12/19/2023  7:00 PM SCHEDULE, [place] REMOTE TRANSMISSION [facility]     Bc Rivers BSN  Care Transition Nurse for ortho bundle  [phone]

## 2023-05-15 ENCOUNTER — OFFICE VISIT (OUTPATIENT)
Dept: ORTHOPEDIC SURGERY | Age: 73
End: 2023-05-15

## 2023-05-15 VITALS — WEIGHT: 159 LBS | HEIGHT: 60 IN | BODY MASS INDEX: 31.22 KG/M2

## 2023-05-15 DIAGNOSIS — Z96.642 HISTORY OF TOTAL LEFT HIP REPLACEMENT: Primary | ICD-10-CM

## 2023-05-15 PROCEDURE — 99024 POSTOP FOLLOW-UP VISIT: CPT | Performed by: ORTHOPAEDIC SURGERY

## 2023-05-15 NOTE — PROGRESS NOTES
Patient: Svitlana Howe                  : 1950   MRN: [id number]   Date of Visit: 5/15/23     Physician: Gabby Nice MD.     Reason for Visit: Status post FAROOQ     Subjective History of Present Illness:     Svitlana Howe is here for regularly scheduled follow-up s/p LEFT FAROOQ. Reports they are doing well, occasional aches and pains are controlled with over the counter medications. They do not report fevers, chills or drainage from the incision site    She has been putting weight on the leg without problems. Physical Examination??: ?   General: Patient is alert and oriented x 3 and appears comfortable. Incision is well-approximated, no erythema, fluctuance   Fires quad, SILT to thigh     Radiographs: AP pelvis/AP/lateral hip views of operative hip with well-positioned total hip arthroplasty. No evidence of fracture subluxation or dislocation. There is no interval change. Assessment and Plan?: The patient is progressing well approximately 6 weeks s/p FAROOQ for fracture    1. A thorough discussion was had with the patient concerning the ?postoperative course and the patient is in agreement with the plan. 2.She can be full weight bearing with no restrictions. 4. Will see the patient in 6 months with repeat xrays at that time.   _______________________      Gabby Nice MD

## 2023-05-16 ENCOUNTER — CARE COORDINATION (OUTPATIENT)
Dept: CASE MANAGEMENT | Age: 73
End: 2023-05-16

## 2023-05-16 NOTE — CARE COORDINATION
Spoke with patient. Incision status: States free from redness or drainage. Edema/Swelling: States swelling has improved. Pain level and status: States pain is tolerable. Bowels: No complaints.     Home Care Agency active: Continues with nursing and therapy    Do you have all of your medications: Yes    Changes in medications: No    Follow up appointments:    Future Appointments   Date Time Provider Mehran Orona   5/23/2023  8:30 PM SCHEDULE, [place] REMOTE TRANSMISSION [facility]   5/24/2023  2:30 PM ИВАН Neumann CNP [facility]   5/24/2023  3:00 PM SCHEDULE, [place] DEVICE CHECK [facility] Card[facility]   6/27/2023  7:00 PM SCHEDULE, [place] REMOTE TRANSMISSION [facility]   8/1/2023  7:00 PM SCHEDULE, [place] REMOTE TRANSMISSION [facility]   9/5/2023  7:00 PM SCHEDULE, [place] REMOTE TRANSMISSION [facility]   10/10/2023  7:00 PM SCHEDULE, [place] REMOTE TRANSMISSION [facility]   11/13/2023  1:15 PM Vani Pérez MD [facility]   11/14/2023  7:00 PM SCHEDULE, [place] REMOTE TRANSMISSION [facility]   12/19/2023  7:00 PM SCHEDULE, [place] REMOTE TRANSMISSION [facility]     Sandeep Chign BSN  Care Transition Nurse for ortho bundle  [phone]

## 2023-05-22 NOTE — PROGRESS NOTES
We received a remote transmission from patient's monitor at home. Remote Linq report shows AF with RVR and 5 second pause recording. Pt is on Eliquis. EP physician to review. We will continue to monitor remotely. Implanted for AF management. Pt is on Eliquis. End of 31-day monitoring period 5-23-23.

## 2023-05-23 ENCOUNTER — NURSE ONLY (OUTPATIENT)
Dept: CARDIOLOGY CLINIC | Age: 73
End: 2023-05-23

## 2023-05-23 DIAGNOSIS — I48.0 PAF (PAROXYSMAL ATRIAL FIBRILLATION) (HCC): ICD-10-CM

## 2023-05-23 DIAGNOSIS — I63.9 ACUTE CVA (CEREBROVASCULAR ACCIDENT) (HCC): ICD-10-CM

## 2023-05-23 DIAGNOSIS — Z45.09 ENCOUNTER FOR ELECTRONIC ANALYSIS OF REVEAL EVENT RECORDER: ICD-10-CM

## 2023-05-24 ENCOUNTER — OFFICE VISIT (OUTPATIENT)
Dept: CARDIOLOGY CLINIC | Age: 73
End: 2023-05-24
Payer: MEDICARE

## 2023-05-24 ENCOUNTER — NURSE ONLY (OUTPATIENT)
Dept: CARDIOLOGY CLINIC | Age: 73
End: 2023-05-24

## 2023-05-24 VITALS
OXYGEN SATURATION: 95 % | HEART RATE: 67 BPM | BODY MASS INDEX: 31.57 KG/M2 | WEIGHT: 160.8 LBS | DIASTOLIC BLOOD PRESSURE: 60 MMHG | HEIGHT: 60 IN | SYSTOLIC BLOOD PRESSURE: 150 MMHG

## 2023-05-24 DIAGNOSIS — I10 HTN (HYPERTENSION), BENIGN: ICD-10-CM

## 2023-05-24 DIAGNOSIS — I25.119 CORONARY ARTERY DISEASE INVOLVING NATIVE CORONARY ARTERY OF NATIVE HEART WITH ANGINA PECTORIS (HCC): ICD-10-CM

## 2023-05-24 DIAGNOSIS — I48.0 PAF (PAROXYSMAL ATRIAL FIBRILLATION) (HCC): Primary | ICD-10-CM

## 2023-05-24 DIAGNOSIS — E66.09 CLASS 1 OBESITY DUE TO EXCESS CALORIES WITH SERIOUS COMORBIDITY AND BODY MASS INDEX (BMI) OF 30.0 TO 30.9 IN ADULT: ICD-10-CM

## 2023-05-24 PROCEDURE — G8417 CALC BMI ABV UP PARAM F/U: HCPCS | Performed by: NURSE PRACTITIONER

## 2023-05-24 PROCEDURE — 3077F SYST BP >= 140 MM HG: CPT | Performed by: NURSE PRACTITIONER

## 2023-05-24 PROCEDURE — 99214 OFFICE O/P EST MOD 30 MIN: CPT | Performed by: NURSE PRACTITIONER

## 2023-05-24 PROCEDURE — 3017F COLORECTAL CA SCREEN DOC REV: CPT | Performed by: NURSE PRACTITIONER

## 2023-05-24 PROCEDURE — 1123F ACP DISCUSS/DSCN MKR DOCD: CPT | Performed by: NURSE PRACTITIONER

## 2023-05-24 PROCEDURE — G8400 PT W/DXA NO RESULTS DOC: HCPCS | Performed by: NURSE PRACTITIONER

## 2023-05-24 PROCEDURE — G8427 DOCREV CUR MEDS BY ELIG CLIN: HCPCS | Performed by: NURSE PRACTITIONER

## 2023-05-24 PROCEDURE — 4004F PT TOBACCO SCREEN RCVD TLK: CPT | Performed by: NURSE PRACTITIONER

## 2023-05-24 PROCEDURE — 93000 ELECTROCARDIOGRAM COMPLETE: CPT | Performed by: NURSE PRACTITIONER

## 2023-05-24 PROCEDURE — 1090F PRES/ABSN URINE INCON ASSESS: CPT | Performed by: NURSE PRACTITIONER

## 2023-05-24 PROCEDURE — 3078F DIAST BP <80 MM HG: CPT | Performed by: NURSE PRACTITIONER

## 2023-05-24 RX ORDER — AMLODIPINE BESYLATE 5 MG/1
TABLET ORAL
COMMUNITY
Start: 2023-05-10

## 2023-05-24 RX ORDER — DILTIAZEM HYDROCHLORIDE 60 MG/1
60 CAPSULE, EXTENDED RELEASE ORAL 2 TIMES DAILY
Qty: 60 CAPSULE | Refills: 5 | Status: SHIPPED | OUTPATIENT
Start: 2023-05-24

## 2023-05-24 RX ORDER — MECLIZINE HYDROCHLORIDE 25 MG/1
TABLET ORAL
COMMUNITY
Start: 2023-05-12

## 2023-05-24 RX ORDER — ATORVASTATIN CALCIUM 10 MG/1
10 TABLET, FILM COATED ORAL DAILY
Qty: 90 TABLET | Refills: 1 | Status: SHIPPED | OUTPATIENT
Start: 2023-05-24 | End: 2023-05-26

## 2023-05-24 RX ORDER — METOPROLOL TARTRATE 100 MG/1
100 TABLET ORAL 2 TIMES DAILY
Qty: 60 TABLET | Refills: 3 | Status: SHIPPED
Start: 2023-05-24

## 2023-05-24 RX ORDER — ASPIRIN 81 MG/1
81 TABLET ORAL DAILY
COMMUNITY

## 2023-05-24 RX ORDER — LOVASTATIN 20 MG/1
TABLET ORAL
COMMUNITY
Start: 2023-05-02 | End: 2023-05-24

## 2023-05-24 RX ORDER — LISINOPRIL 20 MG/1
20 TABLET ORAL 2 TIMES DAILY
COMMUNITY
Start: 2023-05-02

## 2023-05-24 NOTE — PATIENT INSTRUCTIONS
1. Reduce metoprolol to 100 mg twice per day  2. Start cardizem 60 mg twice per day  3.  Stop lovastatin and start atorvastatin 10 mg daily

## 2023-05-24 NOTE — PROGRESS NOTES
Patient comes in for their OV with NPSR. Patient has [employer] LNQ22 LINQ II Implant-2/17/2022. [employer] Interrogation shows Battery Status GOOD. Since 5/10/2023 AT/AF with a 15.9% burden. Last episode noted on 5/21/2023. Implanted for AF management. Patient remains Eliquis and metoprolol. Please see interrogation for more detail. We will continue to follow the Patient remotely.

## 2023-05-25 ENCOUNTER — TELEPHONE (OUTPATIENT)
Dept: CARDIOLOGY CLINIC | Age: 73
End: 2023-05-25

## 2023-05-25 NOTE — TELEPHONE ENCOUNTER
Pharmacy calling about possible drug interaction between Diltiazem and Atorvastatin.  Please call to advise

## 2023-05-26 RX ORDER — PRAVASTATIN SODIUM 20 MG
20 TABLET ORAL DAILY
Qty: 90 TABLET | Refills: 1 | Status: SHIPPED | OUTPATIENT
Start: 2023-05-26

## 2023-05-30 ENCOUNTER — CARE COORDINATION (OUTPATIENT)
Dept: CASE MANAGEMENT | Age: 73
End: 2023-05-30

## 2023-05-30 ENCOUNTER — TELEPHONE (OUTPATIENT)
Dept: CARDIOLOGY CLINIC | Age: 73
End: 2023-05-30

## 2023-05-30 NOTE — CARE COORDINATION
Spoke with Daxa     Incision status: Reports it's healing w/ no s/s of infection.      Edema/Swelling: Denies swelling    Pain level and status: Denies pain    Use of pain medications: none    Bowels: NA    Home Care Agency active: Continues with nursing and therapy    Outpatient therapy: NA    Do you have all of your medications: Yes    Changes in medications: No    Follow up appointments:    Future Appointments   Date Time Provider Mehran Orona   6/27/2023  7:00 PM SCHEDULE, [place] REMOTE TRANSMISSION [facility]   8/1/2023  7:00 PM SCHEDULE, [place] REMOTE TRANSMISSION [facility]   9/5/2023  1:00 PM SCHEDULE, [place] DEVICE CHECK [facility]   9/5/2023  1:00 PM Grayson Dejesus MD [facility]   9/5/2023  7:00 PM SCHEDULE, [place] REMOTE TRANSMISSION [facility]   10/10/2023  7:00 PM SCHEDULE, [place] REMOTE TRANSMISSION [facility]   11/13/2023  1:15 PM Alyse Haque MD [facility]   11/14/2023  7:00 PM SCHEDULE, [place] REMOTE TRANSMISSION [facility]   12/19/2023  7:00 PM SCHEDULE, [place] REMOTE TRANSMISSION [facility]

## 2023-05-30 NOTE — TELEPHONE ENCOUNTER
----- Message from Meghan Alegria MD sent at 5/27/2023  7:25 PM EDT -----  Patient has significant increase in her Afib burden with Afib - RVR. Schedule her to see me earlier in office.

## 2023-05-30 NOTE — TELEPHONE ENCOUNTER
Please call and schedule with RMM tomorrow at 629 - I held the spot.   After that next available is 08/23/2023 at 1015

## 2023-05-30 NOTE — TELEPHONE ENCOUNTER
I called and spoke with Barrett Sheth  - she cannot get her tomorrow due to transportation. She does feel her rapid heart rate and has some SOB with it. She saw NPSR 05/24/2023 and discussed treatment options with him.      Will discuss with OLESYA

## 2023-05-30 NOTE — TELEPHONE ENCOUNTER
Discussed with rMM - review and offer treatment options again. If she declines - advise to call when she wishes to proceed. Otherwise follow up in the fall. Spoke with patient .  Discussed above - she verbalized understanding

## 2023-06-06 ENCOUNTER — CARE COORDINATION (OUTPATIENT)
Dept: CASE MANAGEMENT | Age: 73
End: 2023-06-06

## 2023-06-06 NOTE — CARE COORDINATION
Called patient for follow up with  [facility]. Left message for return call.   Raffy Patriico BSN  Care Transition Nurse for ortho bundle  [phone]

## 2023-06-21 ENCOUNTER — CARE COORDINATION (OUTPATIENT)
Dept: CASE MANAGEMENT | Age: 73
End: 2023-06-21

## 2023-06-21 NOTE — CARE COORDINATION
Spoke with patient. Incision status: States free from redness or drainage. Edema/Swelling: States no swelling. Pain level and status: States not having any pain. Bowels: No complaints. HEP. Do you have all of your medications: Yes    Changes in medications: No    Instructed patient that bundle program and follow up phone calls are ending. Instructed patient to follow up with Dr. Shira Caldera for any complications/concerns.     Follow up appointments:    Future Appointments   Date Time Provider Mehran Orona   6/27/2023  7:00 PM SCHEDULE, [place] REMOTE TRANSMISSION FF Card[facility]   8/1/2023  7:00 PM SCHEDULE, [place] REMOTE TRANSMISSION [facility]   9/5/2023  1:00 PM SCHEDULE, [place] DEVICE CHECK FF Card[facility]   9/5/2023  1:00 PM German Farrell MD [facility]   9/5/2023  7:00 PM SCHEDULE, [place] REMOTE TRANSMISSION FF Card[facility]   10/10/2023  7:00 PM SCHEDULE, [place] REMOTE TRANSMISSION [facility] Card[facility]   11/13/2023  1:15 PM Meera Christopher MD [facility]   11/14/2023  7:00 PM SCHEDULE, [place] REMOTE TRANSMISSION [facility] Card[facility]   12/19/2023  7:00 PM SCHEDULE, [place] REMOTE TRANSMISSION [facility]     Tj

## 2023-06-27 ENCOUNTER — NURSE ONLY (OUTPATIENT)
Dept: CARDIOLOGY CLINIC | Age: 73
End: 2023-06-27
Payer: MEDICARE

## 2023-06-27 DIAGNOSIS — I63.9 ACUTE CVA (CEREBROVASCULAR ACCIDENT) (HCC): ICD-10-CM

## 2023-06-27 DIAGNOSIS — I48.0 PAF (PAROXYSMAL ATRIAL FIBRILLATION) (HCC): ICD-10-CM

## 2023-06-27 DIAGNOSIS — Z45.09 ENCOUNTER FOR ELECTRONIC ANALYSIS OF REVEAL EVENT RECORDER: ICD-10-CM

## 2023-06-27 PROCEDURE — G2066 INTER DEVC REMOTE 30D: HCPCS | Performed by: INTERNAL MEDICINE

## 2023-06-27 PROCEDURE — 93298 REM INTERROG DEV EVAL SCRMS: CPT | Performed by: INTERNAL MEDICINE

## 2023-07-17 RX ORDER — DILTIAZEM HYDROCHLORIDE 60 MG/1
CAPSULE, EXTENDED RELEASE ORAL
Qty: 60 CAPSULE | Refills: 5 | Status: SHIPPED | OUTPATIENT
Start: 2023-07-17

## 2023-07-17 NOTE — TELEPHONE ENCOUNTER
Received refill request for diltiazem from [facility] pharmacy.     Last ov: 5/24/2023 NPRS    Last Refill: 05/24/2023 #60 w/ 5 refills    Next appointment: 09/05/2023 OLESYA

## 2023-08-03 PROCEDURE — G2066 INTER DEVC REMOTE 30D: HCPCS | Performed by: INTERNAL MEDICINE

## 2023-08-03 PROCEDURE — 93298 REM INTERROG DEV EVAL SCRMS: CPT | Performed by: INTERNAL MEDICINE

## 2023-08-14 RX ORDER — METOPROLOL TARTRATE 100 MG/1
TABLET ORAL
Qty: 45 TABLET | Refills: 5 | Status: ON HOLD | OUTPATIENT
Start: 2023-08-14

## 2023-08-14 NOTE — TELEPHONE ENCOUNTER
Received refill request for Metoprolol from [facility] pharmacy.     Last ov:2023 NPSR    Last EK2023    Last Refill:2023    Next appointment:2023 OLESYA

## 2023-08-19 ENCOUNTER — APPOINTMENT (OUTPATIENT)
Dept: GENERAL RADIOLOGY | Age: 73
DRG: 313 | End: 2023-08-19
Payer: MEDICARE

## 2023-08-19 ENCOUNTER — HOSPITAL ENCOUNTER (INPATIENT)
Age: 73
LOS: 1 days | Discharge: HOME | DRG: 313 | End: 2023-08-21
Attending: INTERNAL MEDICINE | Admitting: INTERNAL MEDICINE
Payer: MEDICARE

## 2023-08-19 DIAGNOSIS — R07.9 CHEST PAIN, UNSPECIFIED TYPE: Primary | ICD-10-CM

## 2023-08-19 DIAGNOSIS — R77.8 ELEVATED TROPONIN: ICD-10-CM

## 2023-08-19 LAB
ALBUMIN SERPL-MCNC: 4.1 G/DL (ref 3.4–5)
ALBUMIN/GLOB SERPL: 1.3 {RATIO} (ref 1.1–2.2)
ALP SERPL-CCNC: 100 U/L (ref 40–129)
ALT SERPL-CCNC: 13 U/L (ref 10–40)
ANION GAP SERPL CALCULATED.3IONS-SCNC: 10 MMOL/L (ref 3–16)
AST SERPL-CCNC: 21 U/L (ref 15–37)
BASOPHILS # BLD: 0.1 K/UL (ref 0–0.2)
BASOPHILS NFR BLD: 0.9 %
BILIRUB SERPL-MCNC: <0.2 MG/DL (ref 0–1)
BUN SERPL-MCNC: 19 MG/DL (ref 7–20)
CALCIUM SERPL-MCNC: 9.6 MG/DL (ref 8.3–10.6)
CHLORIDE SERPL-SCNC: 100 MMOL/L (ref 99–110)
CO2 SERPL-SCNC: 26 MMOL/L (ref 21–32)
CREAT SERPL-MCNC: 0.6 MG/DL (ref 0.6–1.2)
DEPRECATED RDW RBC AUTO: 13.9 % (ref 12.4–15.4)
EOSINOPHIL # BLD: 0.1 K/UL (ref 0–0.6)
EOSINOPHIL NFR BLD: 1 %
GFR SERPLBLD CREATININE-BSD FMLA CKD-EPI: >60 ML/MIN/{1.73_M2}
GLUCOSE BLD-MCNC: 130 MG/DL (ref 70–99)
GLUCOSE BLD-MCNC: 147 MG/DL (ref 70–99)
GLUCOSE SERPL-MCNC: 128 MG/DL (ref 70–99)
HCT VFR BLD AUTO: 37.1 % (ref 36–48)
HGB BLD-MCNC: 12 G/DL (ref 12–16)
LYMPHOCYTES # BLD: 1.3 K/UL (ref 1–5.1)
LYMPHOCYTES NFR BLD: 18.7 %
MAGNESIUM SERPL-MCNC: 1.8 MG/DL (ref 1.8–2.4)
MCH RBC QN AUTO: 28.9 PG (ref 26–34)
MCHC RBC AUTO-ENTMCNC: 32.2 G/DL (ref 31–36)
MCV RBC AUTO: 89.8 FL (ref 80–100)
MONOCYTES # BLD: 0.5 K/UL (ref 0–1.3)
MONOCYTES NFR BLD: 7 %
NEUTROPHILS # BLD: 5 K/UL (ref 1.7–7.7)
NEUTROPHILS NFR BLD: 72.4 %
NT-PROBNP SERPL-MCNC: 1341 PG/ML (ref 0–124)
PERFORMED ON: ABNORMAL
PERFORMED ON: ABNORMAL
PLATELET # BLD AUTO: 407 K/UL (ref 135–450)
PMV BLD AUTO: 6.5 FL (ref 5–10.5)
POTASSIUM SERPL-SCNC: 5.3 MMOL/L (ref 3.5–5.1)
PROT SERPL-MCNC: 7.3 G/DL (ref 6.4–8.2)
RBC # BLD AUTO: 4.14 M/UL (ref 4–5.2)
SODIUM SERPL-SCNC: 136 MMOL/L (ref 136–145)
TROPONIN, HIGH SENSITIVITY: 14 NG/L (ref 0–14)
TROPONIN, HIGH SENSITIVITY: 15 NG/L (ref 0–14)
TROPONIN, HIGH SENSITIVITY: 16 NG/L (ref 0–14)
TROPONIN, HIGH SENSITIVITY: 17 NG/L (ref 0–14)
WBC # BLD AUTO: 6.9 K/UL (ref 4–11)

## 2023-08-19 PROCEDURE — G0378 HOSPITAL OBSERVATION PER HR: HCPCS

## 2023-08-19 PROCEDURE — 84484 ASSAY OF TROPONIN QUANT: CPT

## 2023-08-19 PROCEDURE — 6370000000 HC RX 637 (ALT 250 FOR IP): Performed by: INTERNAL MEDICINE

## 2023-08-19 PROCEDURE — 80053 COMPREHEN METABOLIC PANEL: CPT

## 2023-08-19 PROCEDURE — 71045 X-RAY EXAM CHEST 1 VIEW: CPT

## 2023-08-19 PROCEDURE — 83735 ASSAY OF MAGNESIUM: CPT

## 2023-08-19 PROCEDURE — 6370000000 HC RX 637 (ALT 250 FOR IP): Performed by: PHYSICIAN ASSISTANT

## 2023-08-19 PROCEDURE — 83036 HEMOGLOBIN GLYCOSYLATED A1C: CPT

## 2023-08-19 PROCEDURE — 83880 ASSAY OF NATRIURETIC PEPTIDE: CPT

## 2023-08-19 PROCEDURE — 2580000003 HC RX 258: Performed by: INTERNAL MEDICINE

## 2023-08-19 PROCEDURE — 99285 EMERGENCY DEPT VISIT HI MDM: CPT

## 2023-08-19 PROCEDURE — 85025 COMPLETE CBC W/AUTO DIFF WBC: CPT

## 2023-08-19 PROCEDURE — 36415 COLL VENOUS BLD VENIPUNCTURE: CPT

## 2023-08-19 PROCEDURE — 93005 ELECTROCARDIOGRAM TRACING: CPT | Performed by: INTERNAL MEDICINE

## 2023-08-19 RX ORDER — INSULIN LISPRO 100 [IU]/ML
0-8 INJECTION, SOLUTION INTRAVENOUS; SUBCUTANEOUS
Status: DISCONTINUED | OUTPATIENT
Start: 2023-08-19 | End: 2023-08-21 | Stop reason: HOSPADM

## 2023-08-19 RX ORDER — MAGNESIUM HYDROXIDE/ALUMINUM HYDROXICE/SIMETHICONE 120; 1200; 1200 MG/30ML; MG/30ML; MG/30ML
30 SUSPENSION ORAL EVERY 6 HOURS PRN
Status: DISCONTINUED | OUTPATIENT
Start: 2023-08-19 | End: 2023-08-21 | Stop reason: HOSPADM

## 2023-08-19 RX ORDER — ONDANSETRON 2 MG/ML
4 INJECTION INTRAMUSCULAR; INTRAVENOUS EVERY 6 HOURS PRN
Status: DISCONTINUED | OUTPATIENT
Start: 2023-08-19 | End: 2023-08-21 | Stop reason: HOSPADM

## 2023-08-19 RX ORDER — LANOLIN ALCOHOL/MO/W.PET/CERES
500 CREAM (GRAM) TOPICAL DAILY
Status: DISCONTINUED | OUTPATIENT
Start: 2023-08-19 | End: 2023-08-21 | Stop reason: HOSPADM

## 2023-08-19 RX ORDER — MECLIZINE HCL 12.5 MG/1
25 TABLET ORAL 3 TIMES DAILY PRN
Status: DISCONTINUED | OUTPATIENT
Start: 2023-08-19 | End: 2023-08-21 | Stop reason: HOSPADM

## 2023-08-19 RX ORDER — ASPIRIN 81 MG/1
162 TABLET, CHEWABLE ORAL ONCE
Status: COMPLETED | OUTPATIENT
Start: 2023-08-19 | End: 2023-08-19

## 2023-08-19 RX ORDER — FERROUS SULFATE 325(65) MG
325 TABLET ORAL
Status: DISCONTINUED | OUTPATIENT
Start: 2023-08-20 | End: 2023-08-21 | Stop reason: HOSPADM

## 2023-08-19 RX ORDER — AMLODIPINE BESYLATE 5 MG/1
5 TABLET ORAL DAILY
Status: DISCONTINUED | OUTPATIENT
Start: 2023-08-19 | End: 2023-08-21 | Stop reason: HOSPADM

## 2023-08-19 RX ORDER — LANOLIN ALCOHOL/MO/W.PET/CERES
400 CREAM (GRAM) TOPICAL DAILY
Status: DISCONTINUED | OUTPATIENT
Start: 2023-08-19 | End: 2023-08-21 | Stop reason: HOSPADM

## 2023-08-19 RX ORDER — CALCIUM CARBONATE 500(1250)
500 TABLET ORAL 2 TIMES DAILY
Status: DISCONTINUED | OUTPATIENT
Start: 2023-08-19 | End: 2023-08-21 | Stop reason: HOSPADM

## 2023-08-19 RX ORDER — SODIUM CHLORIDE 0.9 % (FLUSH) 0.9 %
5-40 SYRINGE (ML) INJECTION EVERY 12 HOURS SCHEDULED
Status: DISCONTINUED | OUTPATIENT
Start: 2023-08-19 | End: 2023-08-21 | Stop reason: HOSPADM

## 2023-08-19 RX ORDER — NITROGLYCERIN 0.4 MG/1
0.4 TABLET SUBLINGUAL EVERY 5 MIN PRN
Status: DISCONTINUED | OUTPATIENT
Start: 2023-08-19 | End: 2023-08-21 | Stop reason: HOSPADM

## 2023-08-19 RX ORDER — ACETAMINOPHEN 650 MG/1
650 SUPPOSITORY RECTAL EVERY 6 HOURS PRN
Status: DISCONTINUED | OUTPATIENT
Start: 2023-08-19 | End: 2023-08-21 | Stop reason: HOSPADM

## 2023-08-19 RX ORDER — DEXTROSE MONOHYDRATE 100 MG/ML
INJECTION, SOLUTION INTRAVENOUS CONTINUOUS PRN
Status: DISCONTINUED | OUTPATIENT
Start: 2023-08-19 | End: 2023-08-21 | Stop reason: HOSPADM

## 2023-08-19 RX ORDER — LISINOPRIL 20 MG/1
20 TABLET ORAL 2 TIMES DAILY
Status: DISCONTINUED | OUTPATIENT
Start: 2023-08-19 | End: 2023-08-21 | Stop reason: HOSPADM

## 2023-08-19 RX ORDER — ONDANSETRON 4 MG/1
4 TABLET, ORALLY DISINTEGRATING ORAL EVERY 8 HOURS PRN
Status: DISCONTINUED | OUTPATIENT
Start: 2023-08-19 | End: 2023-08-21 | Stop reason: HOSPADM

## 2023-08-19 RX ORDER — HYDRALAZINE HYDROCHLORIDE 20 MG/ML
10 INJECTION INTRAMUSCULAR; INTRAVENOUS EVERY 4 HOURS PRN
Status: DISCONTINUED | OUTPATIENT
Start: 2023-08-19 | End: 2023-08-21 | Stop reason: HOSPADM

## 2023-08-19 RX ORDER — SODIUM CHLORIDE 9 MG/ML
INJECTION, SOLUTION INTRAVENOUS PRN
Status: DISCONTINUED | OUTPATIENT
Start: 2023-08-19 | End: 2023-08-21 | Stop reason: HOSPADM

## 2023-08-19 RX ORDER — FAMOTIDINE 20 MG/1
20 TABLET, FILM COATED ORAL 2 TIMES DAILY
Status: DISCONTINUED | OUTPATIENT
Start: 2023-08-19 | End: 2023-08-21 | Stop reason: HOSPADM

## 2023-08-19 RX ORDER — SODIUM CHLORIDE 9 MG/ML
INJECTION, SOLUTION INTRAVENOUS CONTINUOUS
Status: DISCONTINUED | OUTPATIENT
Start: 2023-08-19 | End: 2023-08-21

## 2023-08-19 RX ORDER — METOPROLOL TARTRATE 50 MG/1
50 TABLET, FILM COATED ORAL 3 TIMES DAILY
Status: DISCONTINUED | OUTPATIENT
Start: 2023-08-19 | End: 2023-08-21 | Stop reason: HOSPADM

## 2023-08-19 RX ORDER — MORPHINE SULFATE 2 MG/ML
1 INJECTION, SOLUTION INTRAMUSCULAR; INTRAVENOUS EVERY 4 HOURS PRN
Status: DISCONTINUED | OUTPATIENT
Start: 2023-08-19 | End: 2023-08-21 | Stop reason: HOSPADM

## 2023-08-19 RX ORDER — GLUCAGON 1 MG/ML
1 KIT INJECTION PRN
Status: DISCONTINUED | OUTPATIENT
Start: 2023-08-19 | End: 2023-08-21 | Stop reason: HOSPADM

## 2023-08-19 RX ORDER — SODIUM CHLORIDE 0.9 % (FLUSH) 0.9 %
5-40 SYRINGE (ML) INJECTION PRN
Status: DISCONTINUED | OUTPATIENT
Start: 2023-08-19 | End: 2023-08-21 | Stop reason: HOSPADM

## 2023-08-19 RX ORDER — ASPIRIN 81 MG/1
81 TABLET ORAL DAILY
Status: DISCONTINUED | OUTPATIENT
Start: 2023-08-20 | End: 2023-08-21 | Stop reason: HOSPADM

## 2023-08-19 RX ORDER — POLYETHYLENE GLYCOL 3350 17 G/17G
17 POWDER, FOR SOLUTION ORAL DAILY PRN
Status: DISCONTINUED | OUTPATIENT
Start: 2023-08-19 | End: 2023-08-21 | Stop reason: HOSPADM

## 2023-08-19 RX ORDER — INSULIN LISPRO 100 [IU]/ML
0-4 INJECTION, SOLUTION INTRAVENOUS; SUBCUTANEOUS NIGHTLY
Status: DISCONTINUED | OUTPATIENT
Start: 2023-08-19 | End: 2023-08-21 | Stop reason: HOSPADM

## 2023-08-19 RX ORDER — PRAVASTATIN SODIUM 20 MG
20 TABLET ORAL DAILY
Status: DISCONTINUED | OUTPATIENT
Start: 2023-08-19 | End: 2023-08-21 | Stop reason: HOSPADM

## 2023-08-19 RX ORDER — DILTIAZEM HYDROCHLORIDE 60 MG/1
60 CAPSULE, EXTENDED RELEASE ORAL 2 TIMES DAILY
Status: DISCONTINUED | OUTPATIENT
Start: 2023-08-19 | End: 2023-08-21 | Stop reason: HOSPADM

## 2023-08-19 RX ORDER — ACETAMINOPHEN 325 MG/1
650 TABLET ORAL EVERY 6 HOURS PRN
Status: DISCONTINUED | OUTPATIENT
Start: 2023-08-19 | End: 2023-08-21 | Stop reason: HOSPADM

## 2023-08-19 RX ADMIN — METOPROLOL TARTRATE 50 MG: 50 TABLET ORAL at 20:19

## 2023-08-19 RX ADMIN — Medication 500 MG: at 20:19

## 2023-08-19 RX ADMIN — DILTIAZEM HYDROCHLORIDE 60 MG: 60 CAPSULE, EXTENDED RELEASE ORAL at 20:19

## 2023-08-19 RX ADMIN — ASPIRIN 81 MG 162 MG: 81 TABLET ORAL at 13:52

## 2023-08-19 RX ADMIN — SODIUM CHLORIDE, PRESERVATIVE FREE 10 ML: 5 INJECTION INTRAVENOUS at 20:18

## 2023-08-19 RX ADMIN — APIXABAN 5 MG: 5 TABLET, FILM COATED ORAL at 20:19

## 2023-08-19 RX ADMIN — FAMOTIDINE 20 MG: 20 TABLET, FILM COATED ORAL at 20:19

## 2023-08-19 RX ADMIN — ACETAMINOPHEN 650 MG: 325 TABLET ORAL at 21:55

## 2023-08-19 RX ADMIN — PRAVASTATIN SODIUM 20 MG: 20 TABLET ORAL at 20:19

## 2023-08-19 RX ADMIN — LISINOPRIL 20 MG: 20 TABLET ORAL at 20:19

## 2023-08-19 ASSESSMENT — PAIN DESCRIPTION - DESCRIPTORS: DESCRIPTORS: ACHING

## 2023-08-19 ASSESSMENT — PAIN - FUNCTIONAL ASSESSMENT: PAIN_FUNCTIONAL_ASSESSMENT: NONE - DENIES PAIN

## 2023-08-19 ASSESSMENT — PAIN DESCRIPTION - ONSET: ONSET: GRADUAL

## 2023-08-19 ASSESSMENT — PAIN DESCRIPTION - ORIENTATION: ORIENTATION: MID

## 2023-08-19 ASSESSMENT — HEART SCORE: ECG: 0

## 2023-08-19 ASSESSMENT — PAIN DESCRIPTION - LOCATION: LOCATION: BACK

## 2023-08-19 ASSESSMENT — PAIN SCALES - GENERAL
PAINLEVEL_OUTOF10: 0
PAINLEVEL_OUTOF10: 3

## 2023-08-19 ASSESSMENT — PAIN DESCRIPTION - PAIN TYPE: TYPE: CHRONIC PAIN

## 2023-08-19 ASSESSMENT — PAIN DESCRIPTION - FREQUENCY: FREQUENCY: CONTINUOUS

## 2023-08-19 NOTE — ED NOTES
ED TO INPATIENT SBAR HANDOFF    Patient Name: Rocky Mendez   :  1950  72 y.o. MRN:  [id number]  Preferred Name  [address]  ED Room #:  ED-0013/13  Family/Caregiver Present no   Restraints no   Sitter no   Sepsis Risk Score Sepsis Risk Score: 0.69    Situation  Code Status: Prior No additional code details. Allergies: Codeine and Demerol  Weight: Patient Vitals for the past 96 hrs (Last 3 readings):   Weight   23 1316 160 lb (72.6 kg)     Arrived from: home  Chief Complaint:   Chief Complaint   Patient presents with    Irregular Heart Beat     In by EMS after episode of afib this morning that began at rest, states resolved after taking her metoprolol. History of afib, on eliquis. States happens \"about once a month\". Hospital Problem/Diagnosis:  Principal Problem:    Chest pain  Active Problems:    CAD (coronary artery disease)    HTN (hypertension), benign    Type 2 diabetes mellitus, without long-term current use of insulin ([facility])    Tobacco abuse    PAF (paroxysmal atrial fibrillation) ([address])  Resolved Problems:    * No resolved hospital problems. *    Imaging:   XR CHEST PORTABLE   Final Result   No acute cardiopulmonary process.            Abnormal labs:   Abnormal Labs Reviewed   COMPREHENSIVE METABOLIC PANEL W/ REFLEX TO MG FOR LOW K - Abnormal; Notable for the following components:       Result Value    Potassium reflex Magnesium 5.3 (*)     Glucose 128 (*)     All other components within normal limits   TROPONIN - Abnormal; Notable for the following components:    Troponin, High Sensitivity 16 (*)     All other components within normal limits   BRAIN NATRIURETIC PEPTIDE - Abnormal; Notable for the following components:    Pro-BNP 1,341 (*)     All other components within normal limits     Critical values: no     Abnormal Assessment Findings: N/A    Background  History:   Past Medical History:   Diagnosis Date    Acute CVA (cerebrovascular accident) ([address]) 2021    Arthritis     CAD

## 2023-08-19 NOTE — ACP (ADVANCE CARE PLANNING)
Advanced Care Planning Note. Purpose of Encounter: Advanced care planning in light of CAD  Parties In Attendance: Patient, brother, niece  Decisional Capacity: Yes  Subjective: Patient with CP and SOB  Objective: Cr 0.6  Goals of Care Determination: Patient wants full support (CPR, vent, surgery, HD, trach, PEG)  Plan: Telemetry, troponin, Cardiology consult, STOP smoking  Code Status: Full code   Time spent on Advanced care Plannin minutes  Advanced Care Planning Documents: Completed advanced directives on chart, brother is the POA.     Boom Jones MD  2023 4:07 PM

## 2023-08-20 LAB
ANION GAP SERPL CALCULATED.3IONS-SCNC: 9 MMOL/L (ref 3–16)
BASOPHILS # BLD: 0 K/UL (ref 0–0.2)
BASOPHILS NFR BLD: 0.7 %
BUN SERPL-MCNC: 15 MG/DL (ref 7–20)
CALCIUM SERPL-MCNC: 9.8 MG/DL (ref 8.3–10.6)
CHLORIDE SERPL-SCNC: 101 MMOL/L (ref 99–110)
CO2 SERPL-SCNC: 26 MMOL/L (ref 21–32)
CREAT SERPL-MCNC: 0.6 MG/DL (ref 0.6–1.2)
DEPRECATED RDW RBC AUTO: 13.8 % (ref 12.4–15.4)
EKG ATRIAL RATE: 62 BPM
EKG DIAGNOSIS: NORMAL
EKG P AXIS: 86 DEGREES
EKG P-R INTERVAL: 152 MS
EKG Q-T INTERVAL: 412 MS
EKG QRS DURATION: 68 MS
EKG QTC CALCULATION (BAZETT): 418 MS
EKG R AXIS: -6 DEGREES
EKG T AXIS: 22 DEGREES
EKG VENTRICULAR RATE: 62 BPM
EOSINOPHIL # BLD: 0.1 K/UL (ref 0–0.6)
EOSINOPHIL NFR BLD: 1.4 %
EST. AVERAGE GLUCOSE BLD GHB EST-MCNC: 128.4 MG/DL
GFR SERPLBLD CREATININE-BSD FMLA CKD-EPI: >60 ML/MIN/{1.73_M2}
GLUCOSE BLD-MCNC: 115 MG/DL (ref 70–99)
GLUCOSE BLD-MCNC: 138 MG/DL (ref 70–99)
GLUCOSE BLD-MCNC: 204 MG/DL (ref 70–99)
GLUCOSE BLD-MCNC: 292 MG/DL (ref 70–99)
GLUCOSE SERPL-MCNC: 108 MG/DL (ref 70–99)
HBA1C MFR BLD: 6.1 %
HCT VFR BLD AUTO: 34.5 % (ref 36–48)
HGB BLD-MCNC: 11.4 G/DL (ref 12–16)
LYMPHOCYTES # BLD: 1.7 K/UL (ref 1–5.1)
LYMPHOCYTES NFR BLD: 27.5 %
MCH RBC QN AUTO: 29.2 PG (ref 26–34)
MCHC RBC AUTO-ENTMCNC: 32.9 G/DL (ref 31–36)
MCV RBC AUTO: 88.7 FL (ref 80–100)
MONOCYTES # BLD: 0.6 K/UL (ref 0–1.3)
MONOCYTES NFR BLD: 10.4 %
NEUTROPHILS # BLD: 3.7 K/UL (ref 1.7–7.7)
NEUTROPHILS NFR BLD: 60 %
PERFORMED ON: ABNORMAL
PLATELET # BLD AUTO: 374 K/UL (ref 135–450)
PMV BLD AUTO: 6.7 FL (ref 5–10.5)
POTASSIUM SERPL-SCNC: 4.5 MMOL/L (ref 3.5–5.1)
RBC # BLD AUTO: 3.89 M/UL (ref 4–5.2)
SODIUM SERPL-SCNC: 136 MMOL/L (ref 136–145)
TROPONIN, HIGH SENSITIVITY: 17 NG/L (ref 0–14)
TROPONIN, HIGH SENSITIVITY: 19 NG/L (ref 0–14)
WBC # BLD AUTO: 6.2 K/UL (ref 4–11)

## 2023-08-20 PROCEDURE — G0378 HOSPITAL OBSERVATION PER HR: HCPCS

## 2023-08-20 PROCEDURE — 6370000000 HC RX 637 (ALT 250 FOR IP): Performed by: INTERNAL MEDICINE

## 2023-08-20 PROCEDURE — 97165 OT EVAL LOW COMPLEX 30 MIN: CPT

## 2023-08-20 PROCEDURE — 1200000000 HC SEMI PRIVATE

## 2023-08-20 PROCEDURE — 99222 1ST HOSP IP/OBS MODERATE 55: CPT | Performed by: INTERNAL MEDICINE

## 2023-08-20 PROCEDURE — 85025 COMPLETE CBC W/AUTO DIFF WBC: CPT

## 2023-08-20 PROCEDURE — 84484 ASSAY OF TROPONIN QUANT: CPT

## 2023-08-20 PROCEDURE — 93010 ELECTROCARDIOGRAM REPORT: CPT | Performed by: INTERNAL MEDICINE

## 2023-08-20 PROCEDURE — 80048 BASIC METABOLIC PNL TOTAL CA: CPT

## 2023-08-20 PROCEDURE — 97530 THERAPEUTIC ACTIVITIES: CPT

## 2023-08-20 PROCEDURE — 2580000003 HC RX 258: Performed by: INTERNAL MEDICINE

## 2023-08-20 RX ORDER — REGADENOSON 0.08 MG/ML
0.4 INJECTION, SOLUTION INTRAVENOUS
Status: COMPLETED | OUTPATIENT
Start: 2023-08-20 | End: 2023-08-21

## 2023-08-20 RX ADMIN — METOPROLOL TARTRATE 50 MG: 50 TABLET ORAL at 08:15

## 2023-08-20 RX ADMIN — Medication 500 MG: at 08:14

## 2023-08-20 RX ADMIN — APIXABAN 5 MG: 5 TABLET, FILM COATED ORAL at 20:16

## 2023-08-20 RX ADMIN — METOPROLOL TARTRATE 50 MG: 50 TABLET ORAL at 13:42

## 2023-08-20 RX ADMIN — SODIUM CHLORIDE, PRESERVATIVE FREE 10 ML: 5 INJECTION INTRAVENOUS at 08:16

## 2023-08-20 RX ADMIN — FAMOTIDINE 20 MG: 20 TABLET, FILM COATED ORAL at 20:16

## 2023-08-20 RX ADMIN — ASPIRIN 81 MG: 81 TABLET, COATED ORAL at 08:14

## 2023-08-20 RX ADMIN — SODIUM CHLORIDE: 9 INJECTION, SOLUTION INTRAVENOUS at 20:35

## 2023-08-20 RX ADMIN — APIXABAN 5 MG: 5 TABLET, FILM COATED ORAL at 08:15

## 2023-08-20 RX ADMIN — AMLODIPINE BESYLATE 5 MG: 5 TABLET ORAL at 08:15

## 2023-08-20 RX ADMIN — DILTIAZEM HYDROCHLORIDE 60 MG: 60 CAPSULE, EXTENDED RELEASE ORAL at 20:18

## 2023-08-20 RX ADMIN — FAMOTIDINE 20 MG: 20 TABLET, FILM COATED ORAL at 08:14

## 2023-08-20 RX ADMIN — FERROUS SULFATE TAB 325 MG (65 MG ELEMENTAL FE) 325 MG: 325 (65 FE) TAB at 08:14

## 2023-08-20 RX ADMIN — INSULIN LISPRO 2 UNITS: 100 INJECTION, SOLUTION INTRAVENOUS; SUBCUTANEOUS at 17:22

## 2023-08-20 RX ADMIN — ACETAMINOPHEN 650 MG: 325 TABLET ORAL at 08:21

## 2023-08-20 RX ADMIN — SODIUM CHLORIDE, PRESERVATIVE FREE 10 ML: 5 INJECTION INTRAVENOUS at 20:35

## 2023-08-20 RX ADMIN — PRAVASTATIN SODIUM 20 MG: 20 TABLET ORAL at 20:18

## 2023-08-20 RX ADMIN — METOPROLOL TARTRATE 50 MG: 50 TABLET ORAL at 20:16

## 2023-08-20 RX ADMIN — Medication 500 MCG: at 08:13

## 2023-08-20 RX ADMIN — SODIUM CHLORIDE, PRESERVATIVE FREE 10 ML: 5 INJECTION INTRAVENOUS at 20:16

## 2023-08-20 RX ADMIN — LISINOPRIL 20 MG: 20 TABLET ORAL at 08:15

## 2023-08-20 RX ADMIN — Medication 500 MG: at 20:16

## 2023-08-20 RX ADMIN — DILTIAZEM HYDROCHLORIDE 60 MG: 60 CAPSULE, EXTENDED RELEASE ORAL at 08:21

## 2023-08-20 RX ADMIN — Medication 400 MG: at 08:14

## 2023-08-20 RX ADMIN — LISINOPRIL 20 MG: 20 TABLET ORAL at 20:16

## 2023-08-20 RX ADMIN — ACETAMINOPHEN 650 MG: 325 TABLET ORAL at 20:16

## 2023-08-20 ASSESSMENT — PAIN DESCRIPTION - FREQUENCY: FREQUENCY: CONTINUOUS

## 2023-08-20 ASSESSMENT — PAIN SCALES - GENERAL
PAINLEVEL_OUTOF10: 0
PAINLEVEL_OUTOF10: 3
PAINLEVEL_OUTOF10: 1
PAINLEVEL_OUTOF10: 0
PAINLEVEL_OUTOF10: 0

## 2023-08-20 ASSESSMENT — PAIN DESCRIPTION - LOCATION
LOCATION: GENERALIZED
LOCATION: BACK

## 2023-08-20 ASSESSMENT — PAIN DESCRIPTION - DESCRIPTORS: DESCRIPTORS: ACHING

## 2023-08-20 ASSESSMENT — PAIN DESCRIPTION - PAIN TYPE: TYPE: CHRONIC PAIN

## 2023-08-20 ASSESSMENT — PAIN DESCRIPTION - ONSET: ONSET: ON-GOING

## 2023-08-20 ASSESSMENT — PAIN - FUNCTIONAL ASSESSMENT: PAIN_FUNCTIONAL_ASSESSMENT: PREVENTS OR INTERFERES WITH MANY ACTIVE NOT PASSIVE ACTIVITIES

## 2023-08-20 NOTE — CONSULTS
[facility]  Cardiac Consult     Referring Provider:  Enedina Richards         History of Present Illness:  82-year-old female who we were asked to see in consultation for chest pain. She has known coronary artery disease with prior coronary artery bypass grafting in 1993. He has been about 5 years since her last stress test.  She also has history of gastric bypass with extensive weight loss and atrial fibrillation/flutter. She is followed by the electrophysiology service. She has a loop recorder in place. She says that yesterday she could hardly feel her heart beating. She thinks it may have been slow. She had associated tightness/pressure in her chest.  There were no clear exacerbating or relieving factors. This lasted for several minutes with some associated nausea and resolved. She came to the emergency room for evaluation. She was in sinus rhythm. Troponin is minimally elevated and flat. EKG shows no obvious ischemic changes. Past Medical History:   has a past medical history of Acute CVA (cerebrovascular accident) ([address]), Arthritis, CAD (coronary artery disease), Diabetes mellitus ([address]), Hyperlipidemia, and Hypertension. Surgical History:   has a past surgical history that includes Coronary artery bypass graft (1993); Gastric bypass surgery (2004); Cardiac surgery (2001); Dilatation, esophagus; Cholecystectomy; and Total hip arthroplasty (Left, 03/27/2023). Social History:   reports that she has been smoking cigarettes. She has a 30.00 pack-year smoking history. She has never used smokeless tobacco. She reports that she does not drink alcohol and does not use drugs. Family History:  family history includes Cancer in her father, maternal aunt, and mother; Diabetes in her father; Heart Disease in her father.      Medications:   calcium elemental  500 mg Oral BID    metFORMIN  850 mg Oral BID WC    vitamin B-12  500 mcg Oral Daily    ferrous sulfate  325 mg Oral Daily with

## 2023-08-20 NOTE — PLAN OF CARE
Problem: Discharge Planning  Goal: Discharge to home or other facility with appropriate resources  8/20/2023 0929 by Daksha Gandhi RN  Outcome: Progressing     Problem: Safety - Adult  Goal: Free from fall injury  8/20/2023 0929 by Daksha Gandhi RN  Outcome: Progressing     Problem: Pain  Goal: Verbalizes/displays adequate comfort level or baseline comfort level  8/20/2023 0929 by Daksha Gandhi RN  Outcome: Progressing     Problem: Chronic Conditions and Co-morbidities  Goal: Patient's chronic conditions and co-morbidity symptoms are monitored and maintained or improved  Outcome: Progressing

## 2023-08-20 NOTE — PLAN OF CARE
Problem: Discharge Planning  Goal: Discharge to home or other facility with appropriate resources  8/20/2023 0058 by Yulissa Cruz RN  Outcome: Progressing  8/19/2023 1800 by Mikhail Rg RN  Outcome: Progressing     Problem: Safety - Adult  Goal: Free from fall injury  8/20/2023 0058 by Yulissa Cruz RN  Outcome: Progressing  8/19/2023 1800 by Mikhail Rg RN  Outcome: Progressing     Problem: Pain  Goal: Verbalizes/displays adequate comfort level or baseline comfort level  Outcome: Progressing

## 2023-08-21 VITALS
TEMPERATURE: 97.6 F | HEIGHT: 60 IN | BODY MASS INDEX: 32.72 KG/M2 | WEIGHT: 166.67 LBS | HEART RATE: 87 BPM | RESPIRATION RATE: 16 BRPM | OXYGEN SATURATION: 100 % | DIASTOLIC BLOOD PRESSURE: 58 MMHG | SYSTOLIC BLOOD PRESSURE: 148 MMHG

## 2023-08-21 LAB
ANION GAP SERPL CALCULATED.3IONS-SCNC: 9 MMOL/L (ref 3–16)
BASOPHILS # BLD: 0.1 K/UL (ref 0–0.2)
BASOPHILS NFR BLD: 1.2 %
BUN SERPL-MCNC: 15 MG/DL (ref 7–20)
CALCIUM SERPL-MCNC: 9.2 MG/DL (ref 8.3–10.6)
CHLORIDE SERPL-SCNC: 99 MMOL/L (ref 99–110)
CO2 SERPL-SCNC: 24 MMOL/L (ref 21–32)
CREAT SERPL-MCNC: 0.7 MG/DL (ref 0.6–1.2)
DEPRECATED RDW RBC AUTO: 13.9 % (ref 12.4–15.4)
EOSINOPHIL # BLD: 0.1 K/UL (ref 0–0.6)
EOSINOPHIL NFR BLD: 1.2 %
GFR SERPLBLD CREATININE-BSD FMLA CKD-EPI: >60 ML/MIN/{1.73_M2}
GLUCOSE BLD-MCNC: 118 MG/DL (ref 70–99)
GLUCOSE BLD-MCNC: 212 MG/DL (ref 70–99)
GLUCOSE SERPL-MCNC: 118 MG/DL (ref 70–99)
HCT VFR BLD AUTO: 34.3 % (ref 36–48)
HGB BLD-MCNC: 11.2 G/DL (ref 12–16)
LV EF: 68 %
LV EF: 77 %
LVEF MODALITY: NORMAL
LVEF MODALITY: NORMAL
LYMPHOCYTES # BLD: 1.7 K/UL (ref 1–5.1)
LYMPHOCYTES NFR BLD: 28.8 %
MCH RBC QN AUTO: 28.9 PG (ref 26–34)
MCHC RBC AUTO-ENTMCNC: 32.6 G/DL (ref 31–36)
MCV RBC AUTO: 88.7 FL (ref 80–100)
MONOCYTES # BLD: 0.5 K/UL (ref 0–1.3)
MONOCYTES NFR BLD: 9.3 %
NEUTROPHILS # BLD: 3.4 K/UL (ref 1.7–7.7)
NEUTROPHILS NFR BLD: 59.5 %
PERFORMED ON: ABNORMAL
PERFORMED ON: ABNORMAL
PLATELET # BLD AUTO: 348 K/UL (ref 135–450)
PMV BLD AUTO: 6.9 FL (ref 5–10.5)
POTASSIUM SERPL-SCNC: 4.5 MMOL/L (ref 3.5–5.1)
RBC # BLD AUTO: 3.87 M/UL (ref 4–5.2)
SODIUM SERPL-SCNC: 132 MMOL/L (ref 136–145)
WBC # BLD AUTO: 5.7 K/UL (ref 4–11)

## 2023-08-21 PROCEDURE — 78452 HT MUSCLE IMAGE SPECT MULT: CPT | Performed by: INTERNAL MEDICINE

## 2023-08-21 PROCEDURE — 80048 BASIC METABOLIC PNL TOTAL CA: CPT

## 2023-08-21 PROCEDURE — 99233 SBSQ HOSP IP/OBS HIGH 50: CPT | Performed by: NURSE PRACTITIONER

## 2023-08-21 PROCEDURE — A9502 TC99M TETROFOSMIN: HCPCS | Performed by: INTERNAL MEDICINE

## 2023-08-21 PROCEDURE — 93306 TTE W/DOPPLER COMPLETE: CPT

## 2023-08-21 PROCEDURE — 85025 COMPLETE CBC W/AUTO DIFF WBC: CPT

## 2023-08-21 PROCEDURE — 93017 CV STRESS TEST TRACING ONLY: CPT | Performed by: INTERNAL MEDICINE

## 2023-08-21 PROCEDURE — 6360000002 HC RX W HCPCS: Performed by: INTERNAL MEDICINE

## 2023-08-21 PROCEDURE — 36415 COLL VENOUS BLD VENIPUNCTURE: CPT

## 2023-08-21 PROCEDURE — 3430000000 HC RX DIAGNOSTIC RADIOPHARMACEUTICAL: Performed by: INTERNAL MEDICINE

## 2023-08-21 PROCEDURE — 6370000000 HC RX 637 (ALT 250 FOR IP): Performed by: INTERNAL MEDICINE

## 2023-08-21 RX ADMIN — TETROFOSMIN 10 MILLICURIE: 1.38 INJECTION, POWDER, LYOPHILIZED, FOR SOLUTION INTRAVENOUS at 09:17

## 2023-08-21 RX ADMIN — REGADENOSON 0.4 MG: 0.08 INJECTION, SOLUTION INTRAVENOUS at 10:01

## 2023-08-21 RX ADMIN — LISINOPRIL 20 MG: 20 TABLET ORAL at 12:21

## 2023-08-21 RX ADMIN — Medication 500 MCG: at 12:21

## 2023-08-21 RX ADMIN — APIXABAN 5 MG: 5 TABLET, FILM COATED ORAL at 12:21

## 2023-08-21 RX ADMIN — Medication 400 MG: at 12:21

## 2023-08-21 RX ADMIN — TETROFOSMIN 30 MILLICURIE: 1.38 INJECTION, POWDER, LYOPHILIZED, FOR SOLUTION INTRAVENOUS at 10:06

## 2023-08-21 RX ADMIN — AMLODIPINE BESYLATE 5 MG: 5 TABLET ORAL at 12:21

## 2023-08-21 RX ADMIN — DILTIAZEM HYDROCHLORIDE 60 MG: 60 CAPSULE, EXTENDED RELEASE ORAL at 12:21

## 2023-08-21 RX ADMIN — METOPROLOL TARTRATE 50 MG: 50 TABLET ORAL at 12:21

## 2023-08-21 RX ADMIN — FAMOTIDINE 20 MG: 20 TABLET, FILM COATED ORAL at 12:21

## 2023-08-21 RX ADMIN — ASPIRIN 81 MG: 81 TABLET, COATED ORAL at 12:21

## 2023-08-21 RX ADMIN — Medication 500 MG: at 12:21

## 2023-08-21 RX ADMIN — FERROUS SULFATE TAB 325 MG (65 MG ELEMENTAL FE) 325 MG: 325 (65 FE) TAB at 12:21

## 2023-08-21 ASSESSMENT — PAIN DESCRIPTION - LOCATION: LOCATION: BACK

## 2023-08-21 ASSESSMENT — PAIN DESCRIPTION - DESCRIPTORS: DESCRIPTORS: ACHING

## 2023-08-21 ASSESSMENT — PAIN SCALES - GENERAL
PAINLEVEL_OUTOF10: 0
PAINLEVEL_OUTOF10: 0
PAINLEVEL_OUTOF10: 7

## 2023-08-23 NOTE — DISCHARGE SUMMARY
[address]  HOSPITALISTS DISCHARGE SUMMARY    Patient Demographics    Patient. Elissa Caicedo  Date of Birth. 1950  MRN. [id number]     Primary care provider. Lu Hunter  (Tel: [phone])    Admit date: 8/19/2023    Discharge date (blank if same as Note Date): 8/21/2023  Note Date: 8/23/2023     Reason for Hospitalization. Chief Complaint   Patient presents with    Irregular Heart Beat     In by EMS after episode of afib this morning that began at rest, states resolved after taking her metoprolol. History of afib, on eliquis. States happens \"about once a month\". Significant Findings. Principal Problem:    Chest pain  Active Problems:    CAD (coronary artery disease)    HTN (hypertension), benign    Type 2 diabetes mellitus, without long-term current use of insulin (HCC)    Tobacco abuse    PAF (paroxysmal atrial fibrillation) ([address] [facility])  Resolved Problems:    * No resolved hospital problems. [facility] Course. lEissa Caicedo is a 72 y.o. female with a past medical history of hypertension, hyperlipidemia, DM2, CAD, arthritis who presented with chest pain and SOB. Underwent stress testing that was normal LV size and function. Cardiology evaluated and cleared for discharge. PT/OT evaluated and no therapy recommended for home.      Assessment and Plan:  Chest pain  No recurrent Chest pressure since admission, no SOB   Serial troponin flat , slightly elevated   NTG PRN  Morphine IV PRN severe pain  Keep on telemetry to r/o arrhythmia  Cardiology consult- Stress test in am   Afib  SR/SB on telemetry today   Continue Eliquis, Metoprolol and Cardizem  ILR in place  DM 2  Continue MTF  BG have been 118-292, continue medium SSI  A1c 6.1- continue home medications at discharge  HTN  Continue Norvasc, Metoprolol, Lisinopril and Cardizem  Controlled   CAD  Continue Eliquis, ASA, Metoprolol, Lisinopril and Pravachol  Tobacco abuse disorder  Counseled on cessation for 12

## 2023-08-30 PROBLEM — I65.23 BILATERAL CAROTID ARTERY STENOSIS: Status: ACTIVE | Noted: 2023-08-30

## 2023-09-01 ENCOUNTER — APPOINTMENT (OUTPATIENT)
Dept: GENERAL RADIOLOGY | Age: 73
End: 2023-09-01
Payer: MEDICARE

## 2023-09-01 ENCOUNTER — HOSPITAL ENCOUNTER (EMERGENCY)
Age: 73
Discharge: HOME | End: 2023-09-01
Attending: EMERGENCY MEDICINE
Payer: MEDICARE

## 2023-09-01 VITALS
SYSTOLIC BLOOD PRESSURE: 136 MMHG | BODY MASS INDEX: 31.64 KG/M2 | WEIGHT: 162 LBS | RESPIRATION RATE: 17 BRPM | DIASTOLIC BLOOD PRESSURE: 76 MMHG | HEART RATE: 60 BPM | TEMPERATURE: 97.7 F | OXYGEN SATURATION: 99 %

## 2023-09-01 DIAGNOSIS — I48.0 PAROXYSMAL A-FIB (HCC): Primary | ICD-10-CM

## 2023-09-01 LAB
ALBUMIN SERPL-MCNC: 4.6 G/DL (ref 3.4–5)
ALBUMIN/GLOB SERPL: 1.3 {RATIO} (ref 1.1–2.2)
ALP SERPL-CCNC: 107 U/L (ref 40–129)
ALT SERPL-CCNC: 15 U/L (ref 10–40)
ANION GAP SERPL CALCULATED.3IONS-SCNC: 13 MMOL/L (ref 3–16)
AST SERPL-CCNC: 22 U/L (ref 15–37)
BASOPHILS # BLD: 0.1 K/UL (ref 0–0.2)
BASOPHILS NFR BLD: 0.7 %
BILIRUB SERPL-MCNC: <0.2 MG/DL (ref 0–1)
BUN SERPL-MCNC: 15 MG/DL (ref 7–20)
CALCIUM SERPL-MCNC: 10 MG/DL (ref 8.3–10.6)
CHLORIDE SERPL-SCNC: 93 MMOL/L (ref 99–110)
CO2 SERPL-SCNC: 26 MMOL/L (ref 21–32)
CREAT SERPL-MCNC: 0.6 MG/DL (ref 0.6–1.2)
DEPRECATED RDW RBC AUTO: 13.5 % (ref 12.4–15.4)
EKG ATRIAL RATE: 288 BPM
EKG ATRIAL RATE: 57 BPM
EKG DIAGNOSIS: NORMAL
EKG DIAGNOSIS: NORMAL
EKG P AXIS: 90 DEGREES
EKG P-R INTERVAL: 162 MS
EKG Q-T INTERVAL: 412 MS
EKG Q-T INTERVAL: 422 MS
EKG QRS DURATION: 68 MS
EKG QRS DURATION: 68 MS
EKG QTC CALCULATION (BAZETT): 401 MS
EKG QTC CALCULATION (BAZETT): 407 MS
EKG R AXIS: -3 DEGREES
EKG R AXIS: 1 DEGREES
EKG T AXIS: 31 DEGREES
EKG T AXIS: 39 DEGREES
EKG VENTRICULAR RATE: 56 BPM
EKG VENTRICULAR RATE: 57 BPM
EOSINOPHIL # BLD: 0.1 K/UL (ref 0–0.6)
EOSINOPHIL NFR BLD: 0.9 %
GFR SERPLBLD CREATININE-BSD FMLA CKD-EPI: >60 ML/MIN/{1.73_M2}
GLUCOSE SERPL-MCNC: 132 MG/DL (ref 70–99)
HCT VFR BLD AUTO: 40 % (ref 36–48)
HGB BLD-MCNC: 13.2 G/DL (ref 12–16)
LYMPHOCYTES # BLD: 1.7 K/UL (ref 1–5.1)
LYMPHOCYTES NFR BLD: 18.3 %
MCH RBC QN AUTO: 29.4 PG (ref 26–34)
MCHC RBC AUTO-ENTMCNC: 33 G/DL (ref 31–36)
MCV RBC AUTO: 89 FL (ref 80–100)
MONOCYTES # BLD: 0.7 K/UL (ref 0–1.3)
MONOCYTES NFR BLD: 7.6 %
NEUTROPHILS # BLD: 6.7 K/UL (ref 1.7–7.7)
NEUTROPHILS NFR BLD: 72.5 %
PLATELET # BLD AUTO: 370 K/UL (ref 135–450)
PMV BLD AUTO: 6.6 FL (ref 5–10.5)
POTASSIUM SERPL-SCNC: 5 MMOL/L (ref 3.5–5.1)
PROT SERPL-MCNC: 8.2 G/DL (ref 6.4–8.2)
RBC # BLD AUTO: 4.5 M/UL (ref 4–5.2)
SODIUM SERPL-SCNC: 132 MMOL/L (ref 136–145)
TROPONIN, HIGH SENSITIVITY: 13 NG/L (ref 0–14)
TROPONIN, HIGH SENSITIVITY: 14 NG/L (ref 0–14)
WBC # BLD AUTO: 9.2 K/UL (ref 4–11)

## 2023-09-01 PROCEDURE — 84484 ASSAY OF TROPONIN QUANT: CPT

## 2023-09-01 PROCEDURE — 99285 EMERGENCY DEPT VISIT HI MDM: CPT

## 2023-09-01 PROCEDURE — 99223 1ST HOSP IP/OBS HIGH 75: CPT | Performed by: INTERNAL MEDICINE

## 2023-09-01 PROCEDURE — 85025 COMPLETE CBC W/AUTO DIFF WBC: CPT

## 2023-09-01 PROCEDURE — 71045 X-RAY EXAM CHEST 1 VIEW: CPT

## 2023-09-01 PROCEDURE — 93005 ELECTROCARDIOGRAM TRACING: CPT | Performed by: EMERGENCY MEDICINE

## 2023-09-01 PROCEDURE — 80053 COMPREHEN METABOLIC PANEL: CPT

## 2023-09-01 PROCEDURE — 93010 ELECTROCARDIOGRAM REPORT: CPT | Performed by: INTERNAL MEDICINE

## 2023-09-01 ASSESSMENT — PAIN - FUNCTIONAL ASSESSMENT: PAIN_FUNCTIONAL_ASSESSMENT: NONE - DENIES PAIN

## 2023-09-01 NOTE — CONSULTS
[address]   Electrophysiology   Date: 9/1/2023  Reason for Consultation: Atrial fibrillation    Consult Requesting Physician: Jose Manuel Salter MD     Chief Complaint   Patient presents with    Palpitations     Pt states they recently lowered her metoprolol dose from 100mg TID to 50 mg TID. She states was instructed to take increase one 50mg metoprolol dose to 100mg when she felt her pulse was quick due to A-fib but she states she didn't feel comfortable doing that today. Denies any CP, reports some SOB when her pulse is fast.        CC: Irregular heart rhythm    HPI: Francisco Edgar is a 72 y.o. female who has presented to the hospital with feeling irregular heart beats and mild shortness of breath. She was recently admitted with chest discomfort and seen by general cardiology. Stress testing and echo was done which were both unremarkable. Stress test with no ischemia and echo with normal EF. She has history of symptomatic paroxysmal atrial fibrillation. She has been seen in office and antiarrhythmic therapy and ablation have been discussed. She was not interested in ablation at the time. Complex PMH for CAD s/p CABG in 1993, DM, HTN, HLD, jorge l bypass with weight loss and PAF. She also has had Atrial flutter. S/p ILR in 2/17/2022. She has converted to sinus rhythm. EP has been consulted in emergency room to assess the patient. Assessment:   PAF, symptomatic  History of atrial flutter   CAD s/p CABG  DM  HTN  HLD    Plan:   High risk [id number]-QIMq score. Anticoagulation is recommended. Eliquis 5 mg bid. She is bradycardic at baseline and antiarrhythmic therapy is limited. I have discussed amiodarone therapy with her again with risks and benefits and she is not interested due to risk of side effects. Symptomatic paroxysmal atrial fibrillation    We discussed progressive nature of atrial fibrillation.  Treatment success decreases when AF becomes persistent and 12/11/2021    Arthritis     CAD (coronary artery disease)     Diabetes mellitus ([address])     Hyperlipidemia     Hypertension         Past Surgical History:   Procedure Laterality Date    CARDIAC SURGERY  2001    stent    CHOLECYSTECTOMY      in 2007    CORONARY ARTERY BYPASS GRAFT  1993    DILATATION, ESOPHAGUS      gastric bypass    GASTRIC BYPASS SURGERY  2004    TOTAL HIP ARTHROPLASTY Left 03/27/2023    LEFT HIP TOTAL ARTHROPLASTY ANTERIOR APPROACH performed by Latonia Oconnor MD at [address] [facility]   Allergen Reactions    Codeine Rash    Demerol Rash        reports that she has been smoking cigarettes. She has a 30.00 pack-year smoking history. She has never used smokeless tobacco. She reports that she does not drink alcohol and does not use drugs. All questions and concerns were addressed to the patient/family. Alternatives to my treatment were discussed. I have discussed the above stated plan and the patient verbalized understanding and agreed with the plan. NOTE: This report was transcribed using voice recognition software. Every effort was made to ensure accuracy, however, inadvertent computerized transcription errors may be present.      Magda Reyes MD, MPH  [address] [place] [address]   Office: [phone]  Fax: [phone]

## 2023-09-01 NOTE — ED PROVIDER NOTES
[address]   EMERGENCY DEPARTMENTENCOUNTER      Pt Name: Ti Smith  MRN: [id number]  [address] [facility] 1950  Date ofevaluation: 9/1/2023  Provider: Chrissy Lang MD    CHIEF COMPLAINT       Chief Complaint   Patient presents with    Palpitations     Pt states they recently lowered her metoprolol dose from 100mg TID to 50 mg TID. She states was instructed to take increase one 50mg metoprolol dose to 100mg when she felt her pulse was quick due to A-fib but she states she didn't feel comfortable doing that today. Denies any CP, reports some SOB when her pulse is fast.        HPI    HISTORY OF PRESENT ILLNESS   (Location/Symptom, Timing/Onset,Context/Setting, Quality, Duration, Modifying Factors, Severity)  Note limiting factors. Ti Smith is a 72 y.o. female who presents to the emergency department with palpitations. This is a 26-year-old female with his history of intermittent atrial fibrillation who presents with some palpitations that started this morning. She complains some shortness of breath with it. The patient has a history of atrial fibrillation and is on Eliquis. She denies any chest pain. NursingNotes were reviewed. Review of Systems    REVIEW OF SYSTEMS    (2-9 systems for level 4, 10 or more for level 5)     Review of Systems   Constitutional: Negative for fever. HENT: Negative for rhinorrhea and sore throat. Eyes: Negative for redness. Respiratory: Negative for shortness of breath. Cardiovascular: Negative for chest pain. Gastrointestinal: Negative for abdominal pain. Genitourinary: Negative for flank pain. Neurological: Negative for headaches. Hematological: Negative for adenopathy. Psychiatric/Behavioral: Negative for confusion. Except as noted above the remainder of the review of systems was reviewed and negative.        PAST MEDICAL HISTORY     Past Medical History:   Diagnosis Date    Acute CVA CONSULTS:  IP CONSULT TO CARDIOLOGY    PROCEDURES:  Unless otherwise noted below, none     Procedures    FINAL IMPRESSION      1. Paroxysmal A-fib [facility])          DISPOSITION/PLAN   DISPOSITION Decision To Discharge 09/01/2023 01:42:18 PM      PATIENT REFERREDTO:  Gillian Bolton MD  [address]  [phone]      As directed      DISCHARGEMEDICATIONS:  New Prescriptions    No medications on file     Controlled Substances Monitoring:     No flowsheet data found.     (Please note that portions of this note were completed with a voice recognition program.  Efforts were made to edit the dictations but occasionally words are mis-transcribed.)    Geovany Keith MD (electronically signed)  Attending Emergency Physician          Geovany Keith MD  09/01/23 9900

## 2023-09-07 PROCEDURE — G2066 INTER DEVC REMOTE 30D: HCPCS | Performed by: INTERNAL MEDICINE

## 2023-09-07 PROCEDURE — 93298 REM INTERROG DEV EVAL SCRMS: CPT | Performed by: INTERNAL MEDICINE

## 2023-09-13 ENCOUNTER — TELEPHONE (OUTPATIENT)
Dept: CARDIOLOGY CLINIC | Age: 73
End: 2023-09-13

## 2023-09-13 NOTE — TELEPHONE ENCOUNTER
Pt daughter called stating that the pt has been in and out of the hospital due to being AFIB, they were expecting a call to get schedule for an ablation and they have not heard anything. Pt is still in AFIB and is not feeling good at all.     Pl advise thank you

## 2023-09-20 NOTE — TELEPHONE ENCOUNTER
Spoke with the patient and got her scheduled for procedure. We went over instructions below and she verbalized understanding     Procedure - ATRIAL FIBRILLATION ABLATION   Date: 11/9/2023  Arrival time: 6:45 am   Procedure time: 7:30 am        Our  will be contacting you with a date and time for your procedure     The morning of your ablation you will need to check in at the registration desk in the main lobby. PRE-PROCEDURE INSTRUCTIONS -   -Do not eat or drink anything after midnight the night before your ablation.  -You will need to have a responsible adult to drive you home.  -Your nurse will provide you with discharge instructions.  -You will need to hold your eliquis for the night before and morning of   -If you are taking a diuretic (water pill) please hold the morning of the procedure  -If you take long acting insulin, take 1/2 the dose the evening before or morning of depending on when you take your dosage.  -You may take all of your other medications with a sip of water. You will be scheduled for a 6-8 week follow up with cardiology. This will be done prior to your discharge instructions. If you have any questions regarding the procedure itself or medications, please call [phone] and ask to speak to an EP nurse.       Qgenda updated / Added in [facility] / emailed cath lab

## 2023-10-12 PROCEDURE — G2066 INTER DEVC REMOTE 30D: HCPCS | Performed by: INTERNAL MEDICINE

## 2023-10-12 PROCEDURE — 93298 REM INTERROG DEV EVAL SCRMS: CPT | Performed by: INTERNAL MEDICINE

## 2023-11-09 ENCOUNTER — ANESTHESIA (OUTPATIENT)
Dept: CARDIAC CATH/INVASIVE PROCEDURES | Age: 73
End: 2023-11-09
Payer: MEDICARE

## 2023-11-09 ENCOUNTER — APPOINTMENT (OUTPATIENT)
Dept: CT IMAGING | Age: 73
End: 2023-11-09
Attending: INTERNAL MEDICINE
Payer: MEDICARE

## 2023-11-09 ENCOUNTER — ANESTHESIA EVENT (OUTPATIENT)
Dept: CARDIAC CATH/INVASIVE PROCEDURES | Age: 73
End: 2023-11-09
Payer: MEDICARE

## 2023-11-09 ENCOUNTER — HOSPITAL ENCOUNTER (OUTPATIENT)
Dept: CARDIAC CATH/INVASIVE PROCEDURES | Age: 73
Setting detail: OBSERVATION
Discharge: HOME | End: 2023-11-10
Attending: INTERNAL MEDICINE | Admitting: INTERNAL MEDICINE
Payer: MEDICARE

## 2023-11-09 LAB
ABO + RH BLD: NORMAL
ALBUMIN SERPL-MCNC: 4.5 G/DL (ref 3.4–5)
ALBUMIN/GLOB SERPL: 1.8 {RATIO} (ref 1.1–2.2)
ALP SERPL-CCNC: 103 U/L (ref 40–129)
ALT SERPL-CCNC: 12 U/L (ref 10–40)
ANION GAP SERPL CALCULATED.3IONS-SCNC: 13 MMOL/L (ref 3–16)
AST SERPL-CCNC: 18 U/L (ref 15–37)
BILIRUB SERPL-MCNC: <0.2 MG/DL (ref 0–1)
BLD GP AB SCN SERPL QL: NORMAL
BUN SERPL-MCNC: 10 MG/DL (ref 7–20)
CALCIUM SERPL-MCNC: 9.3 MG/DL (ref 8.3–10.6)
CHLORIDE SERPL-SCNC: 96 MMOL/L (ref 99–110)
CO2 SERPL-SCNC: 26 MMOL/L (ref 21–32)
CREAT SERPL-MCNC: 0.6 MG/DL (ref 0.6–1.2)
DEPRECATED RDW RBC AUTO: 13.4 % (ref 12.4–15.4)
EKG ATRIAL RATE: 63 BPM
EKG DIAGNOSIS: NORMAL
EKG P AXIS: 86 DEGREES
EKG P-R INTERVAL: 152 MS
EKG Q-T INTERVAL: 416 MS
EKG QRS DURATION: 68 MS
EKG QTC CALCULATION (BAZETT): 425 MS
EKG R AXIS: 1 DEGREES
EKG T AXIS: 28 DEGREES
EKG VENTRICULAR RATE: 63 BPM
GFR SERPLBLD CREATININE-BSD FMLA CKD-EPI: >60 ML/MIN/{1.73_M2}
GLUCOSE BLD-MCNC: 207 MG/DL (ref 70–99)
GLUCOSE BLD-MCNC: 306 MG/DL (ref 70–99)
GLUCOSE SERPL-MCNC: 118 MG/DL (ref 70–99)
HCT VFR BLD AUTO: 37 % (ref 36–48)
HCT VFR BLD AUTO: 39.5 % (ref 36–48)
HGB BLD-MCNC: 11.7 G/DL (ref 12–16)
HGB BLD-MCNC: 12.8 G/DL (ref 12–16)
MAGNESIUM SERPL-MCNC: 1.6 MG/DL (ref 1.8–2.4)
MCH RBC QN AUTO: 28.9 PG (ref 26–34)
MCHC RBC AUTO-ENTMCNC: 32.4 G/DL (ref 31–36)
MCV RBC AUTO: 89 FL (ref 80–100)
PERFORMED ON: ABNORMAL
PERFORMED ON: ABNORMAL
PLATELET # BLD AUTO: 348 K/UL (ref 135–450)
PMV BLD AUTO: 7 FL (ref 5–10.5)
POC ACT LR: 274 SEC
POC ACT LR: 358 SEC
POC ACT LR: 382 SEC
POC ACT LR: 396 SEC
POTASSIUM SERPL-SCNC: 4.3 MMOL/L (ref 3.5–5.1)
PROT SERPL-MCNC: 7 G/DL (ref 6.4–8.2)
RBC # BLD AUTO: 4.44 M/UL (ref 4–5.2)
SODIUM SERPL-SCNC: 135 MMOL/L (ref 136–145)
WBC # BLD AUTO: 7.9 K/UL (ref 4–11)

## 2023-11-09 PROCEDURE — 93320 DOPPLER ECHO COMPLETE: CPT

## 2023-11-09 PROCEDURE — 93623 PRGRMD STIMJ&PACG IV RX NFS: CPT | Performed by: INTERNAL MEDICINE

## 2023-11-09 PROCEDURE — 93656 COMPRE EP EVAL ABLTJ ATR FIB: CPT | Performed by: INTERNAL MEDICINE

## 2023-11-09 PROCEDURE — 6360000002 HC RX W HCPCS: Performed by: NURSE ANESTHETIST, CERTIFIED REGISTERED

## 2023-11-09 PROCEDURE — 86901 BLOOD TYPING SEROLOGIC RH(D): CPT

## 2023-11-09 PROCEDURE — 2580000003 HC RX 258

## 2023-11-09 PROCEDURE — 93325 DOPPLER ECHO COLOR FLOW MAPG: CPT

## 2023-11-09 PROCEDURE — 93005 ELECTROCARDIOGRAM TRACING: CPT | Performed by: INTERNAL MEDICINE

## 2023-11-09 PROCEDURE — 2580000003 HC RX 258: Performed by: INTERNAL MEDICINE

## 2023-11-09 PROCEDURE — 85027 COMPLETE CBC AUTOMATED: CPT

## 2023-11-09 PROCEDURE — 83735 ASSAY OF MAGNESIUM: CPT

## 2023-11-09 PROCEDURE — 6370000000 HC RX 637 (ALT 250 FOR IP): Performed by: NURSE PRACTITIONER

## 2023-11-09 PROCEDURE — 7100000011 HC PHASE II RECOVERY - ADDTL 15 MIN: Performed by: ANESTHESIOLOGY

## 2023-11-09 PROCEDURE — C1893 INTRO/SHEATH, FIXED,NON-PEEL: HCPCS

## 2023-11-09 PROCEDURE — C1759 CATH, INTRA ECHOCARDIOGRAPHY: HCPCS

## 2023-11-09 PROCEDURE — G0378 HOSPITAL OBSERVATION PER HR: HCPCS

## 2023-11-09 PROCEDURE — 85347 COAGULATION TIME ACTIVATED: CPT

## 2023-11-09 PROCEDURE — 7100000010 HC PHASE II RECOVERY - FIRST 15 MIN: Performed by: ANESTHESIOLOGY

## 2023-11-09 PROCEDURE — 93312 ECHO TRANSESOPHAGEAL: CPT

## 2023-11-09 PROCEDURE — C1760 CLOSURE DEV, VASC: HCPCS

## 2023-11-09 PROCEDURE — 93623 PRGRMD STIMJ&PACG IV RX NFS: CPT

## 2023-11-09 PROCEDURE — C1769 GUIDE WIRE: HCPCS

## 2023-11-09 PROCEDURE — 3700000001 HC ADD 15 MINUTES (ANESTHESIA): Performed by: ANESTHESIOLOGY

## 2023-11-09 PROCEDURE — 85014 HEMATOCRIT: CPT

## 2023-11-09 PROCEDURE — 2500000003 HC RX 250 WO HCPCS

## 2023-11-09 PROCEDURE — 86850 RBC ANTIBODY SCREEN: CPT

## 2023-11-09 PROCEDURE — 80053 COMPREHEN METABOLIC PANEL: CPT

## 2023-11-09 PROCEDURE — 93622 COMP EP EVAL L VENTR PAC&REC: CPT

## 2023-11-09 PROCEDURE — 93622 COMP EP EVAL L VENTR PAC&REC: CPT | Performed by: INTERNAL MEDICINE

## 2023-11-09 PROCEDURE — 85018 HEMOGLOBIN: CPT

## 2023-11-09 PROCEDURE — 6370000000 HC RX 637 (ALT 250 FOR IP): Performed by: INTERNAL MEDICINE

## 2023-11-09 PROCEDURE — C1766 INTRO/SHEATH,STRBLE,NON-PEEL: HCPCS

## 2023-11-09 PROCEDURE — 3700000000 HC ANESTHESIA ATTENDED CARE: Performed by: ANESTHESIOLOGY

## 2023-11-09 PROCEDURE — 2500000003 HC RX 250 WO HCPCS: Performed by: NURSE ANESTHETIST, CERTIFIED REGISTERED

## 2023-11-09 PROCEDURE — 2580000003 HC RX 258: Performed by: NURSE ANESTHETIST, CERTIFIED REGISTERED

## 2023-11-09 PROCEDURE — 6360000002 HC RX W HCPCS

## 2023-11-09 PROCEDURE — 86900 BLOOD TYPING SEROLOGIC ABO: CPT

## 2023-11-09 PROCEDURE — C1732 CATH, EP, DIAG/ABL, 3D/VECT: HCPCS

## 2023-11-09 PROCEDURE — 93656 COMPRE EP EVAL ABLTJ ATR FIB: CPT

## 2023-11-09 PROCEDURE — 75635 CT ANGIO ABDOMINAL ARTERIES: CPT

## 2023-11-09 PROCEDURE — C1894 INTRO/SHEATH, NON-LASER: HCPCS

## 2023-11-09 PROCEDURE — 6360000004 HC RX CONTRAST MEDICATION: Performed by: NURSE PRACTITIONER

## 2023-11-09 PROCEDURE — 7100000001 HC PACU RECOVERY - ADDTL 15 MIN: Performed by: ANESTHESIOLOGY

## 2023-11-09 PROCEDURE — 36415 COLL VENOUS BLD VENIPUNCTURE: CPT

## 2023-11-09 PROCEDURE — 7100000000 HC PACU RECOVERY - FIRST 15 MIN: Performed by: ANESTHESIOLOGY

## 2023-11-09 PROCEDURE — 93010 ELECTROCARDIOGRAM REPORT: CPT | Performed by: INTERNAL MEDICINE

## 2023-11-09 RX ORDER — LISINOPRIL 20 MG/1
20 TABLET ORAL 2 TIMES DAILY
Status: DISCONTINUED | OUTPATIENT
Start: 2023-11-09 | End: 2023-11-10 | Stop reason: HOSPADM

## 2023-11-09 RX ORDER — AMLODIPINE BESYLATE 5 MG/1
5 TABLET ORAL DAILY
Status: DISCONTINUED | OUTPATIENT
Start: 2023-11-10 | End: 2023-11-10

## 2023-11-09 RX ORDER — FENTANYL CITRATE 50 UG/ML
INJECTION, SOLUTION INTRAMUSCULAR; INTRAVENOUS PRN
Status: DISCONTINUED | OUTPATIENT
Start: 2023-11-09 | End: 2023-11-09 | Stop reason: SDUPTHER

## 2023-11-09 RX ORDER — DILTIAZEM HYDROCHLORIDE 60 MG/1
60 CAPSULE, EXTENDED RELEASE ORAL 2 TIMES DAILY
Status: DISCONTINUED | OUTPATIENT
Start: 2023-11-09 | End: 2023-11-10 | Stop reason: HOSPADM

## 2023-11-09 RX ORDER — PROPOFOL 10 MG/ML
INJECTION, EMULSION INTRAVENOUS PRN
Status: DISCONTINUED | OUTPATIENT
Start: 2023-11-09 | End: 2023-11-09 | Stop reason: SDUPTHER

## 2023-11-09 RX ORDER — ONDANSETRON 2 MG/ML
INJECTION INTRAMUSCULAR; INTRAVENOUS PRN
Status: DISCONTINUED | OUTPATIENT
Start: 2023-11-09 | End: 2023-11-09 | Stop reason: SDUPTHER

## 2023-11-09 RX ORDER — SODIUM CHLORIDE 9 MG/ML
INJECTION, SOLUTION INTRAVENOUS PRN
Status: DISCONTINUED | OUTPATIENT
Start: 2023-11-09 | End: 2023-11-09

## 2023-11-09 RX ORDER — OXYCODONE HYDROCHLORIDE 5 MG/1
10 TABLET ORAL PRN
Status: DISCONTINUED | OUTPATIENT
Start: 2023-11-09 | End: 2023-11-09

## 2023-11-09 RX ORDER — ACETAMINOPHEN 325 MG/1
TABLET ORAL
Status: DISPENSED
Start: 2023-11-09 | End: 2023-11-09

## 2023-11-09 RX ORDER — SODIUM CHLORIDE 0.9 % (FLUSH) 0.9 %
5-40 SYRINGE (ML) INJECTION PRN
Status: DISCONTINUED | OUTPATIENT
Start: 2023-11-09 | End: 2023-11-10 | Stop reason: HOSPADM

## 2023-11-09 RX ORDER — LIDOCAINE HYDROCHLORIDE 20 MG/ML
INJECTION, SOLUTION EPIDURAL; INFILTRATION; INTRACAUDAL; PERINEURAL PRN
Status: DISCONTINUED | OUTPATIENT
Start: 2023-11-09 | End: 2023-11-09 | Stop reason: SDUPTHER

## 2023-11-09 RX ORDER — HYDRALAZINE HYDROCHLORIDE 20 MG/ML
INJECTION INTRAMUSCULAR; INTRAVENOUS PRN
Status: DISCONTINUED | OUTPATIENT
Start: 2023-11-09 | End: 2023-11-09 | Stop reason: SDUPTHER

## 2023-11-09 RX ORDER — SODIUM CHLORIDE 9 MG/ML
INJECTION, SOLUTION INTRAVENOUS PRN
Status: DISCONTINUED | OUTPATIENT
Start: 2023-11-09 | End: 2023-11-10 | Stop reason: HOSPADM

## 2023-11-09 RX ORDER — PANTOPRAZOLE SODIUM 40 MG/1
40 TABLET, DELAYED RELEASE ORAL
Qty: 30 TABLET | Refills: 5 | Status: SHIPPED | OUTPATIENT
Start: 2023-11-09

## 2023-11-09 RX ORDER — OXYCODONE HYDROCHLORIDE 5 MG/1
5 TABLET ORAL PRN
Status: DISCONTINUED | OUTPATIENT
Start: 2023-11-09 | End: 2023-11-09

## 2023-11-09 RX ORDER — HYDROMORPHONE HYDROCHLORIDE 2 MG/ML
0.5 INJECTION, SOLUTION INTRAMUSCULAR; INTRAVENOUS; SUBCUTANEOUS EVERY 5 MIN PRN
Status: DISCONTINUED | OUTPATIENT
Start: 2023-11-09 | End: 2023-11-09

## 2023-11-09 RX ORDER — FENTANYL CITRATE 50 UG/ML
50 INJECTION, SOLUTION INTRAMUSCULAR; INTRAVENOUS EVERY 5 MIN PRN
Status: DISCONTINUED | OUTPATIENT
Start: 2023-11-09 | End: 2023-11-09

## 2023-11-09 RX ORDER — SODIUM CHLORIDE 0.9 % (FLUSH) 0.9 %
5-40 SYRINGE (ML) INJECTION EVERY 12 HOURS SCHEDULED
Status: DISCONTINUED | OUTPATIENT
Start: 2023-11-09 | End: 2023-11-09

## 2023-11-09 RX ORDER — DEXAMETHASONE SODIUM PHOSPHATE 4 MG/ML
INJECTION, SOLUTION INTRA-ARTICULAR; INTRALESIONAL; INTRAMUSCULAR; INTRAVENOUS; SOFT TISSUE PRN
Status: DISCONTINUED | OUTPATIENT
Start: 2023-11-09 | End: 2023-11-09 | Stop reason: SDUPTHER

## 2023-11-09 RX ORDER — ROCURONIUM BROMIDE 10 MG/ML
INJECTION, SOLUTION INTRAVENOUS PRN
Status: DISCONTINUED | OUTPATIENT
Start: 2023-11-09 | End: 2023-11-09 | Stop reason: SDUPTHER

## 2023-11-09 RX ORDER — FERROUS SULFATE 325(65) MG
325 TABLET ORAL
Status: DISCONTINUED | OUTPATIENT
Start: 2023-11-10 | End: 2023-11-10 | Stop reason: HOSPADM

## 2023-11-09 RX ORDER — PRAVASTATIN SODIUM 20 MG
20 TABLET ORAL DAILY
Status: DISCONTINUED | OUTPATIENT
Start: 2023-11-09 | End: 2023-11-10 | Stop reason: HOSPADM

## 2023-11-09 RX ORDER — METOPROLOL TARTRATE 5 MG/5ML
INJECTION INTRAVENOUS PRN
Status: DISCONTINUED | OUTPATIENT
Start: 2023-11-09 | End: 2023-11-09 | Stop reason: SDUPTHER

## 2023-11-09 RX ORDER — PROTAMINE SULFATE 10 MG/ML
INJECTION, SOLUTION INTRAVENOUS PRN
Status: DISCONTINUED | OUTPATIENT
Start: 2023-11-09 | End: 2023-11-09 | Stop reason: SDUPTHER

## 2023-11-09 RX ORDER — ONDANSETRON 2 MG/ML
4 INJECTION INTRAMUSCULAR; INTRAVENOUS
Status: DISCONTINUED | OUTPATIENT
Start: 2023-11-09 | End: 2023-11-09

## 2023-11-09 RX ORDER — SODIUM CHLORIDE 0.9 % (FLUSH) 0.9 %
5-40 SYRINGE (ML) INJECTION EVERY 12 HOURS SCHEDULED
Status: DISCONTINUED | OUTPATIENT
Start: 2023-11-09 | End: 2023-11-10 | Stop reason: HOSPADM

## 2023-11-09 RX ORDER — METOPROLOL TARTRATE 50 MG/1
50 TABLET, FILM COATED ORAL 3 TIMES DAILY
Status: DISCONTINUED | OUTPATIENT
Start: 2023-11-09 | End: 2023-11-10 | Stop reason: HOSPADM

## 2023-11-09 RX ORDER — SODIUM CHLORIDE 9 MG/ML
INJECTION, SOLUTION INTRAVENOUS CONTINUOUS PRN
Status: DISCONTINUED | OUTPATIENT
Start: 2023-11-09 | End: 2023-11-09 | Stop reason: SDUPTHER

## 2023-11-09 RX ORDER — HEPARIN SODIUM 1000 [USP'U]/ML
INJECTION, SOLUTION INTRAVENOUS; SUBCUTANEOUS PRN
Status: DISCONTINUED | OUTPATIENT
Start: 2023-11-09 | End: 2023-11-09 | Stop reason: SDUPTHER

## 2023-11-09 RX ORDER — SODIUM CHLORIDE 0.9 % (FLUSH) 0.9 %
5-40 SYRINGE (ML) INJECTION PRN
Status: DISCONTINUED | OUTPATIENT
Start: 2023-11-09 | End: 2023-11-09

## 2023-11-09 RX ORDER — ASPIRIN 81 MG/1
81 TABLET ORAL DAILY
Status: DISCONTINUED | OUTPATIENT
Start: 2023-11-10 | End: 2023-11-10 | Stop reason: HOSPADM

## 2023-11-09 RX ORDER — ACETAMINOPHEN 325 MG/1
650 TABLET ORAL EVERY 4 HOURS PRN
Status: DISCONTINUED | OUTPATIENT
Start: 2023-11-09 | End: 2023-11-10 | Stop reason: HOSPADM

## 2023-11-09 RX ORDER — ACETAMINOPHEN 325 MG/1
650 TABLET ORAL EVERY 4 HOURS PRN
Status: DISCONTINUED | OUTPATIENT
Start: 2023-11-09 | End: 2023-11-09

## 2023-11-09 RX ORDER — LANOLIN ALCOHOL/MO/W.PET/CERES
400 CREAM (GRAM) TOPICAL DAILY
Status: DISCONTINUED | OUTPATIENT
Start: 2023-11-09 | End: 2023-11-10 | Stop reason: HOSPADM

## 2023-11-09 RX ORDER — PANTOPRAZOLE SODIUM 40 MG/1
40 TABLET, DELAYED RELEASE ORAL
Status: DISCONTINUED | OUTPATIENT
Start: 2023-11-10 | End: 2023-11-10 | Stop reason: HOSPADM

## 2023-11-09 RX ADMIN — SUGAMMADEX 200 MG: 100 INJECTION, SOLUTION INTRAVENOUS at 10:04

## 2023-11-09 RX ADMIN — FENTANYL CITRATE 50 MCG: 50 INJECTION, SOLUTION INTRAMUSCULAR; INTRAVENOUS at 07:56

## 2023-11-09 RX ADMIN — PHENYLEPHRINE HYDROCHLORIDE 200 MCG: 10 INJECTION INTRAVENOUS at 09:01

## 2023-11-09 RX ADMIN — ISOPROTERENOL HYDROCHLORIDE 5 MCG/MIN: 0.2 INJECTION, SOLUTION INTRAMUSCULAR; INTRAVENOUS at 09:52

## 2023-11-09 RX ADMIN — IOPAMIDOL 75 ML: 755 INJECTION, SOLUTION INTRAVENOUS at 16:58

## 2023-11-09 RX ADMIN — ROCURONIUM BROMIDE 50 MG: 10 INJECTION, SOLUTION INTRAVENOUS at 08:08

## 2023-11-09 RX ADMIN — PHENYLEPHRINE HYDROCHLORIDE 200 MCG: 10 INJECTION INTRAVENOUS at 10:27

## 2023-11-09 RX ADMIN — DEXAMETHASONE SODIUM PHOSPHATE 8 MG: 4 INJECTION, SOLUTION INTRAMUSCULAR; INTRAVENOUS at 08:12

## 2023-11-09 RX ADMIN — PHENYLEPHRINE HYDROCHLORIDE 200 MCG: 10 INJECTION INTRAVENOUS at 09:22

## 2023-11-09 RX ADMIN — LIDOCAINE HYDROCHLORIDE 100 MG: 20 INJECTION, SOLUTION EPIDURAL; INFILTRATION; INTRACAUDAL; PERINEURAL at 08:08

## 2023-11-09 RX ADMIN — HYDRALAZINE HYDROCHLORIDE 5 MG: 20 INJECTION INTRAMUSCULAR; INTRAVENOUS at 08:45

## 2023-11-09 RX ADMIN — HEPARIN SODIUM 2000 UNITS: 1000 INJECTION INTRAVENOUS; SUBCUTANEOUS at 09:30

## 2023-11-09 RX ADMIN — Medication 400 MG: at 20:21

## 2023-11-09 RX ADMIN — ACETAMINOPHEN 650 MG: 325 TABLET ORAL at 19:06

## 2023-11-09 RX ADMIN — PHENYLEPHRINE HYDROCHLORIDE 200 MCG: 10 INJECTION INTRAVENOUS at 08:58

## 2023-11-09 RX ADMIN — METFORMIN HYDROCHLORIDE 850 MG: 850 TABLET ORAL at 20:21

## 2023-11-09 RX ADMIN — HYDRALAZINE HYDROCHLORIDE 5 MG: 20 INJECTION INTRAMUSCULAR; INTRAVENOUS at 08:36

## 2023-11-09 RX ADMIN — PHENYLEPHRINE HYDROCHLORIDE 200 MCG: 10 INJECTION INTRAVENOUS at 09:10

## 2023-11-09 RX ADMIN — PHENYLEPHRINE HYDROCHLORIDE 100 MCG: 10 INJECTION INTRAVENOUS at 09:49

## 2023-11-09 RX ADMIN — METOPROLOL TARTRATE 5 MG: 5 INJECTION INTRAVENOUS at 08:18

## 2023-11-09 RX ADMIN — PROPOFOL 50 MG: 10 INJECTION, EMULSION INTRAVENOUS at 09:32

## 2023-11-09 RX ADMIN — PHENYLEPHRINE HYDROCHLORIDE 25 MCG/MIN: 10 INJECTION INTRAVENOUS at 09:52

## 2023-11-09 RX ADMIN — HEPARIN SODIUM 5000 UNITS: 1000 INJECTION INTRAVENOUS; SUBCUTANEOUS at 09:12

## 2023-11-09 RX ADMIN — PROTAMINE SULFATE 30 MG: 10 INJECTION, SOLUTION INTRAVENOUS at 10:00

## 2023-11-09 RX ADMIN — FENTANYL CITRATE 50 MCG: 50 INJECTION, SOLUTION INTRAMUSCULAR; INTRAVENOUS at 08:08

## 2023-11-09 RX ADMIN — SODIUM CHLORIDE, PRESERVATIVE FREE 10 ML: 5 INJECTION INTRAVENOUS at 20:23

## 2023-11-09 RX ADMIN — HEPARIN SODIUM 7000 UNITS: 1000 INJECTION INTRAVENOUS; SUBCUTANEOUS at 08:38

## 2023-11-09 RX ADMIN — PROPOFOL 100 MG: 10 INJECTION, EMULSION INTRAVENOUS at 08:08

## 2023-11-09 RX ADMIN — SODIUM CHLORIDE: 9 INJECTION, SOLUTION INTRAVENOUS at 07:47

## 2023-11-09 RX ADMIN — METOPROLOL TARTRATE 50 MG: 50 TABLET ORAL at 20:21

## 2023-11-09 RX ADMIN — PRAVASTATIN SODIUM 20 MG: 20 TABLET ORAL at 20:20

## 2023-11-09 RX ADMIN — PHENYLEPHRINE HYDROCHLORIDE 200 MCG: 10 INJECTION INTRAVENOUS at 09:42

## 2023-11-09 RX ADMIN — ACETAMINOPHEN 650 MG: 325 TABLET ORAL at 11:09

## 2023-11-09 RX ADMIN — PHENYLEPHRINE HYDROCHLORIDE 200 MCG: 10 INJECTION INTRAVENOUS at 09:25

## 2023-11-09 RX ADMIN — PROPOFOL 50 MG: 10 INJECTION, EMULSION INTRAVENOUS at 08:53

## 2023-11-09 RX ADMIN — DILTIAZEM HYDROCHLORIDE 60 MG: 60 CAPSULE, EXTENDED RELEASE ORAL at 20:25

## 2023-11-09 RX ADMIN — ONDANSETRON 4 MG: 2 INJECTION INTRAMUSCULAR; INTRAVENOUS at 09:56

## 2023-11-09 RX ADMIN — PHENYLEPHRINE HYDROCHLORIDE 300 MCG: 10 INJECTION INTRAVENOUS at 09:52

## 2023-11-09 ASSESSMENT — PAIN - FUNCTIONAL ASSESSMENT: PAIN_FUNCTIONAL_ASSESSMENT: PREVENTS OR INTERFERES SOME ACTIVE ACTIVITIES AND ADLS

## 2023-11-09 ASSESSMENT — LIFESTYLE VARIABLES: SMOKING_STATUS: 1

## 2023-11-09 ASSESSMENT — PAIN SCALES - GENERAL
PAINLEVEL_OUTOF10: 2
PAINLEVEL_OUTOF10: 0
PAINLEVEL_OUTOF10: 8
PAINLEVEL_OUTOF10: 0
PAINLEVEL_OUTOF10: 6

## 2023-11-09 ASSESSMENT — PAIN DESCRIPTION - ORIENTATION
ORIENTATION: RIGHT;LEFT;MID
ORIENTATION: LOWER
ORIENTATION: LOWER

## 2023-11-09 ASSESSMENT — PAIN DESCRIPTION - LOCATION
LOCATION: BACK

## 2023-11-09 ASSESSMENT — PAIN DESCRIPTION - DESCRIPTORS
DESCRIPTORS: ACHING
DESCRIPTORS: DISCOMFORT
DESCRIPTORS: DISCOMFORT

## 2023-11-09 NOTE — PROGRESS NOTES
Pt up to the floor from procedure. VSS, right groin hard with hematoma. Pressure held for 15 mins, site is now soft. No bleeding noted. Extremity warm to touch with strong pulses. No tingling or numbness noted. Pt denies any needs. Call light in reach and bed alarm engaged.

## 2023-11-09 NOTE — PROCEDURES
coronary sinus under fluoroscopy for recording and mapping of the left atrium. Using ICE we delineated the left pulmonary vein, left atrial appendage,  mitral valve, and right superior and right inferior pulmonary veins. Prior to full heparinization, ICE images did not reveal any significant pericardial effusion. Patient received a bolus of heparin prior transseptal puncture followed by additional heparin bolus / heparin drip with continuous monitoring of the ACT during the procedure to keep the ACT more than 350 seconds. Transseptal punctures through intact septum were done using ICE, and pressure monitoring and using VersaCross system. Using [address] and Carto navigation system a three dimensional electro anatomical mapping of the left atrium, in addition to right and left sided pulmonary vein was created. Using Octaray catheter voltage mapping of the left atrium was created. An esophageal temperature probe in addition to Esophastar catheter was advanced into the esophagus for mapping of the esophagus and careful monitoring of the esophageal temperature during ablation. Ablation stopped if the temperature was rising for more than ~ 0.5 C. All pulmonary veins had PV fascicles, the triggers for the atrial fibrillation. Then wide area circumferential ablation for right and left sided pulmonary veins were performed. Linear RF lesions was placed around both superior and inferior pulmonary vein in right and left side well outside the pulmonary vein ostium till all four pulmonary veins were isolated. On the posterior wall careful monitoring of esophageal temperature was done to prevent injury to esophagus. On the right side before ablating the anterior wall high amp pacing was performed to check and locate phrenic capture and avoid injury during ablation. Isuprel IV was started to check for induction of any other arrhythmia.  Burst pacing up to 250 ms and programmed stimulation did not induce any sustained arrhythmia. Pacing with high output over the ablation lines, antrum of pulmonary veins were performed and areas that showed atrial capture were further ablated till no further capture noted. Exit block was checked with high amp pacing inside and over the ablation lines. Both entrance and exit block were confirmed and pacing maneuvers. After ablation EP study and programmed stimulation was performed to rule out any other arrhythmia. The ablation catheter were moved from the left atrium to the left ventricular apex and His bundle position. His bundle potentials was recorded and pacing and recordings were performed from right atrium, coronary sinus and LV apex with the following results:     AH interval was 70 ms  HV interval was 40 ms  Pacing from right atrium, 1:1 conduction over AV node with (AV wenckebach) was 410 ms  Pacing from atrium, AV darshan ERP was 600/350 ms   Atrial ERP was   Pacing from LV apex, 1:1 retrograde conduction over AV node (VA wenckebach) was 360 ms    Procedure was performed with intracardiac ultrasound and 3D mapping system without using fluoroscopy. At the end of the procedure, using ICE we confirmed the lack of any pericardial effusion. Protamine was given to reverse the heapin. Sheaths were removed using and Perclose device was used for closure of the access sites. The patient tolerated the procedure well and there were no complications. Patient was extubated and transferred to the floor in stable condition. EBL less than 20 ml. Conclusion:     - Pulmonary vein isolations using wide area circumferential radiofrequency ablation     Plan:   The patient will be observed, receives usual post ablation care and discharged if remains stable. Resume OAC, and PPI for 30 days.      Noreen Kirby MD, MPH  [facility]   Office: [phone]

## 2023-11-09 NOTE — H&P
[address]   Electrophysiology   Date: 11/9/2023  CC: Irregular heart rhythm    HPI: Lazarus Mares is a 72 y.o. female who presented to the hospital with feeling irregular heart beats and mild shortness of breath. She was recently admitted with chest discomfort and seen by general cardiology. Stress testing and echo was done which were both unremarkable. Stress test with no ischemia and echo with normal EF. She has history of symptomatic paroxysmal atrial fibrillation. She has been seen in office and antiarrhythmic therapy and ablation have been discussed. She was not interested in ablation at the time. Complex PMH for CAD s/p CABG in 1993, DM, HTN, HLD, jorge l bypass with weight loss and PAF. She also has had Atrial flutter. S/p ILR in 2/17/2022. She has converted to sinus rhythm. Assessment:   PAF, symptomatic  History of atrial flutter   CAD s/p CABG  DM  HTN  HLD    Plan:   High risk [id number] score. Anticoagulation is recommended. Eliquis 5 mg bid. She is bradycardic at baseline and antiarrhythmic therapy is limited. I have discussed amiodarone therapy with her again with risks and benefits and she is not interested due to risk of side effects. Symptomatic paroxysmal atrial fibrillation    We discussed progressive nature of atrial fibrillation. Treatment success decreases when AF becomes persistent and last longer. Ablation was discussed and future possible need for repeat procedure.     Risks associated with ablation include but not limited to allergic reaction to the medications, pain, bleeding, infection, nerve injury, injury to diaphragm(breathing muscle), pulmonary embolus(blood clot in lungs), deep vein blood clot, pneumothorax, hemothorax, acute renal failure, cardiac perforation,  tamponade, need for emergent surgery (open heart), permanent pacemaker, pulmonary vein stenosis, left atrial to esophageal fistula, stroke, myocardial infarction and (MAG-OX) 400 (240 Mg) MG tablet Take 1 tablet by mouth daily 4/12/23   Angela Garner DO   apixaban (ELIQUIS) 5 MG TABS tablet Take 1 tablet by mouth 2 times daily 3/31/19   Carlos Ramirez MD   famotidine (PEPCID) 20 MG tablet Take 1 tablet by mouth 2 times daily 3/31/19   Yael ADAME MD   ferrous sulfate 325 (65 FE) MG tablet Take 1 tablet by mouth daily (with breakfast)    Garrick Boston MD   calcium carbonate (OSCAL) 500 MG TABS tablet Take 600 mg by mouth 2 times daily. Garrick Boston MD   metformin (GLUCOPHAGE) 850 MG tablet Take 1 tablet by mouth 2 times daily (with meals)    Garrick Boston MD   Multiple Vitamin (MULTIVITAMIN PO) Take 1 tablet by mouth daily. Garrick Boston MD   vitamin B-12 (CYANOCOBALAMIN) 1000 MCG tablet Take 0.5 tablets by mouth daily    ProviderGarrick MD     Past Medical History:   Diagnosis Date    Acute CVA (cerebrovascular accident) ([address] [facility]) 12/11/2021    Arthritis     CAD (coronary artery disease)     Diabetes mellitus ([facility])     Hyperlipidemia     Hypertension      Past Surgical History:   Procedure Laterality Date    CARDIAC SURGERY  2001    stent    CHOLECYSTECTOMY      in 2007    [address] [facility]      gastric bypass    GASTRIC BYPASS SURGERY  2004    TOTAL HIP ARTHROPLASTY Left 03/27/2023    LEFT HIP TOTAL ARTHROPLASTY ANTERIOR APPROACH performed by Fanny Matamoros MD at [facility]   Allergen Reactions    Codeine Rash    Demerol Rash       Documentation and Exam:   I have personally completed a history, physical exam & review of systems for this patient (see notes).     Sedation will be provided by anesthesia team.     Electronically signed by Chasity Avila MD on 11/9/2023 at 7:26 AM

## 2023-11-09 NOTE — DISCHARGE INSTRUCTIONS
Ablation Discharge Instructions    Care of your puncture site:  Remove bandage 24 hours after the procedure. May shower in 24 hours but do not sit in a bathtub/pool of water for 5 days or until the wound is healed. Inspect the site daily and gently clean using soap and water while standing in the shower. Dry thoroughly and apply a Band-Aid that covers the entire site. Do not apply powder or lotion. Normal Observations:  Soreness or tenderness which may last one week. Mild oozing from the incision site. Possible bruising that could last 2 weeks. Activity:  You may resume driving 24 hours following the procedure. You may resume normal activity in 5 days or after the wound heals. Avoid lifting more than 10 pounds for 5 days or until the wound heals. Avoid strenuous exercise or activity for 1 week. Nutrition:  Regular diet   Drink at least 8 to 10 glasses of decaffeinated, non-alcoholic fluid for the next 24 hours to flush the x-ray dye used for your angiogram out of your body. Call your doctor immediately if your condition worsens, for any other concerns, for a follow-up appointment or if you experience any of the following:  Significant bleeding that does not stop after 10 minutes of applying firm pressure on the puncture site. Increased swelling on the groin or leg. Unusual pain, numbness, or tingling of the groin or down the leg. Any signs of infection such as: redness, yellow drainage at the site, swelling or pain. ANESTHESIA DISCHARGE INSTRUCTIONS    Wear your seatbelt home. You are under the influence of drugs-do not drink alcohol, drive, operate machinery, make any important decisions or sign any legal documents for 24 hours. Children should not ride bikes, [place] or play on gym sets for 24 hours after surgery. A responsible adult needs to be with you for 24 hours. You may experience lightheadedness, dizziness, or sleepiness following surgery.   Rest at home today- Instructions for Use provided with your medicine. Ask your doctor or pharmacist if you do not understand these instructions. Use this medicine for the full prescribed length of time, even if your symptoms quickly improve. Call your doctor if your symptoms do not improve or if they get worse while you are using this medicine. This medicine can affect the results of certain medical tests. Tell any doctor who treats you that you are using pantoprazole. Pantoprazole may also affect a drug-screening urine test and you may have false results. Tell the laboratory staff that you use this medicine. Store this medicine at room temperature away from moisture, heat, and light. What happens if I miss a dose? Use the medicine as soon as you can, but skip the missed dose if it is almost time for your next dose. Do not use two doses at one time. What happens if I overdose? Seek emergency medical attention or call the Poison Help line at 1-892.322.1591. What should I avoid while using pantoprazole? This medicine can cause diarrhea, which may be a sign of a new infection. If you have diarrhea that is watery or bloody, call your doctor. Do not use anti-diarrhea medicine unless your doctor tells you to. What are the possible side effects of pantoprazole? Get emergency medical help if you have signs of an allergic reaction: hives; difficulty breathing; swelling of your face, lips, tongue, or throat.   Call your doctor at once if you have:  severe stomach pain, diarrhea that is watery or bloody;  sudden pain or trouble moving your hip, wrist, or back;  bruising or swelling where intravenous pantoprazole was injected;  kidney problems -- fever, rash, nausea, loss of appetite, joint pain, urinating less than usual, blood in your urine, weight gain;  low magnesium --dizziness, fast or irregular heart rate, tremors (shaking) or jerking muscle movements, feeling jittery, muscle cramps, muscle spasms in your hands and feet, cough

## 2023-11-09 NOTE — PROGRESS NOTES
PRE-PROCEDURE    DATE: 11/9/2023 ARRIVAL TO CATH LAB: 7:01 AM    ADMIT SOURCE: Outpatient    ID & ALLERGY BAND: On    CONSENT: Yes    NPO SINCE: Midnight    LABS/PREGNANCY TEST: N/A    PULSES: Right DP 3+  Right PT 1+  Left DP 3+  Left PT 1+ and Present with Doppler    IV SITE : Started in left arm.  with fluids infusing at kvo 7:01 AM     EKG RHYTHM: Atrial Fibrillation

## 2023-11-09 NOTE — PROGRESS NOTES
After further discussion with the patient and brother Phoebe Cartagena, patient does not have anyone to stay with them after discharge/at home. Plan for patient to be admitted to the hospital due to receiving general anesthesia today for afib ablation.

## 2023-11-09 NOTE — ANESTHESIA PRE PROCEDURE
Applicable): No results found for: \"COVID19\"        Anesthesia Evaluation  Patient summary reviewed  Airway: Mallampati: I  TM distance: >3 FB   Neck ROM: full  Mouth opening: > = 3 FB   Dental:    (+) poor dentition      Pulmonary:normal exam    (+) current smoker          Patient smoked on day of surgery. Cardiovascular:    (+) hypertension:, CAD:, CABG/stent:, dysrhythmias: atrial fibrillation,     (-)  angina      Rhythm: regular  Rate: normal  Echocardiogram reviewed               ROS comment: Findings      Left Ventricle   Left ventricular systolic function is hyperdynamic with ejection fraction   estimated at 65-70 %. No regional wall motion abnormalities are noted. Normal left ventricular wall thickness. Left ventricle size is normal.      Mitral Valve   Mitral valve leaflets appear mildly thickened. Trivial mitral regurgitation. Left Atrium   WNL      Aortic Valve   The aortic valve appears tricuspid . Aortic valve leaflets appear thickened. Trivial aortic regurgitation is present. Aorta   The aortic root is normal in size. Right Ventricle   Normal RV size and function. Tricuspid Valve   Tricuspid valve is structurally normal.   Trivial tricuspid regurgitation. Inadequate tricuspid regurgitation to estimate systolic pulmonary artery   pressure. Right Atrium   WNL      Pulmonic Valve   The pulmonic valve is normal in structure. Trivial pulmonic regurgitation     Neuro/Psych:   (+) CVA:,             GI/Hepatic/Renal:   (+) GERD:,      (-) liver disease and no renal disease       Endo/Other:    (+) DiabetesType II DM, , .                 Abdominal:             Vascular: Other Findings:           Anesthesia Plan      general     ASA 3       Induction: intravenous. MIPS: Postoperative opioids intended and Prophylactic antiemetics administered. Anesthetic plan and risks discussed with patient.     Use of blood products discussed with patient

## 2023-11-09 NOTE — PROGRESS NOTES
Phase 1 complete, pt seen by anesthesiologist. VSS, pt resting comfortably. R groin site is CDI, no hematoma noted. R pedal pulse is palpable. Pt remains in NSR. Will transition to phase 2 for d/c.

## 2023-11-09 NOTE — PROGRESS NOTES
Pt back from ct scan. Groin site unchanged. VSS and pt denies any needs. Call light in reach and bed alarm engaged.

## 2023-11-09 NOTE — PROGRESS NOTES
Patient out of bed to ambulate around the unit with walker, no change in R groin site post ambulation.

## 2023-11-09 NOTE — PROGRESS NOTES
Pressure held to right groin site x3. VSS. Peripheral and fem pulses palpable. Pt denies any numbness or significant pain. Swelling and bruising noted to right groin site extending towards hip. Primary RN placed call to cardiology. They will come to bedside.

## 2023-11-09 NOTE — FLOWSHEET NOTE
Pt placed in a semi-fowlers position. R groin site is CDI. R pedal pulse is palpable. Pt remains in a NSR.

## 2023-11-09 NOTE — PROGRESS NOTES
Patient/report received from [facility] PACU RN, MARILYN, R groin site drsg CDI with no drainage, pulses palpable, denying numbness tingling, stating pain is improved to 3/10 since repositioning and PO tylenol, hopson in place, resting comfortably in cart, call light in reach, bedrest until 1230

## 2023-11-09 NOTE — PROGRESS NOTES
Pt post ablation. Pt has frequent cough. She has pain with palpation above her groin site with significant bruising. Her vitals are stable. Pt appears to have right groin hematoma. Check STAT H/H, as well as abdominal CT with runoff to ensure no significant hematoma/pseudoaneurysm. Monse Garcia at bedside and agrees with above.      Julia Navarro, APRN-CNP

## 2023-11-09 NOTE — ANESTHESIA POSTPROCEDURE EVALUATION
Department of Anesthesiology  Postprocedure Note    Patient: Eneida Mcneill  MRN: [id number]  YOB: 1950  Date of evaluation: 11/9/2023      Procedure Summary     Date: 11/09/23 Room / Location: [facility] Cath Lab; [facility] Echocardiography    Anesthesia Start: 1072 Anesthesia Stop: 1031    Procedure: ECHO/ABLATION WITH ANESTHESIA Diagnosis: Other persistent atrial fibrillation    Scheduled Providers: Angel Montano MD Responsible Provider: Angel Montano MD    Anesthesia Type: general ASA Status: 3          Anesthesia Type: No value filed.     El Phase I: El Score: 9    El Phase II:        Anesthesia Post Evaluation    Patient location during evaluation: PACU  Patient participation: complete - patient participated  Level of consciousness: awake and alert  Pain score: 1  Airway patency: patent  Nausea & Vomiting: no nausea  Complications: no  Cardiovascular status: blood pressure returned to baseline  Respiratory status: acceptable  Hydration status: euvolemic  Multimodal analgesia pain management approach  Pain management: adequate

## 2023-11-10 VITALS
OXYGEN SATURATION: 99 % | TEMPERATURE: 97.4 F | BODY MASS INDEX: 32.28 KG/M2 | HEART RATE: 68 BPM | WEIGHT: 165.3 LBS | DIASTOLIC BLOOD PRESSURE: 73 MMHG | RESPIRATION RATE: 18 BRPM | SYSTOLIC BLOOD PRESSURE: 151 MMHG

## 2023-11-10 LAB
ANION GAP SERPL CALCULATED.3IONS-SCNC: 12 MMOL/L (ref 3–16)
BUN SERPL-MCNC: 14 MG/DL (ref 7–20)
CALCIUM SERPL-MCNC: 8.7 MG/DL (ref 8.3–10.6)
CHLORIDE SERPL-SCNC: 99 MMOL/L (ref 99–110)
CO2 SERPL-SCNC: 22 MMOL/L (ref 21–32)
CREAT SERPL-MCNC: 0.6 MG/DL (ref 0.6–1.2)
DEPRECATED RDW RBC AUTO: 13.2 % (ref 12.4–15.4)
GFR SERPLBLD CREATININE-BSD FMLA CKD-EPI: >60 ML/MIN/{1.73_M2}
GLUCOSE SERPL-MCNC: 143 MG/DL (ref 70–99)
HCT VFR BLD AUTO: 31.2 % (ref 36–48)
HGB BLD-MCNC: 10.3 G/DL (ref 12–16)
MCH RBC QN AUTO: 28.9 PG (ref 26–34)
MCHC RBC AUTO-ENTMCNC: 33 G/DL (ref 31–36)
MCV RBC AUTO: 87.7 FL (ref 80–100)
PLATELET # BLD AUTO: 301 K/UL (ref 135–450)
PMV BLD AUTO: 7.5 FL (ref 5–10.5)
POTASSIUM SERPL-SCNC: 4.6 MMOL/L (ref 3.5–5.1)
RBC # BLD AUTO: 3.56 M/UL (ref 4–5.2)
SODIUM SERPL-SCNC: 133 MMOL/L (ref 136–145)
WBC # BLD AUTO: 11.3 K/UL (ref 4–11)

## 2023-11-10 PROCEDURE — 36415 COLL VENOUS BLD VENIPUNCTURE: CPT

## 2023-11-10 PROCEDURE — G0378 HOSPITAL OBSERVATION PER HR: HCPCS

## 2023-11-10 PROCEDURE — 85027 COMPLETE CBC AUTOMATED: CPT

## 2023-11-10 PROCEDURE — 2580000003 HC RX 258: Performed by: NURSE PRACTITIONER

## 2023-11-10 PROCEDURE — 99214 OFFICE O/P EST MOD 30 MIN: CPT | Performed by: NURSE PRACTITIONER

## 2023-11-10 PROCEDURE — 6370000000 HC RX 637 (ALT 250 FOR IP): Performed by: NURSE PRACTITIONER

## 2023-11-10 PROCEDURE — 80048 BASIC METABOLIC PNL TOTAL CA: CPT

## 2023-11-10 RX ORDER — METOPROLOL TARTRATE 100 MG/1
50 TABLET ORAL 3 TIMES DAILY
Qty: 90 TABLET | Refills: 0 | Status: SHIPPED
Start: 2023-11-10

## 2023-11-10 RX ADMIN — PRAVASTATIN SODIUM 20 MG: 20 TABLET ORAL at 10:18

## 2023-11-10 RX ADMIN — ACETAMINOPHEN 650 MG: 325 TABLET ORAL at 08:18

## 2023-11-10 RX ADMIN — FERROUS SULFATE TAB 325 MG (65 MG ELEMENTAL FE) 325 MG: 325 (65 FE) TAB at 09:45

## 2023-11-10 RX ADMIN — LISINOPRIL 20 MG: 20 TABLET ORAL at 10:17

## 2023-11-10 RX ADMIN — ASPIRIN 81 MG: 81 TABLET, COATED ORAL at 09:45

## 2023-11-10 RX ADMIN — PANTOPRAZOLE SODIUM 40 MG: 40 TABLET, DELAYED RELEASE ORAL at 06:33

## 2023-11-10 RX ADMIN — Medication 10 ML: at 09:45

## 2023-11-10 RX ADMIN — METOPROLOL TARTRATE 50 MG: 50 TABLET ORAL at 09:45

## 2023-11-10 RX ADMIN — DILTIAZEM HYDROCHLORIDE 60 MG: 60 CAPSULE, EXTENDED RELEASE ORAL at 09:45

## 2023-11-10 RX ADMIN — METOPROLOL TARTRATE 50 MG: 50 TABLET ORAL at 13:14

## 2023-11-10 RX ADMIN — Medication 400 MG: at 09:45

## 2023-11-10 ASSESSMENT — PAIN SCALES - GENERAL
PAINLEVEL_OUTOF10: 0
PAINLEVEL_OUTOF10: 3
PAINLEVEL_OUTOF10: 0
PAINLEVEL_OUTOF10: 3

## 2023-11-10 ASSESSMENT — PAIN DESCRIPTION - LOCATION
LOCATION: BACK
LOCATION: BACK

## 2023-11-10 NOTE — PROGRESS NOTES
Data- discharge order received, pt verbalized agreement to discharge, disposition to previous residence, no needs for HHC/DME. Action- discharge instructions prepared and given to pt, pt verbalized understanding. Medication information packet given r/t NEW and/or CHANGED prescriptions emphasizing name/purpose/side effects, pt verbalized understanding. Discharge instruction summary: Diet- general, Activity- as peterson, Primary Care Physician as followsDenisa Negrete [phone] f/u appointment pt will make f.u, immunizations reviewed , prescription medications filled e-prescribed to pts pharm. Inpatient surgical procedure precautions reviewed: yes CHF Education reviewed. Pt/ Family has had a total of 60 minutes CHF education this admission encounter. 1. WEIGHT: Admit Weight - Scale: 72.6 kg (160 lb) (11/09/23 0704)        Today  Weight - Scale: 75 kg (165 lb 4.8 oz) (11/10/23 0420)       2. O2 SAT.: SpO2: 99 % (11/10/23 0920)    Response- Pt belongings gathered, IV removed. Disposition is home (no HHC/DME needs), transported with friend, taken to lobby via w/c w/ this RN, no complications.

## 2023-11-10 NOTE — PROGRESS NOTES
Radiology called with critical results from pt's CT scan. Will only release orders to a Physician or an NP. Cardiology paged and updated on pt status and given radiology's contact information. Pt's R femoral site is bruised but soft to the touch with no signs of bleeding or hematoma.      Pt's vitals are    HR 94  /57  O2 92% on room air    Pt denies any pain at this time

## 2023-11-16 PROCEDURE — 93298 REM INTERROG DEV EVAL SCRMS: CPT | Performed by: INTERNAL MEDICINE

## 2023-11-16 PROCEDURE — G2066 INTER DEVC REMOTE 30D: HCPCS | Performed by: INTERNAL MEDICINE

## 2023-11-17 ENCOUNTER — HOSPITAL ENCOUNTER (INPATIENT)
Age: 73
LOS: 2 days | Discharge: HOME | DRG: 690 | End: 2023-11-19
Attending: INTERNAL MEDICINE | Admitting: INTERNAL MEDICINE
Payer: MEDICARE

## 2023-11-17 ENCOUNTER — APPOINTMENT (OUTPATIENT)
Dept: GENERAL RADIOLOGY | Age: 73
DRG: 690 | End: 2023-11-17
Payer: MEDICARE

## 2023-11-17 DIAGNOSIS — N30.00 ACUTE CYSTITIS WITHOUT HEMATURIA: ICD-10-CM

## 2023-11-17 DIAGNOSIS — R79.89 ELEVATED TROPONIN: ICD-10-CM

## 2023-11-17 DIAGNOSIS — R42 LIGHTHEADEDNESS: Primary | ICD-10-CM

## 2023-11-17 PROBLEM — R55 PRE-SYNCOPE: Status: ACTIVE | Noted: 2023-11-17

## 2023-11-17 LAB
ALBUMIN SERPL-MCNC: 4.5 G/DL (ref 3.4–5)
ALBUMIN/GLOB SERPL: 1.7 {RATIO} (ref 1.1–2.2)
ALP SERPL-CCNC: 115 U/L (ref 40–129)
ALT SERPL-CCNC: 8 U/L (ref 10–40)
ANION GAP SERPL CALCULATED.3IONS-SCNC: 14 MMOL/L (ref 3–16)
APTT BLD: 32.5 SEC (ref 22.7–35.9)
AST SERPL-CCNC: 15 U/L (ref 15–37)
BACTERIA URNS QL MICRO: ABNORMAL /HPF
BASOPHILS # BLD: 0 K/UL (ref 0–0.2)
BASOPHILS NFR BLD: 0.5 %
BILIRUB SERPL-MCNC: 0.3 MG/DL (ref 0–1)
BILIRUB UR QL STRIP.AUTO: NEGATIVE
BUN SERPL-MCNC: 12 MG/DL (ref 7–20)
CALCIUM SERPL-MCNC: 9.3 MG/DL (ref 8.3–10.6)
CHLORIDE SERPL-SCNC: 96 MMOL/L (ref 99–110)
CLARITY UR: ABNORMAL
CO2 SERPL-SCNC: 24 MMOL/L (ref 21–32)
COLOR UR: YELLOW
CREAT SERPL-MCNC: 0.6 MG/DL (ref 0.6–1.2)
DEPRECATED RDW RBC AUTO: 13.4 % (ref 12.4–15.4)
EKG ATRIAL RATE: 84 BPM
EKG DIAGNOSIS: NORMAL
EKG P AXIS: 89 DEGREES
EKG P-R INTERVAL: 164 MS
EKG Q-T INTERVAL: 368 MS
EKG QRS DURATION: 70 MS
EKG QTC CALCULATION (BAZETT): 434 MS
EKG R AXIS: -7 DEGREES
EKG T AXIS: 40 DEGREES
EKG VENTRICULAR RATE: 84 BPM
EOSINOPHIL # BLD: 0.1 K/UL (ref 0–0.6)
EOSINOPHIL NFR BLD: 0.6 %
EPI CELLS #/AREA URNS AUTO: 1 /HPF (ref 0–5)
GFR SERPLBLD CREATININE-BSD FMLA CKD-EPI: >60 ML/MIN/{1.73_M2}
GLUCOSE BLD-MCNC: 120 MG/DL (ref 70–99)
GLUCOSE SERPL-MCNC: 123 MG/DL (ref 70–99)
GLUCOSE UR STRIP.AUTO-MCNC: NEGATIVE MG/DL
HCT VFR BLD AUTO: 33.8 % (ref 36–48)
HGB BLD-MCNC: 11 G/DL (ref 12–16)
HGB UR QL STRIP.AUTO: ABNORMAL
HYALINE CASTS #/AREA URNS AUTO: 2 /LPF (ref 0–8)
INR PPP: 1.05 (ref 0.84–1.16)
KETONES UR STRIP.AUTO-MCNC: NEGATIVE MG/DL
LEUKOCYTE ESTERASE UR QL STRIP.AUTO: ABNORMAL
LYMPHOCYTES # BLD: 1.2 K/UL (ref 1–5.1)
LYMPHOCYTES NFR BLD: 14.9 %
MCH RBC QN AUTO: 29 PG (ref 26–34)
MCHC RBC AUTO-ENTMCNC: 32.6 G/DL (ref 31–36)
MCV RBC AUTO: 88.9 FL (ref 80–100)
MONOCYTES # BLD: 0.7 K/UL (ref 0–1.3)
MONOCYTES NFR BLD: 8.7 %
NEUTROPHILS # BLD: 6.2 K/UL (ref 1.7–7.7)
NEUTROPHILS NFR BLD: 75.3 %
NITRITE UR QL STRIP.AUTO: NEGATIVE
NT-PROBNP SERPL-MCNC: 733 PG/ML (ref 0–124)
PERFORMED ON: ABNORMAL
PH UR STRIP.AUTO: 7.5 [PH] (ref 5–8)
PLATELET # BLD AUTO: 427 K/UL (ref 135–450)
PMV BLD AUTO: 6.4 FL (ref 5–10.5)
POTASSIUM SERPL-SCNC: 4.6 MMOL/L (ref 3.5–5.1)
PROT SERPL-MCNC: 7.1 G/DL (ref 6.4–8.2)
PROT UR STRIP.AUTO-MCNC: ABNORMAL MG/DL
PROTHROMBIN TIME: 13.7 SEC (ref 11.5–14.8)
RBC # BLD AUTO: 3.8 M/UL (ref 4–5.2)
RBC CLUMPS #/AREA URNS AUTO: 3 /HPF (ref 0–4)
SODIUM SERPL-SCNC: 134 MMOL/L (ref 136–145)
SP GR UR STRIP.AUTO: 1.01 (ref 1–1.03)
TROPONIN, HIGH SENSITIVITY: 43 NG/L (ref 0–14)
TROPONIN, HIGH SENSITIVITY: 52 NG/L (ref 0–14)
UA COMPLETE W REFLEX CULTURE PNL UR: YES
UA DIPSTICK W REFLEX MICRO PNL UR: YES
URN SPEC COLLECT METH UR: ABNORMAL
UROBILINOGEN UR STRIP-ACNC: 1 E.U./DL
WBC # BLD AUTO: 8.2 K/UL (ref 4–11)
WBC #/AREA URNS HPF: >100 /HPF (ref 0–5)

## 2023-11-17 PROCEDURE — 6360000002 HC RX W HCPCS: Performed by: PHYSICIAN ASSISTANT

## 2023-11-17 PROCEDURE — 99285 EMERGENCY DEPT VISIT HI MDM: CPT

## 2023-11-17 PROCEDURE — 81001 URINALYSIS AUTO W/SCOPE: CPT

## 2023-11-17 PROCEDURE — 93010 ELECTROCARDIOGRAM REPORT: CPT | Performed by: INTERNAL MEDICINE

## 2023-11-17 PROCEDURE — 2580000003 HC RX 258: Performed by: INTERNAL MEDICINE

## 2023-11-17 PROCEDURE — 83880 ASSAY OF NATRIURETIC PEPTIDE: CPT

## 2023-11-17 PROCEDURE — 85610 PROTHROMBIN TIME: CPT

## 2023-11-17 PROCEDURE — 80053 COMPREHEN METABOLIC PANEL: CPT

## 2023-11-17 PROCEDURE — 6370000000 HC RX 637 (ALT 250 FOR IP): Performed by: PHYSICIAN ASSISTANT

## 2023-11-17 PROCEDURE — 84484 ASSAY OF TROPONIN QUANT: CPT

## 2023-11-17 PROCEDURE — 85730 THROMBOPLASTIN TIME PARTIAL: CPT

## 2023-11-17 PROCEDURE — 85025 COMPLETE CBC W/AUTO DIFF WBC: CPT

## 2023-11-17 PROCEDURE — 87086 URINE CULTURE/COLONY COUNT: CPT

## 2023-11-17 PROCEDURE — 2580000003 HC RX 258: Performed by: PHYSICIAN ASSISTANT

## 2023-11-17 PROCEDURE — 93005 ELECTROCARDIOGRAM TRACING: CPT | Performed by: PHYSICIAN ASSISTANT

## 2023-11-17 PROCEDURE — 6360000002 HC RX W HCPCS: Performed by: INTERNAL MEDICINE

## 2023-11-17 PROCEDURE — 71045 X-RAY EXAM CHEST 1 VIEW: CPT

## 2023-11-17 PROCEDURE — 6370000000 HC RX 637 (ALT 250 FOR IP): Performed by: INTERNAL MEDICINE

## 2023-11-17 PROCEDURE — 36415 COLL VENOUS BLD VENIPUNCTURE: CPT

## 2023-11-17 PROCEDURE — 87186 SC STD MICRODIL/AGAR DIL: CPT

## 2023-11-17 PROCEDURE — 87077 CULTURE AEROBIC IDENTIFY: CPT

## 2023-11-17 PROCEDURE — 1200000000 HC SEMI PRIVATE

## 2023-11-17 RX ORDER — PRAVASTATIN SODIUM 20 MG
20 TABLET ORAL NIGHTLY
Status: DISCONTINUED | OUTPATIENT
Start: 2023-11-17 | End: 2023-11-19 | Stop reason: HOSPADM

## 2023-11-17 RX ORDER — INSULIN LISPRO 100 [IU]/ML
0-8 INJECTION, SOLUTION INTRAVENOUS; SUBCUTANEOUS
Status: DISCONTINUED | OUTPATIENT
Start: 2023-11-18 | End: 2023-11-19 | Stop reason: HOSPADM

## 2023-11-17 RX ORDER — LANOLIN ALCOHOL/MO/W.PET/CERES
1000 CREAM (GRAM) TOPICAL DAILY
Status: DISCONTINUED | OUTPATIENT
Start: 2023-11-18 | End: 2023-11-19 | Stop reason: HOSPADM

## 2023-11-17 RX ORDER — SODIUM CHLORIDE 0.9 % (FLUSH) 0.9 %
5-40 SYRINGE (ML) INJECTION EVERY 12 HOURS SCHEDULED
Status: DISCONTINUED | OUTPATIENT
Start: 2023-11-17 | End: 2023-11-19 | Stop reason: HOSPADM

## 2023-11-17 RX ORDER — PANTOPRAZOLE SODIUM 40 MG/1
40 TABLET, DELAYED RELEASE ORAL
Status: DISCONTINUED | OUTPATIENT
Start: 2023-11-18 | End: 2023-11-19 | Stop reason: HOSPADM

## 2023-11-17 RX ORDER — METOPROLOL TARTRATE 50 MG/1
50 TABLET, FILM COATED ORAL 3 TIMES DAILY
Status: DISCONTINUED | OUTPATIENT
Start: 2023-11-17 | End: 2023-11-19 | Stop reason: HOSPADM

## 2023-11-17 RX ORDER — ACETAMINOPHEN 325 MG/1
650 TABLET ORAL EVERY 6 HOURS PRN
Status: DISCONTINUED | OUTPATIENT
Start: 2023-11-17 | End: 2023-11-19 | Stop reason: HOSPADM

## 2023-11-17 RX ORDER — MAGNESIUM SULFATE IN WATER 40 MG/ML
2000 INJECTION, SOLUTION INTRAVENOUS PRN
Status: DISCONTINUED | OUTPATIENT
Start: 2023-11-17 | End: 2023-11-19 | Stop reason: HOSPADM

## 2023-11-17 RX ORDER — ASPIRIN 325 MG
325 TABLET ORAL ONCE
Status: COMPLETED | OUTPATIENT
Start: 2023-11-17 | End: 2023-11-17

## 2023-11-17 RX ORDER — POTASSIUM CHLORIDE 7.45 MG/ML
10 INJECTION INTRAVENOUS PRN
Status: DISCONTINUED | OUTPATIENT
Start: 2023-11-17 | End: 2023-11-19 | Stop reason: HOSPADM

## 2023-11-17 RX ORDER — ONDANSETRON 2 MG/ML
4 INJECTION INTRAMUSCULAR; INTRAVENOUS EVERY 6 HOURS PRN
Status: DISCONTINUED | OUTPATIENT
Start: 2023-11-17 | End: 2023-11-19 | Stop reason: HOSPADM

## 2023-11-17 RX ORDER — ASPIRIN 81 MG/1
81 TABLET ORAL DAILY
Status: DISCONTINUED | OUTPATIENT
Start: 2023-11-18 | End: 2023-11-19 | Stop reason: HOSPADM

## 2023-11-17 RX ORDER — LISINOPRIL 20 MG/1
20 TABLET ORAL 2 TIMES DAILY
Status: DISCONTINUED | OUTPATIENT
Start: 2023-11-17 | End: 2023-11-19 | Stop reason: HOSPADM

## 2023-11-17 RX ORDER — ASPIRIN 81 MG/1
81 TABLET ORAL DAILY
Status: DISCONTINUED | OUTPATIENT
Start: 2023-11-17 | End: 2023-11-17

## 2023-11-17 RX ORDER — DILTIAZEM HYDROCHLORIDE 60 MG/1
60 CAPSULE, EXTENDED RELEASE ORAL 2 TIMES DAILY
Status: DISCONTINUED | OUTPATIENT
Start: 2023-11-17 | End: 2023-11-19 | Stop reason: HOSPADM

## 2023-11-17 RX ORDER — INSULIN LISPRO 100 [IU]/ML
0-4 INJECTION, SOLUTION INTRAVENOUS; SUBCUTANEOUS NIGHTLY
Status: DISCONTINUED | OUTPATIENT
Start: 2023-11-17 | End: 2023-11-19 | Stop reason: HOSPADM

## 2023-11-17 RX ORDER — SODIUM CHLORIDE 9 MG/ML
INJECTION, SOLUTION INTRAVENOUS PRN
Status: DISCONTINUED | OUTPATIENT
Start: 2023-11-17 | End: 2023-11-19 | Stop reason: HOSPADM

## 2023-11-17 RX ORDER — POLYETHYLENE GLYCOL 3350 17 G/17G
17 POWDER, FOR SOLUTION ORAL DAILY PRN
Status: DISCONTINUED | OUTPATIENT
Start: 2023-11-17 | End: 2023-11-19 | Stop reason: HOSPADM

## 2023-11-17 RX ORDER — POTASSIUM CHLORIDE 20 MEQ/1
40 TABLET, EXTENDED RELEASE ORAL PRN
Status: DISCONTINUED | OUTPATIENT
Start: 2023-11-17 | End: 2023-11-19 | Stop reason: HOSPADM

## 2023-11-17 RX ORDER — AZITHROMYCIN 500 MG/1
500 INJECTION, POWDER, LYOPHILIZED, FOR SOLUTION INTRAVENOUS ONCE
Status: DISCONTINUED | OUTPATIENT
Start: 2023-11-17 | End: 2023-11-17 | Stop reason: RX

## 2023-11-17 RX ORDER — SODIUM CHLORIDE 0.9 % (FLUSH) 0.9 %
5-40 SYRINGE (ML) INJECTION PRN
Status: DISCONTINUED | OUTPATIENT
Start: 2023-11-17 | End: 2023-11-19 | Stop reason: HOSPADM

## 2023-11-17 RX ORDER — ONDANSETRON 4 MG/1
4 TABLET, ORALLY DISINTEGRATING ORAL EVERY 8 HOURS PRN
Status: DISCONTINUED | OUTPATIENT
Start: 2023-11-17 | End: 2023-11-19 | Stop reason: HOSPADM

## 2023-11-17 RX ORDER — ACETAMINOPHEN 650 MG/1
650 SUPPOSITORY RECTAL EVERY 6 HOURS PRN
Status: DISCONTINUED | OUTPATIENT
Start: 2023-11-17 | End: 2023-11-19 | Stop reason: HOSPADM

## 2023-11-17 RX ADMIN — PRAVASTATIN SODIUM 20 MG: 20 TABLET ORAL at 21:16

## 2023-11-17 RX ADMIN — AZITHROMYCIN MONOHYDRATE 500 MG: 500 INJECTION, POWDER, LYOPHILIZED, FOR SOLUTION INTRAVENOUS at 18:45

## 2023-11-17 RX ADMIN — ASPIRIN 325 MG: 325 TABLET ORAL at 17:11

## 2023-11-17 RX ADMIN — DILTIAZEM HYDROCHLORIDE 60 MG: 60 CAPSULE, EXTENDED RELEASE ORAL at 21:16

## 2023-11-17 RX ADMIN — METOPROLOL TARTRATE 50 MG: 50 TABLET ORAL at 21:16

## 2023-11-17 RX ADMIN — APIXABAN 5 MG: 5 TABLET, FILM COATED ORAL at 21:16

## 2023-11-17 RX ADMIN — CEFTRIAXONE SODIUM 1000 MG: 1 INJECTION, POWDER, FOR SOLUTION INTRAMUSCULAR; INTRAVENOUS at 17:14

## 2023-11-17 RX ADMIN — LISINOPRIL 20 MG: 20 TABLET ORAL at 21:16

## 2023-11-17 RX ADMIN — Medication 10 ML: at 21:16

## 2023-11-17 ASSESSMENT — LIFESTYLE VARIABLES
HOW MANY STANDARD DRINKS CONTAINING ALCOHOL DO YOU HAVE ON A TYPICAL DAY: PATIENT DOES NOT DRINK
HOW OFTEN DO YOU HAVE A DRINK CONTAINING ALCOHOL: NEVER

## 2023-11-17 ASSESSMENT — PAIN DESCRIPTION - DESCRIPTORS: DESCRIPTORS: PRESSURE

## 2023-11-17 ASSESSMENT — ENCOUNTER SYMPTOMS
CHEST TIGHTNESS: 1
VOMITING: 0
DIARRHEA: 0
SHORTNESS OF BREATH: 1
ABDOMINAL PAIN: 0
RHINORRHEA: 0
WHEEZING: 0
NAUSEA: 0
COUGH: 0

## 2023-11-17 ASSESSMENT — PAIN SCALES - GENERAL: PAINLEVEL_OUTOF10: 5

## 2023-11-17 ASSESSMENT — PAIN DESCRIPTION - LOCATION: LOCATION: CHEST

## 2023-11-17 ASSESSMENT — PAIN - FUNCTIONAL ASSESSMENT: PAIN_FUNCTIONAL_ASSESSMENT: NONE - DENIES PAIN

## 2023-11-17 NOTE — PROGRESS NOTES
Pharmacy Home Medication Reconciliation Note    A medication reconciliation has been completed for [address] Yang 1950    Pharmacy: [address] [place] [address]. Wesleytutu Gaspar  Information provided by: patient    The patient's home medication list is as follows: No current facility-administered medications on file prior to encounter. Current Outpatient Medications on File Prior to Encounter   Medication Sig Dispense Refill    metoprolol (LOPRESSOR) 100 MG tablet Take 0.5 tablets by mouth 3 times daily 90 tablet 0    pantoprazole (PROTONIX) 40 MG tablet Take 1 tablet by mouth every morning (before breakfast) 30 tablet 5    dilTIAZem (CARDIZEM 12 HR) 60 MG extended release capsule TAKE 1 CAPSULE BY MOUTH TWICE A DAY (Patient taking differently: Take 1 capsule by mouth 2 times daily) 60 capsule 5    pravastatin (PRAVACHOL) 20 MG tablet Take 1 tablet by mouth daily (Patient taking differently: Take 1 tablet by mouth nightly) 90 tablet 1    lisinopril (PRINIVIL;ZESTRIL) 20 MG tablet Take 1 tablet by mouth 2 times daily      meclizine (ANTIVERT) 25 MG tablet Take 1 tablet by mouth daily      aspirin 81 MG EC tablet Take 1 tablet by mouth daily      magnesium oxide (MAG-OX) 400 (240 Mg) MG tablet Take 1 tablet by mouth daily 30 tablet 1    apixaban (ELIQUIS) 5 MG TABS tablet Take 1 tablet by mouth 2 times daily 60 tablet 1    famotidine (PEPCID) 20 MG tablet Take 1 tablet by mouth 2 times daily (Patient not taking: Reported on 11/17/2023) 60 tablet 3    ferrous sulfate 325 (65 FE) MG tablet Take 1 tablet by mouth daily (with breakfast)      calcium carbonate (OSCAL) 500 MG TABS tablet Take 600 mg by mouth 2 times daily. metformin (GLUCOPHAGE) 850 MG tablet Take 1 tablet by mouth 2 times daily (with meals)      Multiple Vitamin (MULTIVITAMIN PO) Take 1 tablet by mouth daily.       vitamin B-12 (CYANOCOBALAMIN) 1000 MCG tablet Take 1 tablet by mouth daily         Patient is no longer taking

## 2023-11-17 NOTE — ED NOTES
ED TO INPATIENT SBAR HANDOFF    Patient Name: Nish Li   :  1950  72 y.o. MRN:  [id number]  Preferred Name  [address]  ED Room #:  ED-0024/24  Family/Caregiver Present yes   Restraints no   Sitter no   Sepsis Risk Score Sepsis Risk Score: 2.29    Situation  Code Status: Prior No additional code details. Allergies: Codeine and Demerol  Weight: Patient Vitals for the past 96 hrs (Last 3 readings):   Weight   23 1337 74.8 kg (165 lb)     Arrived from: home  Chief Complaint:   Chief Complaint   Patient presents with    Shortness of Breath    Dizziness     Patient states she has been having dizziness, SOB and chest pressure since today. Patient had an ablation . Hx of vertigo and heart surgery. Hospital Problem/Diagnosis:  Active Problems:    * No active hospital problems. *  Resolved Problems:    * No resolved hospital problems. *    Imaging:   XR CHEST PORTABLE   Final Result   Mildly increased opacity in the right base may represent early infiltrate or   atelectasis. Recommend follow-up. No other acute finding and otherwise   little change, compared to the prior study.            Abnormal labs:   Abnormal Labs Reviewed   CBC WITH AUTO DIFFERENTIAL - Abnormal; Notable for the following components:       Result Value    RBC 3.80 (*)     Hemoglobin 11.0 (*)     Hematocrit 33.8 (*)     All other components within normal limits   COMPREHENSIVE METABOLIC PANEL - Abnormal; Notable for the following components:    Sodium 134 (*)     Chloride 96 (*)     Glucose 123 (*)     ALT 8 (*)     All other components within normal limits   TROPONIN - Abnormal; Notable for the following components:    Troponin, High Sensitivity 52 (*)     All other components within normal limits   TROPONIN - Abnormal; Notable for the following components:    Troponin, High Sensitivity 43 (*)     All other components within normal limits   BRAIN NATRIURETIC PEPTIDE - Abnormal; Notable for the following components:

## 2023-11-17 NOTE — ED PROVIDER NOTES
[facility], [place]        Pt Name: Bonnita Prader  MRN: [id number]  [address] [facility] [place] 1950  Date of evaluation: 11/17/2023  Provider: Yang White PA-C  PCP: Bree Song  Note Started: 4:14 PM EST 11/17/23       I have seen and evaluated this patient with my supervising physician Yogesh Ortiz, [address]       Chief Complaint   Patient presents with    Shortness of Breath    Dizziness     Patient states she has been having dizziness, SOB and chest pressure since today. Patient had an ablation 11/9. Hx of vertigo and heart surgery. HISTORY OF PRESENT ILLNESS: 1 or more Elements     History From: patient   Limitations to history : None    Bonnita Prader is a 72 y.o. female who presents for evaluation of lightheadedness that started this morning with associated exertional shortness of breath and chest pressure. Patient has history of atrial fibrillation and had cardiac ablation last week with Dr. Ambar Burgess. She also has history of prior CABG. Reports history of vertigo but states that this does not feel like a spinning type dizziness. States that she feels like she is lightheaded and may pass out. No falls injury or trauma. She has no other complaints or concerns at this time. Nursing Notes were all reviewed and agreed with or any disagreements were addressed in the HPI. REVIEW OF SYSTEMS :      Review of Systems   Constitutional:  Negative for appetite change, chills and fever. HENT:  Negative for congestion and rhinorrhea. Respiratory:  Positive for chest tightness and shortness of breath. Negative for cough and wheezing. Cardiovascular:  Negative for chest pain. Gastrointestinal:  Negative for abdominal pain, diarrhea, nausea and vomiting. Genitourinary:  Negative for difficulty urinating, dysuria and hematuria. Musculoskeletal:  Negative for neck pain and neck stiffness. Skin:  Negative for rash.

## 2023-11-17 NOTE — ACP (ADVANCE CARE PLANNING)
Advanced Care Planning Note. Purpose of Encounter: Advanced care planning in light of hospitalization  Parties In Attendance: Patient,    Decisional Capacity: Yes  Subjective: Patient  understand that this conversation is to address long term care goal  Objective: admitted to the hospital with presyncope and UTI   Goals of Care Determination: Patient would pursue CPR and Intubation if required. No tracheostomy or long term ventilation if required.      Code Status: full code  Time spent on Advanced care Plannin minutes  Advanced Care Planning Documents: documented patient's wishes, would like Bellaer Boone Ugarte to make medical decisions if unable to make decisions    Anne Hsieh MD  2023 6:19 PM

## 2023-11-17 NOTE — ED PROVIDER NOTES
In addition to the advanced practice provider, I personally saw Kim Jeffers and performed a substantive portion of the visit including all aspects of the medical decision making. Medical Decision Making  Troponin was found to be elevated at 52 and decreased to 43 on the second blood draw. Her previous troponins were in the teens. Patient's urine does show evidence of a UTI. Patient did recently have an ablation and the elevation in troponin may be secondary to the ablation but with the patient's near syncopal symptoms and elevated troponin I believe the patient needs to be admitted for further evaluation and treatment. Patient's BNP is elevated at 733 and in the past that has been over thousand. There is a mild increased opacity in the right base of the lung. White blood cell count is not elevated. Patient states she does feel palpitations and this is consistent with the patient's history of paroxysmal atrial fibrillation. She states the symptoms seem to have worsened today and this is the reason she came to the ER. Patient is also at risk for a cardiac event due to her history of a CABG. A call was placed to hospitalist to excepted the admission. Condition is critical.    Critical care time: 39 minutes. This excludes separately billable procedures. EKG  EKG done at 1:38 PM shows sinus rhythm at a rate of 84. VT intervals 164. QRST interval is 70. QTc intervals 434. Good overall axis. Good R wave progression. No ST elevation. No ST depression. When compared with an old EKG when compared with an old EKG done on November 9, 2023 the overall axis is the same. The R wave progression is the same. Both EKGs are similar. SEP-1  Is this patient to be included in the SEP-1 Core Measure due to severe sepsis or septic shock?    No    Screenings     Dennard Coma Scale  Eye Opening: Spontaneous  Best Verbal Response: Oriented  Best Motor Response: Obeys commands  Migdalia Coma Scale Score:

## 2023-11-18 LAB
ANION GAP SERPL CALCULATED.3IONS-SCNC: 13 MMOL/L (ref 3–16)
BASOPHILS # BLD: 0 K/UL (ref 0–0.2)
BASOPHILS NFR BLD: 0.2 %
BUN SERPL-MCNC: 12 MG/DL (ref 7–20)
CALCIUM SERPL-MCNC: 9 MG/DL (ref 8.3–10.6)
CHLORIDE SERPL-SCNC: 98 MMOL/L (ref 99–110)
CO2 SERPL-SCNC: 25 MMOL/L (ref 21–32)
CREAT SERPL-MCNC: <0.5 MG/DL (ref 0.6–1.2)
DEPRECATED RDW RBC AUTO: 13.6 % (ref 12.4–15.4)
EOSINOPHIL # BLD: 0.1 K/UL (ref 0–0.6)
EOSINOPHIL NFR BLD: 1.1 %
GFR SERPLBLD CREATININE-BSD FMLA CKD-EPI: >60 ML/MIN/{1.73_M2}
GLUCOSE BLD-MCNC: 140 MG/DL (ref 70–99)
GLUCOSE BLD-MCNC: 203 MG/DL (ref 70–99)
GLUCOSE BLD-MCNC: 215 MG/DL (ref 70–99)
GLUCOSE SERPL-MCNC: 106 MG/DL (ref 70–99)
HCT VFR BLD AUTO: 32.6 % (ref 36–48)
HGB BLD-MCNC: 10.7 G/DL (ref 12–16)
LYMPHOCYTES # BLD: 1.4 K/UL (ref 1–5.1)
LYMPHOCYTES NFR BLD: 20.7 %
MCH RBC QN AUTO: 29.5 PG (ref 26–34)
MCHC RBC AUTO-ENTMCNC: 32.9 G/DL (ref 31–36)
MCV RBC AUTO: 89.8 FL (ref 80–100)
MONOCYTES # BLD: 0.7 K/UL (ref 0–1.3)
MONOCYTES NFR BLD: 10.3 %
NEUTROPHILS # BLD: 4.7 K/UL (ref 1.7–7.7)
NEUTROPHILS NFR BLD: 67.7 %
PERFORMED ON: ABNORMAL
PLATELET # BLD AUTO: 388 K/UL (ref 135–450)
PMV BLD AUTO: 7.1 FL (ref 5–10.5)
POTASSIUM SERPL-SCNC: 4.4 MMOL/L (ref 3.5–5.1)
RBC # BLD AUTO: 3.63 M/UL (ref 4–5.2)
SODIUM SERPL-SCNC: 136 MMOL/L (ref 136–145)
TROPONIN, HIGH SENSITIVITY: 47 NG/L (ref 0–14)
WBC # BLD AUTO: 7 K/UL (ref 4–11)

## 2023-11-18 PROCEDURE — 36415 COLL VENOUS BLD VENIPUNCTURE: CPT

## 2023-11-18 PROCEDURE — 97161 PT EVAL LOW COMPLEX 20 MIN: CPT

## 2023-11-18 PROCEDURE — 84484 ASSAY OF TROPONIN QUANT: CPT

## 2023-11-18 PROCEDURE — 6360000002 HC RX W HCPCS: Performed by: PHYSICIAN ASSISTANT

## 2023-11-18 PROCEDURE — 97530 THERAPEUTIC ACTIVITIES: CPT

## 2023-11-18 PROCEDURE — 99223 1ST HOSP IP/OBS HIGH 75: CPT | Performed by: INTERNAL MEDICINE

## 2023-11-18 PROCEDURE — 2580000003 HC RX 258: Performed by: INTERNAL MEDICINE

## 2023-11-18 PROCEDURE — 80048 BASIC METABOLIC PNL TOTAL CA: CPT

## 2023-11-18 PROCEDURE — 97535 SELF CARE MNGMENT TRAINING: CPT

## 2023-11-18 PROCEDURE — 97165 OT EVAL LOW COMPLEX 30 MIN: CPT

## 2023-11-18 PROCEDURE — 2580000003 HC RX 258: Performed by: PHYSICIAN ASSISTANT

## 2023-11-18 PROCEDURE — 97116 GAIT TRAINING THERAPY: CPT

## 2023-11-18 PROCEDURE — 85025 COMPLETE CBC W/AUTO DIFF WBC: CPT

## 2023-11-18 PROCEDURE — 6370000000 HC RX 637 (ALT 250 FOR IP): Performed by: INTERNAL MEDICINE

## 2023-11-18 PROCEDURE — 1200000000 HC SEMI PRIVATE

## 2023-11-18 RX ORDER — AMOXICILLIN AND CLAVULANATE POTASSIUM 500; 125 MG/1; MG/1
1 TABLET, FILM COATED ORAL 3 TIMES DAILY
Qty: 12 TABLET | Refills: 0 | Status: SHIPPED | OUTPATIENT
Start: 2023-11-18 | End: 2023-11-22

## 2023-11-18 RX ADMIN — METOPROLOL TARTRATE 50 MG: 50 TABLET ORAL at 16:23

## 2023-11-18 RX ADMIN — PRAVASTATIN SODIUM 20 MG: 20 TABLET ORAL at 19:52

## 2023-11-18 RX ADMIN — DILTIAZEM HYDROCHLORIDE 60 MG: 60 CAPSULE, EXTENDED RELEASE ORAL at 09:45

## 2023-11-18 RX ADMIN — DILTIAZEM HYDROCHLORIDE 60 MG: 60 CAPSULE, EXTENDED RELEASE ORAL at 19:52

## 2023-11-18 RX ADMIN — ACETAMINOPHEN 650 MG: 325 TABLET ORAL at 04:47

## 2023-11-18 RX ADMIN — METOPROLOL TARTRATE 50 MG: 50 TABLET ORAL at 19:52

## 2023-11-18 RX ADMIN — LISINOPRIL 20 MG: 20 TABLET ORAL at 09:41

## 2023-11-18 RX ADMIN — CEFTRIAXONE SODIUM 1000 MG: 1 INJECTION, POWDER, FOR SOLUTION INTRAMUSCULAR; INTRAVENOUS at 16:30

## 2023-11-18 RX ADMIN — ASPIRIN 81 MG: 81 TABLET, COATED ORAL at 09:41

## 2023-11-18 RX ADMIN — Medication 1000 MCG: at 09:42

## 2023-11-18 RX ADMIN — LISINOPRIL 20 MG: 20 TABLET ORAL at 19:52

## 2023-11-18 RX ADMIN — APIXABAN 5 MG: 5 TABLET, FILM COATED ORAL at 19:52

## 2023-11-18 RX ADMIN — APIXABAN 5 MG: 5 TABLET, FILM COATED ORAL at 09:42

## 2023-11-18 RX ADMIN — Medication 10 ML: at 09:42

## 2023-11-18 RX ADMIN — INSULIN LISPRO 2 UNITS: 100 INJECTION, SOLUTION INTRAVENOUS; SUBCUTANEOUS at 13:22

## 2023-11-18 RX ADMIN — Medication 10 ML: at 19:52

## 2023-11-18 RX ADMIN — METOPROLOL TARTRATE 50 MG: 50 TABLET ORAL at 09:41

## 2023-11-18 ASSESSMENT — PAIN SCALES - GENERAL: PAINLEVEL_OUTOF10: 4

## 2023-11-18 ASSESSMENT — PAIN DESCRIPTION - LOCATION: LOCATION: BACK

## 2023-11-18 ASSESSMENT — PAIN DESCRIPTION - DESCRIPTORS: DESCRIPTORS: ACHING

## 2023-11-18 NOTE — CARE COORDINATION
11/18/23 1326   Readmission Assessment   Number of Days since last admission? 1-7 days   Previous Disposition Home Alone   Who is being Interviewed Patient   What was the patient's/caregiver's perception as to why they think they needed to return back to the hospital? Other (Comment)  (The pt stated she was having dizziness which lead her to retrun to the hospital.)   Did you visit your Primary Care Physician after you left the hospital, before you returned this time? No   Why weren't you able to visit your PCP? Did not have an appointment   Did you see a specialist, such as Cardiac, Pulmonary, Orthopedic Physician, etc. after you left the hospital? No   Who advised the patient to return to the hospital? Self-referral   Does the patient report anything that got in the way of taking their medications? No   In our efforts to provide the best possible care to you and others like you, can you think of anything that we could have done to help you after you left the hospital the first time, so that you might not have needed to return so soon?  Other (Comment)  (No concerns reported at thsi time.)
RETURNING to current housing:   Potential Assistance needed at discharge: Other (Comment) (TBD)            Potential DME:    Patient expects to discharge to: [address] [place] [address] Road for transportation at discharge: Other (see comment) (The pt states her friend will trasnport her home at discharge.)    Financial    Payor: MEDICARE / Plan: MEDICARE PART A AND B / Product Type: *No Product type* /     Does insurance require precert for SNF: No    Potential assistance Purchasing Medications: No  Meds-to-Beds request:        [id number] [address]  Phone: [phone] Fax: [phone]      Notes:    Factors facilitating achievement of predicted outcomes: Family support, Motivated, Cooperative, and Pleasant    Barriers to discharge: Medical complications    Additional Case Management Notes: The SW spoke with the patient. The pt is from home alone and has no needs at this time.      The Plan for Transition of Care is related to the following treatment goals of Lightheadedness [R42]  Pre-syncope [R55]  Elevated troponin [R79.89]  Acute cystitis without hematuria [N30.00]      Karen Headings, MSW  Case Management Department

## 2023-11-18 NOTE — PROGRESS NOTES
4 Eyes Skin Assessment     NAME:  Michael Husbands  YOB: 1950  MEDICAL RECORD NUMBER:  [id number]    The patient is being assessed for  Admission    I agree that at least one RN has performed a thorough Head to Toe Skin Assessment on the patient. ALL assessment sites listed below have been assessed. Areas assessed by both nurses:    Head, Face, Ears, Shoulders, Back, Chest, Arms, Elbows, Hands, Sacrum. Buttock, Coccyx, Ischium, Legs. Feet and Heels, and Under Medical Devices         Does the Patient have a Wound?  No noted wound(s)       Low Prevention initiated by RN: No  Wound Care Orders initiated by RN: No    Pressure Injury (Stage 3,4, Unstageable, DTI, NWPT, and Complex wounds) if present, place Wound referral order by RN under : No    New Ostomies, if present place, Ostomy referral order under : No     Nurse 1 eSignature: Electronically signed by Janie Alvarado RN on 11/17/23 at 11:33 PM EST    **SHARE this note so that the co-signing nurse can place an eSignature**    Nurse 2 eSignature: Electronically signed by Lyn Kan RN on 11/18/23 at 6:29 AM EST

## 2023-11-18 NOTE — PROGRESS NOTES
[address] [facility] Department   Phone: [phone]    Physical Therapy    [x] Initial Evaluation            [] Daily Treatment Note         [] Discharge Summary      Patient: Meri Toure   : 1950   MRN: [id number]   Date of Service:  2023  Admitting Diagnosis: Pre-syncope  Current Admission Summary: Meri Toure is a 72 y.o. female cardiac ablation on the  and was discharged patient was doing fine at home and this morning was sitting and watching TV when suddenly patient felt lightheaded like she was going to faint short of breath along with nausea. Denies any chest pain with this or palpitations denies any fever chills at home or significant cough or sick contacts. Past Medical History:  has a past medical history of Acute CVA (cerebrovascular accident) ([address]), Arthritis, CAD (coronary artery disease), Diabetes mellitus ([address]), Hyperlipidemia, and Hypertension. Past Surgical History:  has a past surgical history that includes Coronary artery bypass graft (); Gastric bypass surgery (); Cardiac surgery (); Dilatation, esophagus; Cholecystectomy; and Total hip arthroplasty (Left, 2023). Discharge Recommendations: Meri Toure scored a 19/24 on the AM-PAC short mobility form. At this time, no further PT is recommended upon discharge as pt is presenting near her baseline level of function. Recommend patient returns to prior setting with prior services.    DME Required For Discharge: no DME required at discharge, patient has all required DME for discharge  Precautions/Restrictions: high fall risk, up as tolerated  Weight Bearing Restrictions: no restrictions     Required Braces/Orthotics: no braces required  Positional Restrictions:no positional restrictions    Pre-Admission Information   Lives With: alone                     Type of Home: apartment  Home Layout: one level  Home Access: level entry - one step off porch into kitchen   Bathroom

## 2023-11-18 NOTE — PROGRESS NOTES
[address]  [facility] Department   Phone: [phone]    Occupational Therapy    [x] Initial Evaluation            [] Daily Treatment Note         [] Discharge Summary      Patient: Lucius Marcano   : 1950   MRN: [id number]   Date of Service:  2023    Admitting Diagnosis:  Pre-syncope  Current Admission Summary: Lucius Marcano is a 72 y.o. female cardiac ablation on the  and was discharged patient was doing fine at home and this morning was sitting and watching TV when suddenly patient felt lightheaded like she was going to faint short of breath along with nausea. Denies any chest pain with this or palpitations denies any fever chills at home or significant cough or sick contacts. Found to have a UTI. Treated with antibiotics   Past Medical History:  has a past medical history of Acute CVA (cerebrovascular accident) ([address]), Arthritis, CAD (coronary artery disease), Diabetes mellitus ([address]), Hyperlipidemia, and Hypertension. Past Surgical History:  has a past surgical history that includes Coronary artery bypass graft (); Gastric bypass surgery (); Cardiac surgery (); Dilatation, esophagus; Cholecystectomy; and Total hip arthroplasty (Left, 2023). Discharge Recommendations: Lucius Marcano scored a 24/24 on the AM-PAC ADL Inpatient form. At this time, no further OT is recommended upon discharge due to at/close to baseline function. Recommend patient returns to prior setting with prior services.         DME Required For Discharge: no DME required at discharge, patient has all required DME for discharge    Precautions/Restrictions: high fall risk  Weight Bearing Restrictions: no restrictions  [] Right Upper Extremity  [] Left Upper Extremity [] Right Lower Extremity  [] Left Lower Extremity     Required Braces/Orthotics: no braces required   [] Right  [] Left  Positional Restrictions:no positional restrictions    Pre-Admission

## 2023-11-18 NOTE — PROGRESS NOTES
Pt returned to bed. Redness/open excoriation under abdomen folds cleaned with soap and water and interdry applied. Rt groin/leg bruised from previous ablation. Fall precautions in place.

## 2023-11-18 NOTE — CONSULTS
40 mEq Oral PRN Mary Mcnamara MD        Or    potassium chloride 10 mEq/100 mL IVPB (Peripheral Line)  10 mEq IntraVENous PRN Mary Mcnamara MD        magnesium sulfate 2000 mg in 50 mL IVPB premix  2,000 mg IntraVENous PRN Mary Mcnamara MD        ondansetron (ZOFRAN-ODT) disintegrating tablet 4 mg  4 mg Oral Q8H PRN Mary Mcnamara MD        Or    ondansetron (ZOFRAN) injection 4 mg  4 mg IntraVENous Q6H PRN Mary Mcnamara MD        polyethylene glycol (GLYCOLAX) packet 17 g  17 g Oral Daily PRN Mary Mcnamara MD        acetaminophen (TYLENOL) tablet 650 mg  650 mg Oral Q6H PRN Mary Mcnamara MD   650 mg at 11/18/23 0447    Or    acetaminophen (TYLENOL) suppository 650 mg  650 mg Rectal Q6H PRN Mary Mcnamara MD        apixaban (ELIQUIS) tablet 5 mg  5 mg Oral BID Mary Mcnamara MD   5 mg at 11/18/23 0942    dilTIAZem (CARDIZEM 12 HR) extended release capsule 60 mg  60 mg Oral BID Mary Mcnamara MD   60 mg at 11/18/23 0945    lisinopril (PRINIVIL;ZESTRIL) tablet 20 mg  20 mg Oral BID Mary Mcnamara MD   20 mg at 11/18/23 0941    insulin lispro (HUMALOG) injection vial 0-8 Units  0-8 Units SubCUTAneous TID WC Mary Mcnamara MD        insulin lispro (HUMALOG) injection vial 0-4 Units  0-4 Units SubCUTAneous Nightly Mary Mcnamara MD        metoprolol tartrate (LOPRESSOR) tablet 50 mg  50 mg Oral TID Mary Mcnamara MD   50 mg at 11/18/23 0941    pantoprazole (PROTONIX) tablet 40 mg  40 mg Oral Araseli Gold MD        vitamin B-12 (CYANOCOBALAMIN) tablet 1,000 mcg  1,000 mcg Oral Daily Mary Mcnamara MD   1,000 mcg at 11/18/23 0942    pravastatin (PRAVACHOL) tablet 20 mg  20 mg Oral Nightly Mary Mcnamara MD   20 mg at 11/17/23 2116    aspirin EC tablet 81 mg  81 mg Oral Daily Mary Mcnamara MD   81 mg at 11/18/23 0941      Allergies:  Codeine and Demerol   Review of Systems:   All systems reviewed and negative except as stated

## 2023-11-18 NOTE — DISCHARGE SUMMARY
ALT 8 11/17/2023 02:55 PM    AST 15 11/17/2023 02:55 PM    BILITOT 0.3 11/17/2023 02:55 PM    LABALBU 4.5 11/17/2023 02:55 PM    LDLCALC 55 12/12/2021 06:36 AM    TRIG 69 12/12/2021 06:36 AM     Lab Results   Component Value Date    INR 1.05 11/17/2023    INR 0.97 03/26/2023    INR 1.02 12/11/2021       Radiology:  XR CHEST PORTABLE    Result Date: 11/17/2023  EXAMINATION: ONE XRAY VIEW OF THE CHEST 11/17/2023 3:03 pm COMPARISON: 1 September 2023 HISTORY: ORDERING SYSTEM PROVIDED HISTORY: SOB TECHNOLOGIST PROVIDED HISTORY: Reason for exam:->SOB FINDINGS: Lungs: Well inflated. Mildly increased interstitial markings in the right base with equivocal mild bronchiectasis. Heart and mediastinum: Median sternotomy wires. Mediastinal clips. The heart is normal in size. Trachea is midline. Deya are symmetrical, no mass. Osseous structures: Unremarkable Abdomen: Nonspecific bowel gas pattern. Mildly increased opacity in the right base may represent early infiltrate or atelectasis. Recommend follow-up. No other acute finding and otherwise little change, compared to the prior study. CTA ABDOMINAL AORTA W BILAT RUNOFF W CONTRAST    Result Date: 11/9/2023  EXAMINATION: CTA OF THE AORTA WITH LOWER EXTREMITY RUNOFF 11/9/2023 4:58 pm TECHNIQUE: CTA of the pelvis and bilateral lower extremities was performed after the administration of intravenous contrast.   Multiplanar reformatted images are provided for review. MIP images are provided for review. Automated exposure control, iterative reconstruction, and/or weight based adjustment of the mA/kV was utilized to reduce the radiation dose to as low as reasonably achievable. COMPARISON: None.  HISTORY: ORDERING SYSTEM PROVIDED HISTORY: patient is s/p ablation, right sided hematoma to assess for retroperitoneal bleeding and vascular injury TECHNOLOGIST PROVIDED HISTORY: Reason for exam:->patient is s/p ablation, right sided hematoma to assess for retroperitoneal bleeding and

## 2023-11-18 NOTE — FLOWSHEET NOTE
11/18/23 1530   Vital Signs   Patient Position Sitting   Orthostatic B/P and Pulse? Yes   Blood Pressure Sitting 148/61   Pulse Sitting 81 PER MINUTE   Blood Pressure Standing 138/87   Pulse Standing 80 PER MINUTE     Pt sitting in chair c/o dizziness. Sitting and standing orthos vs completed. MD notified.

## 2023-11-19 VITALS
WEIGHT: 155.1 LBS | HEART RATE: 82 BPM | SYSTOLIC BLOOD PRESSURE: 132 MMHG | BODY MASS INDEX: 30.45 KG/M2 | OXYGEN SATURATION: 98 % | HEIGHT: 60 IN | RESPIRATION RATE: 18 BRPM | DIASTOLIC BLOOD PRESSURE: 79 MMHG | TEMPERATURE: 97.7 F

## 2023-11-19 LAB
BACTERIA UR CULT: ABNORMAL
GLUCOSE BLD-MCNC: 124 MG/DL (ref 70–99)
GLUCOSE BLD-MCNC: 216 MG/DL (ref 70–99)
ORGANISM: ABNORMAL
PERFORMED ON: ABNORMAL
PERFORMED ON: ABNORMAL

## 2023-11-19 PROCEDURE — 6370000000 HC RX 637 (ALT 250 FOR IP): Performed by: INTERNAL MEDICINE

## 2023-11-19 PROCEDURE — 2500000003 HC RX 250 WO HCPCS: Performed by: INTERNAL MEDICINE

## 2023-11-19 PROCEDURE — 2580000003 HC RX 258: Performed by: INTERNAL MEDICINE

## 2023-11-19 RX ADMIN — METOPROLOL TARTRATE 50 MG: 50 TABLET ORAL at 09:46

## 2023-11-19 RX ADMIN — PANTOPRAZOLE SODIUM 40 MG: 40 TABLET, DELAYED RELEASE ORAL at 05:22

## 2023-11-19 RX ADMIN — LISINOPRIL 20 MG: 20 TABLET ORAL at 09:46

## 2023-11-19 RX ADMIN — Medication 1000 MCG: at 09:46

## 2023-11-19 RX ADMIN — ACETAMINOPHEN 650 MG: 325 TABLET ORAL at 05:23

## 2023-11-19 RX ADMIN — Medication 10 ML: at 09:47

## 2023-11-19 RX ADMIN — MICONAZOLE NITRATE: 20 POWDER TOPICAL at 00:25

## 2023-11-19 RX ADMIN — DILTIAZEM HYDROCHLORIDE 60 MG: 60 CAPSULE, EXTENDED RELEASE ORAL at 09:46

## 2023-11-19 RX ADMIN — APIXABAN 5 MG: 5 TABLET, FILM COATED ORAL at 09:46

## 2023-11-19 RX ADMIN — ASPIRIN 81 MG: 81 TABLET, COATED ORAL at 09:46

## 2023-11-19 RX ADMIN — MICONAZOLE NITRATE: 20 POWDER TOPICAL at 09:46

## 2023-11-19 ASSESSMENT — PAIN DESCRIPTION - LOCATION: LOCATION: BACK

## 2023-11-19 ASSESSMENT — PAIN SCALES - WONG BAKER: WONGBAKER_NUMERICALRESPONSE: 0

## 2023-11-19 ASSESSMENT — PAIN SCALES - GENERAL
PAINLEVEL_OUTOF10: 3
PAINLEVEL_OUTOF10: 0

## 2023-11-19 ASSESSMENT — PAIN - FUNCTIONAL ASSESSMENT: PAIN_FUNCTIONAL_ASSESSMENT: ACTIVITIES ARE NOT PREVENTED

## 2023-11-19 ASSESSMENT — PAIN DESCRIPTION - ORIENTATION: ORIENTATION: MID

## 2023-11-19 ASSESSMENT — PAIN DESCRIPTION - DESCRIPTORS: DESCRIPTORS: ACHING

## 2023-11-19 NOTE — PLAN OF CARE
Problem: Discharge Planning  Goal: Discharge to home or other facility with appropriate resources  Outcome: Progressing  Flowsheets (Taken 11/18/2023 1951)  Discharge to home or other facility with appropriate resources: Identify barriers to discharge with patient and caregiver     Problem: Safety - Adult  Goal: Free from fall injury  Outcome: Progressing  Flowsheets (Taken 11/18/2023 1951)  Free From Fall Injury: Instruct family/caregiver on patient safety

## 2023-11-19 NOTE — PROGRESS NOTES
Pt evening shift assessment complete. VSS and medication given as ordered. Pt assessed for comfort needs, given snack per pt request  Pt does not express any additional needs at this time, resting comfortably in bed.

## 2023-11-19 NOTE — PROGRESS NOTES
Data- discharge order received, pt verbalized agreement to discharge, disposition to previous residence, no needs for HHC/DME. Action- discharge instructions prepared and given to pt, pt verbalized understanding. Medication information packet given r/t NEW and/or CHANGED prescriptions emphasizing name/purpose/side effects, pt verbalized understanding. Discharge instruction summary: Diet- carb control, Activity- as tolerated, Primary Care Physician as follows: Janel Escalona [phone] f/u appointment in 1 week, immunizations reviewed and n/a, prescription medications filled at [facility]. Inpatient surgical procedure precautions reviewed: n/a CHF Education reviewed. Pt/ Family has had a total of 60 minutes CHF education this admission encounter. 1. WEIGHT: Admit Weight - Scale: 74.8 kg (165 lb) (11/17/23 1337)        Today  Weight - Scale: 70.4 kg (155 lb 1.6 oz) (11/19/23 0523)       2. O2 SAT.: SpO2: 98 % (11/19/23 0815)    Response- Pt belongings gathered, IV removed. Disposition is home (no HHC/DME needs), transported with brother, taken to lobby via w/c w/ pca, no complications.

## 2023-11-24 RX ORDER — PRAVASTATIN SODIUM 20 MG
20 TABLET ORAL NIGHTLY
Qty: 90 TABLET | Refills: 3 | Status: SHIPPED | OUTPATIENT
Start: 2023-11-24

## 2023-11-26 ENCOUNTER — TELEPHONE (OUTPATIENT)
Dept: CARDIOLOGY CLINIC | Age: 73
End: 2023-11-26

## 2023-11-27 ENCOUNTER — TELEPHONE (OUTPATIENT)
Dept: CARDIOLOGY CLINIC | Age: 73
End: 2023-11-27

## 2023-11-27 NOTE — TELEPHONE ENCOUNTER
Patient noted to have pauses ~ 6 seconds on 11/22/2023 and 11/24/2023 in the morning. I called patient and discussed findings with her. She states that she had dizzy spells. Treatment options including pacemaker implantation discussed with patient, including risks, benefits and alternative discussed. The risks, benefits and alternatives of the procedure were discussed with the patient. The risks including, but not limited to, the risks of bleeding, infection, pain, device malfunction, lead dislodgement, radiation exposure, injury to cardiac and surrounding structures (including pneumothorax), stroke, cardiac perforation, tamponade, need for emergent heart surgery, myocardial infarction etc were discussed in detail. Dual vs single chamber device, including risks and benefits were discussed with patient. She would like to think about the procedure and will let us know. If she would like to proceed, will schedule for dual chamber pacemaker implantation.      Paty Bhardwaj MD, MPH  [address] [place] [address]   Office: [phone]  Fax: [phone]

## 2023-11-27 NOTE — TELEPHONE ENCOUNTER
Remote alert shows patient has had 2 long pauses recently in late morning hours. One recording was 5 seconds the other showed 6 seconds long. Report sent to [address]

## 2023-11-28 ENCOUNTER — TELEPHONE (OUTPATIENT)
Dept: CARDIOLOGY CLINIC | Age: 73
End: 2023-11-28

## 2023-11-28 NOTE — TELEPHONE ENCOUNTER
Pt has decided to go with the pacemaker, but she has questions concerning her blood pressure medications affecting her heart rate. Please call to advise if she should adjust medications before she has her procedure to possible keep her from getting dizzy and almost blacking out. Please also call to schedule pacemaker procedure.

## 2023-12-21 PROCEDURE — 93298 REM INTERROG DEV EVAL SCRMS: CPT | Performed by: INTERNAL MEDICINE

## 2023-12-21 PROCEDURE — G2066 INTER DEVC REMOTE 30D: HCPCS | Performed by: INTERNAL MEDICINE

## 2023-12-27 ENCOUNTER — TELEPHONE (OUTPATIENT)
Dept: CARDIOLOGY CLINIC | Age: 73
End: 2023-12-27

## 2023-12-27 NOTE — TELEPHONE ENCOUNTER
Spoke with the pt and she went ahead and scheduled procedure. She isn't having any issues. She will be seeing Edith Genao on 2/1 and will talk to him at that time if she needs to keep appt. We went over instructions below and she verbalized understanding. Procedure -  dual chamber pacemaker   Date: 2/7/2024  Arrival time: 12:30 pm   Procedure time: 1:30 pm      Our  will call you to discuss a date for you procedure. The Cath Lab will call you a week before your procedure. The night before your procedure you will need to scrub with Hibiclens wash. The day of your procedure you will need to check in at the registration desk, which is in the main lobby at [address] will need to fast for at least 8 hours prior to your procedure. You will need  to hold eliquis  for the night before and the morning of th procedure. You may take all other medications with a sip of water the morning of your procedure. Please have a responsible adult to drive you home upon discharge. The discharging unit will be giving you discharge instructions. If you have any questions regarding your procedure itself or your medications, please call [phone] and ask to talk to an EP nurse. You will be seen in the office in 1 week for a wound check and then 3 months following implantation. Our  will call you to discuss a date for you procedure. The Cath Lab will call you a week before your procedure. The night before your procedure you will need to scrub with Hibiclens wash.      The day of your procedure you will need to check in at the registration desk, which is in the main lobby at [facility] updated / added in [facility] / emailed cath lab

## 2024-01-02 NOTE — PROGRESS NOTES
benefits were discussed with patient.     Printed information about ablation including risks were given. Patient was encouraged to review information given, and call back with any questions about risks, benefits and alternative of procedure.    -----> The patient opted to proceed with the device implantation. Do not take eliquis the night prior or morning of the procedure. Instructed to hibiclens to chest    4. HTN  - Uncontrolled here. Pt reports it is \"fine\" at home  ~ Goal <130/80  - Continue current medications. Discussed adding medication, but she declined  - She states her BP is \"fine\" at home and is high due to the long walk to our office. She will monitor her BP closely at home, then call us if consistently elevated >130 at home. We will also recheck it at her pacemaker placement next week. If it remains high, we can add cardizem or amlodipine  - Discussed importance of low sodium diet, weight control and exercise    4. CAD  - Hx of CABG (1993) and PCI  - Stable  - No complaints of angina  - Continue ASA, ACEI, BB, and statin    5. Tobacco Abuse   - Pt continues to smoke cigarettes daily   - Discussed benefits of cessation and risks of continued use   - Tobacco cessation counseling completed    6. Hx of CVA   - Continue Eliquis, ASA, statin    ~ I will switch statins due to interaction with cardizem    7. Obesity  - Body mass index is 32.42 kg/m².   - Complicating assessment and treatment. Placing patient at risk for multiple co-morbidities as well as early death and contributing to the patient's presentation  - Discussed weight loss and patient was encouraged to reduce calorie intake and increase exercise    8. Fall   - No prodromal symptoms with hip fx   - If further issues with falls, consider ANGELA closure in future    Plan:  1. No medication changes  2. Monitor BP closely at home. Call if consistently >130 at home so we can add medication  3. Hibiclens to chest the night before and morning of the

## 2024-01-25 PROCEDURE — 93298 REM INTERROG DEV EVAL SCRMS: CPT | Performed by: INTERNAL MEDICINE

## 2024-02-01 ENCOUNTER — OFFICE VISIT (OUTPATIENT)
Dept: CARDIOLOGY CLINIC | Age: 74
End: 2024-02-01
Payer: MEDICARE

## 2024-02-01 ENCOUNTER — NURSE ONLY (OUTPATIENT)
Dept: CARDIOLOGY CLINIC | Age: 74
End: 2024-02-01

## 2024-02-01 VITALS
HEART RATE: 74 BPM | SYSTOLIC BLOOD PRESSURE: 168 MMHG | HEIGHT: 60 IN | OXYGEN SATURATION: 97 % | DIASTOLIC BLOOD PRESSURE: 80 MMHG | BODY MASS INDEX: 32.59 KG/M2 | WEIGHT: 166 LBS

## 2024-02-01 DIAGNOSIS — I10 HTN (HYPERTENSION), BENIGN: ICD-10-CM

## 2024-02-01 DIAGNOSIS — I25.119 CORONARY ARTERY DISEASE INVOLVING NATIVE CORONARY ARTERY OF NATIVE HEART WITH ANGINA PECTORIS (HCC): ICD-10-CM

## 2024-02-01 DIAGNOSIS — E78.5 DYSLIPIDEMIA: ICD-10-CM

## 2024-02-01 DIAGNOSIS — I63.9 ACUTE CVA (CEREBROVASCULAR ACCIDENT) (HCC): ICD-10-CM

## 2024-02-01 DIAGNOSIS — I48.0 PAF (PAROXYSMAL ATRIAL FIBRILLATION) (HCC): Primary | ICD-10-CM

## 2024-02-01 PROBLEM — R55 PRE-SYNCOPE: Status: RESOLVED | Noted: 2023-11-17 | Resolved: 2024-02-01

## 2024-02-01 PROBLEM — S72.042S: Status: RESOLVED | Noted: 2023-03-26 | Resolved: 2024-02-01

## 2024-02-01 PROBLEM — S72.002A CLOSED FRACTURE OF NECK OF LEFT FEMUR (HCC): Status: RESOLVED | Noted: 2023-03-28 | Resolved: 2024-02-01

## 2024-02-01 PROCEDURE — 4004F PT TOBACCO SCREEN RCVD TLK: CPT | Performed by: NURSE PRACTITIONER

## 2024-02-01 PROCEDURE — 93000 ELECTROCARDIOGRAM COMPLETE: CPT | Performed by: NURSE PRACTITIONER

## 2024-02-01 PROCEDURE — 3079F DIAST BP 80-89 MM HG: CPT | Performed by: NURSE PRACTITIONER

## 2024-02-01 PROCEDURE — 1090F PRES/ABSN URINE INCON ASSESS: CPT | Performed by: NURSE PRACTITIONER

## 2024-02-01 PROCEDURE — G8427 DOCREV CUR MEDS BY ELIG CLIN: HCPCS | Performed by: NURSE PRACTITIONER

## 2024-02-01 PROCEDURE — 99214 OFFICE O/P EST MOD 30 MIN: CPT | Performed by: NURSE PRACTITIONER

## 2024-02-01 PROCEDURE — 1123F ACP DISCUSS/DSCN MKR DOCD: CPT | Performed by: NURSE PRACTITIONER

## 2024-02-01 PROCEDURE — 3077F SYST BP >= 140 MM HG: CPT | Performed by: NURSE PRACTITIONER

## 2024-02-01 PROCEDURE — G8417 CALC BMI ABV UP PARAM F/U: HCPCS | Performed by: NURSE PRACTITIONER

## 2024-02-01 PROCEDURE — 3017F COLORECTAL CA SCREEN DOC REV: CPT | Performed by: NURSE PRACTITIONER

## 2024-02-01 PROCEDURE — G8484 FLU IMMUNIZE NO ADMIN: HCPCS | Performed by: NURSE PRACTITIONER

## 2024-02-01 PROCEDURE — G8400 PT W/DXA NO RESULTS DOC: HCPCS | Performed by: NURSE PRACTITIONER

## 2024-02-01 RX ORDER — METOPROLOL TARTRATE 100 MG/1
50 TABLET ORAL 3 TIMES DAILY
Qty: 90 TABLET | Refills: 0 | Status: SHIPPED
Start: 2024-02-01

## 2024-02-01 NOTE — PATIENT INSTRUCTIONS
1. No medication changes  2. Monitor BP closely at home. Call if consistently >130 at home so we can add medication  3. Hibiclens to chest the night before and morning of the procedure. Do not take eliquis the day before or morning of

## 2024-02-01 NOTE — PROGRESS NOTES
Patient comes in for their OV with NPSR today.   S/p Medtronic Reveal LNQ22 LINQ II™ (ICM)  implant on 2/17/2022 with Dr. Rodrigez.     [facility] Interrogation shows Battery Status GOOD. Since 11/27/2023 no episodes noted. PVC OFF. Prior AF/Tachy/pause episodes noted. Implanted for AF management. Patient remains on Eliquis and metoprolol. Please see interrogation scanned under media tab for more detail. We will continue to follow the Patient remotely.

## 2024-02-05 ENCOUNTER — TELEPHONE (OUTPATIENT)
Dept: CARDIOLOGY CLINIC | Age: 74
End: 2024-02-05

## 2024-02-05 NOTE — TELEPHONE ENCOUNTER
Spoke with the pt and got her rescheduled for procedure. We went over instructions below and she verbalized understanding.    Procedure -  dual chamber pacemaker   Date: 3/21/2024  Arrival time: 9:30 am   Procedure time: 10:30 am        The night before your procedure you will need to scrub with Hibiclens wash.     The day of your procedure you will need to check in at the registration desk, which is in the main lobby at [facility].     PRE-PROCEDURE  INSTRUCTIONS              You will need to fast for at least 8 hours prior to your procedure.              You will need  to hold eliquis  for the night before and the morning of th procedure.               You may take all other medications with a sip of water the morning of your procedure.              Please have a responsible adult to drive you home upon discharge.              The discharging unit will be giving you discharge instructions.     If you have any questions regarding your procedure itself or your medications, please call [phone] and ask to talk to an EP nurse.     You will be seen in the office in 1 week for a wound check and then 3 months following implantation.       Our  will call you to discuss a date for you procedure.  The Cath Lab will call you a week before your procedure.     The night before your procedure you will need to scrub with Hibiclens wash.     The day of your procedure you will need to check in at the registration desk, which is in the main lobby at [facility].    Qgenda updated / added in epic / emailed cath lab

## 2024-02-07 ENCOUNTER — HOSPITAL ENCOUNTER (OUTPATIENT)
Dept: CARDIAC CATH/INVASIVE PROCEDURES | Age: 74
Discharge: HOME | End: 2024-02-07

## 2024-03-11 RX ORDER — PANTOPRAZOLE SODIUM 40 MG/1
40 TABLET, DELAYED RELEASE ORAL
Qty: 90 TABLET | Refills: 1 | OUTPATIENT
Start: 2024-03-11

## 2024-03-11 NOTE — TELEPHONE ENCOUNTER
Lov 2/1/2024  Labs 11/10/2023 CBC & BMP  Lrf 11/9/2023 Protonix 40 mg disp 30+5  Appt 5/3/2024 please advise thanks

## 2024-03-18 ENCOUNTER — TELEPHONE (OUTPATIENT)
Dept: CARDIOLOGY CLINIC | Age: 74
End: 2024-03-18

## 2024-03-18 NOTE — TELEPHONE ENCOUNTER
Pt is scheduled to receive a pace maker 3/21 but cannot get a ride.    She would like to cancel the procedure.    Please advise.

## 2024-03-20 RX ORDER — SODIUM CHLORIDE 0.9 % (FLUSH) 0.9 %
5-40 SYRINGE (ML) INJECTION EVERY 12 HOURS SCHEDULED
OUTPATIENT
Start: 2024-03-20

## 2024-03-20 RX ORDER — SODIUM CHLORIDE 0.9 % (FLUSH) 0.9 %
5-40 SYRINGE (ML) INJECTION PRN
OUTPATIENT
Start: 2024-03-20

## 2024-03-20 RX ORDER — SODIUM CHLORIDE 9 MG/ML
INJECTION, SOLUTION INTRAVENOUS PRN
OUTPATIENT
Start: 2024-03-20

## 2024-03-20 NOTE — TELEPHONE ENCOUNTER
Spoke with the pt and she is going to cancel her PPM for tomorrow. She will call back to reschedule if she starts feeling bad.     Qgenda updated / updated epic/ emailed [facility]

## 2024-03-21 ENCOUNTER — HOSPITAL ENCOUNTER (OUTPATIENT)
Dept: CARDIAC CATH/INVASIVE PROCEDURES | Age: 74
Discharge: HOME | End: 2024-03-21

## 2024-04-03 PROBLEM — I63.9 ACUTE CVA (CEREBROVASCULAR ACCIDENT) (HCC): Status: RESOLVED | Noted: 2021-12-11 | Resolved: 2024-04-03

## 2024-04-07 ENCOUNTER — APPOINTMENT (OUTPATIENT)
Dept: CT IMAGING | Age: 74
DRG: 303 | End: 2024-04-07
Payer: MEDICARE

## 2024-04-07 ENCOUNTER — APPOINTMENT (OUTPATIENT)
Dept: GENERAL RADIOLOGY | Age: 74
DRG: 303 | End: 2024-04-07
Payer: MEDICARE

## 2024-04-07 ENCOUNTER — HOSPITAL ENCOUNTER (INPATIENT)
Age: 74
LOS: 1 days | Discharge: HOME | DRG: 303 | End: 2024-04-08
Attending: EMERGENCY MEDICINE | Admitting: INTERNAL MEDICINE
Payer: MEDICARE

## 2024-04-07 DIAGNOSIS — R07.9 CHEST PAIN, UNSPECIFIED TYPE: Primary | ICD-10-CM

## 2024-04-07 DIAGNOSIS — R06.02 SHORTNESS OF BREATH: ICD-10-CM

## 2024-04-07 LAB
ALBUMIN SERPL-MCNC: 3.9 G/DL (ref 3.4–5)
ALBUMIN/GLOB SERPL: 1.3 {RATIO} (ref 1.1–2.2)
ALP SERPL-CCNC: 115 U/L (ref 40–129)
ALT SERPL-CCNC: 36 U/L (ref 10–40)
ANION GAP SERPL CALCULATED.3IONS-SCNC: 14 MMOL/L (ref 3–16)
AST SERPL-CCNC: 47 U/L (ref 15–37)
BACTERIA URNS QL MICRO: ABNORMAL /HPF
BASOPHILS # BLD: 0 K/UL (ref 0–0.2)
BASOPHILS NFR BLD: 0.3 %
BILIRUB SERPL-MCNC: <0.2 MG/DL (ref 0–1)
BILIRUB UR QL STRIP.AUTO: NEGATIVE
BUN SERPL-MCNC: 14 MG/DL (ref 7–20)
CALCIUM SERPL-MCNC: 9.5 MG/DL (ref 8.3–10.6)
CHLORIDE SERPL-SCNC: 97 MMOL/L (ref 99–110)
CLARITY UR: CLEAR
CO2 SERPL-SCNC: 23 MMOL/L (ref 21–32)
COLOR UR: YELLOW
CREAT SERPL-MCNC: 0.5 MG/DL (ref 0.6–1.2)
DEPRECATED RDW RBC AUTO: 13.5 % (ref 12.4–15.4)
EOSINOPHIL # BLD: 0.1 K/UL (ref 0–0.6)
EOSINOPHIL NFR BLD: 0.8 %
EPI CELLS #/AREA URNS AUTO: 1 /HPF (ref 0–5)
GFR SERPLBLD CREATININE-BSD FMLA CKD-EPI: >90 ML/MIN/{1.73_M2}
GLUCOSE SERPL-MCNC: 108 MG/DL (ref 70–99)
GLUCOSE UR STRIP.AUTO-MCNC: NEGATIVE MG/DL
HCT VFR BLD AUTO: 39.3 % (ref 36–48)
HGB BLD-MCNC: 13 G/DL (ref 12–16)
HGB UR QL STRIP.AUTO: NEGATIVE
HYALINE CASTS #/AREA URNS AUTO: 0 /LPF (ref 0–8)
KETONES UR STRIP.AUTO-MCNC: NEGATIVE MG/DL
LEUKOCYTE ESTERASE UR QL STRIP.AUTO: ABNORMAL
LYMPHOCYTES # BLD: 1.7 K/UL (ref 1–5.1)
LYMPHOCYTES NFR BLD: 22.8 %
MCH RBC QN AUTO: 29.9 PG (ref 26–34)
MCHC RBC AUTO-ENTMCNC: 33.2 G/DL (ref 31–36)
MCV RBC AUTO: 90.2 FL (ref 80–100)
MONOCYTES # BLD: 0.6 K/UL (ref 0–1.3)
MONOCYTES NFR BLD: 8.2 %
NEUTROPHILS # BLD: 5.1 K/UL (ref 1.7–7.7)
NEUTROPHILS NFR BLD: 67.9 %
NITRITE UR QL STRIP.AUTO: NEGATIVE
PH UR STRIP.AUTO: 7.5 [PH] (ref 5–8)
PLATELET # BLD AUTO: 279 K/UL (ref 135–450)
PMV BLD AUTO: 6.9 FL (ref 5–10.5)
POTASSIUM SERPL-SCNC: 4.9 MMOL/L (ref 3.5–5.1)
PROT SERPL-MCNC: 7 G/DL (ref 6.4–8.2)
PROT UR STRIP.AUTO-MCNC: NEGATIVE MG/DL
RBC # BLD AUTO: 4.36 M/UL (ref 4–5.2)
RBC CLUMPS #/AREA URNS AUTO: 2 /HPF (ref 0–4)
SODIUM SERPL-SCNC: 134 MMOL/L (ref 136–145)
SP GR UR STRIP.AUTO: 1.01 (ref 1–1.03)
TROPONIN, HIGH SENSITIVITY: 11 NG/L (ref 0–14)
UA COMPLETE W REFLEX CULTURE PNL UR: YES
UA DIPSTICK W REFLEX MICRO PNL UR: YES
URN SPEC COLLECT METH UR: ABNORMAL
UROBILINOGEN UR STRIP-ACNC: 1 E.U./DL
WBC # BLD AUTO: 7.6 K/UL (ref 4–11)
WBC #/AREA URNS AUTO: 19 /HPF (ref 0–5)

## 2024-04-07 PROCEDURE — 80053 COMPREHEN METABOLIC PANEL: CPT

## 2024-04-07 PROCEDURE — 84484 ASSAY OF TROPONIN QUANT: CPT

## 2024-04-07 PROCEDURE — 71045 X-RAY EXAM CHEST 1 VIEW: CPT

## 2024-04-07 PROCEDURE — 93005 ELECTROCARDIOGRAM TRACING: CPT | Performed by: EMERGENCY MEDICINE

## 2024-04-07 PROCEDURE — 36415 COLL VENOUS BLD VENIPUNCTURE: CPT

## 2024-04-07 PROCEDURE — 6370000000 HC RX 637 (ALT 250 FOR IP)

## 2024-04-07 PROCEDURE — 81001 URINALYSIS AUTO W/SCOPE: CPT

## 2024-04-07 PROCEDURE — 99285 EMERGENCY DEPT VISIT HI MDM: CPT

## 2024-04-07 PROCEDURE — 87086 URINE CULTURE/COLONY COUNT: CPT

## 2024-04-07 PROCEDURE — 85025 COMPLETE CBC W/AUTO DIFF WBC: CPT

## 2024-04-07 RX ORDER — ASPIRIN 81 MG/1
324 TABLET, CHEWABLE ORAL ONCE
Status: COMPLETED | OUTPATIENT
Start: 2024-04-07 | End: 2024-04-07

## 2024-04-07 RX ORDER — NITROGLYCERIN 0.4 MG/1
0.4 TABLET SUBLINGUAL ONCE
Status: DISCONTINUED | OUTPATIENT
Start: 2024-04-07 | End: 2024-04-08 | Stop reason: HOSPADM

## 2024-04-07 RX ORDER — ASPIRIN 81 MG/1
TABLET, CHEWABLE ORAL
Status: COMPLETED
Start: 2024-04-07 | End: 2024-04-07

## 2024-04-07 RX ADMIN — ASPIRIN 324 MG: 81 TABLET, CHEWABLE ORAL at 22:42

## 2024-04-07 RX ADMIN — ASPIRIN 81 MG 324 MG: 81 TABLET ORAL at 22:42

## 2024-04-08 ENCOUNTER — APPOINTMENT (OUTPATIENT)
Dept: CT IMAGING | Age: 74
DRG: 303 | End: 2024-04-08
Payer: MEDICARE

## 2024-04-08 VITALS
HEIGHT: 60 IN | BODY MASS INDEX: 32.64 KG/M2 | OXYGEN SATURATION: 98 % | TEMPERATURE: 97.6 F | HEART RATE: 81 BPM | SYSTOLIC BLOOD PRESSURE: 156 MMHG | DIASTOLIC BLOOD PRESSURE: 78 MMHG | WEIGHT: 166.23 LBS | RESPIRATION RATE: 16 BRPM

## 2024-04-08 PROBLEM — R07.9 CHEST PAIN WITH HIGH RISK OF ACUTE CORONARY SYNDROME: Status: ACTIVE | Noted: 2024-04-08

## 2024-04-08 LAB
BACTERIA UR CULT: NORMAL
EKG ATRIAL RATE: 83 BPM
EKG DIAGNOSIS: NORMAL
EKG P AXIS: 91 DEGREES
EKG P-R INTERVAL: 136 MS
EKG Q-T INTERVAL: 360 MS
EKG QRS DURATION: 68 MS
EKG QTC CALCULATION (BAZETT): 423 MS
EKG R AXIS: 0 DEGREES
EKG T AXIS: 17 DEGREES
EKG VENTRICULAR RATE: 83 BPM
GLUCOSE BLD-MCNC: 120 MG/DL (ref 70–99)
GLUCOSE BLD-MCNC: 127 MG/DL (ref 70–99)
GLUCOSE BLD-MCNC: 150 MG/DL (ref 70–99)
PERFORMED ON: ABNORMAL
TROPONIN, HIGH SENSITIVITY: 14 NG/L (ref 0–14)
TROPONIN, HIGH SENSITIVITY: 16 NG/L (ref 0–14)
TROPONIN, HIGH SENSITIVITY: 16 NG/L (ref 0–14)

## 2024-04-08 PROCEDURE — 74174 CTA ABD&PLVS W/CONTRAST: CPT

## 2024-04-08 PROCEDURE — 36415 COLL VENOUS BLD VENIPUNCTURE: CPT

## 2024-04-08 PROCEDURE — 2580000003 HC RX 258: Performed by: INTERNAL MEDICINE

## 2024-04-08 PROCEDURE — 93010 ELECTROCARDIOGRAM REPORT: CPT | Performed by: INTERNAL MEDICINE

## 2024-04-08 PROCEDURE — 6360000002 HC RX W HCPCS: Performed by: INTERNAL MEDICINE

## 2024-04-08 PROCEDURE — 84484 ASSAY OF TROPONIN QUANT: CPT

## 2024-04-08 PROCEDURE — 78452 HT MUSCLE IMAGE SPECT MULT: CPT | Performed by: INTERNAL MEDICINE

## 2024-04-08 PROCEDURE — A9502 TC99M TETROFOSMIN: HCPCS | Performed by: STUDENT IN AN ORGANIZED HEALTH CARE EDUCATION/TRAINING PROGRAM

## 2024-04-08 PROCEDURE — 3430000000 HC RX DIAGNOSTIC RADIOPHARMACEUTICAL: Performed by: STUDENT IN AN ORGANIZED HEALTH CARE EDUCATION/TRAINING PROGRAM

## 2024-04-08 PROCEDURE — 93017 CV STRESS TEST TRACING ONLY: CPT | Performed by: INTERNAL MEDICINE

## 2024-04-08 PROCEDURE — 99222 1ST HOSP IP/OBS MODERATE 55: CPT | Performed by: STUDENT IN AN ORGANIZED HEALTH CARE EDUCATION/TRAINING PROGRAM

## 2024-04-08 PROCEDURE — 1200000000 HC SEMI PRIVATE

## 2024-04-08 PROCEDURE — 6370000000 HC RX 637 (ALT 250 FOR IP): Performed by: INTERNAL MEDICINE

## 2024-04-08 PROCEDURE — 6360000004 HC RX CONTRAST MEDICATION: Performed by: EMERGENCY MEDICINE

## 2024-04-08 RX ORDER — POTASSIUM CHLORIDE 7.45 MG/ML
10 INJECTION INTRAVENOUS PRN
Status: DISCONTINUED | OUTPATIENT
Start: 2024-04-08 | End: 2024-04-08 | Stop reason: HOSPADM

## 2024-04-08 RX ORDER — LANOLIN ALCOHOL/MO/W.PET/CERES
1000 CREAM (GRAM) TOPICAL DAILY
Status: DISCONTINUED | OUTPATIENT
Start: 2024-04-08 | End: 2024-04-08 | Stop reason: HOSPADM

## 2024-04-08 RX ORDER — ACETAMINOPHEN 325 MG/1
650 TABLET ORAL EVERY 6 HOURS PRN
Status: DISCONTINUED | OUTPATIENT
Start: 2024-04-08 | End: 2024-04-08 | Stop reason: HOSPADM

## 2024-04-08 RX ORDER — MECLIZINE HCL 12.5 MG/1
25 TABLET ORAL DAILY
Status: DISCONTINUED | OUTPATIENT
Start: 2024-04-08 | End: 2024-04-08 | Stop reason: HOSPADM

## 2024-04-08 RX ORDER — METOPROLOL TARTRATE 50 MG/1
50 TABLET, FILM COATED ORAL 3 TIMES DAILY
Status: DISCONTINUED | OUTPATIENT
Start: 2024-04-08 | End: 2024-04-08 | Stop reason: HOSPADM

## 2024-04-08 RX ORDER — POLYETHYLENE GLYCOL 3350 17 G/17G
17 POWDER, FOR SOLUTION ORAL DAILY PRN
Status: DISCONTINUED | OUTPATIENT
Start: 2024-04-08 | End: 2024-04-08 | Stop reason: HOSPADM

## 2024-04-08 RX ORDER — REGADENOSON 0.08 MG/ML
0.4 INJECTION, SOLUTION INTRAVENOUS
Status: COMPLETED | OUTPATIENT
Start: 2024-04-08 | End: 2024-04-08

## 2024-04-08 RX ORDER — SODIUM CHLORIDE 9 MG/ML
INJECTION, SOLUTION INTRAVENOUS PRN
Status: DISCONTINUED | OUTPATIENT
Start: 2024-04-08 | End: 2024-04-08 | Stop reason: HOSPADM

## 2024-04-08 RX ORDER — POTASSIUM CHLORIDE 20 MEQ/1
40 TABLET, EXTENDED RELEASE ORAL PRN
Status: DISCONTINUED | OUTPATIENT
Start: 2024-04-08 | End: 2024-04-08 | Stop reason: HOSPADM

## 2024-04-08 RX ORDER — ONDANSETRON 4 MG/1
4 TABLET, ORALLY DISINTEGRATING ORAL EVERY 8 HOURS PRN
Status: DISCONTINUED | OUTPATIENT
Start: 2024-04-08 | End: 2024-04-08 | Stop reason: HOSPADM

## 2024-04-08 RX ORDER — ACETAMINOPHEN 650 MG/1
650 SUPPOSITORY RECTAL EVERY 6 HOURS PRN
Status: DISCONTINUED | OUTPATIENT
Start: 2024-04-08 | End: 2024-04-08 | Stop reason: HOSPADM

## 2024-04-08 RX ORDER — PANTOPRAZOLE SODIUM 40 MG/1
40 TABLET, DELAYED RELEASE ORAL
Status: DISCONTINUED | OUTPATIENT
Start: 2024-04-08 | End: 2024-04-08 | Stop reason: HOSPADM

## 2024-04-08 RX ORDER — SODIUM CHLORIDE 0.9 % (FLUSH) 0.9 %
5-40 SYRINGE (ML) INJECTION PRN
Status: DISCONTINUED | OUTPATIENT
Start: 2024-04-08 | End: 2024-04-08 | Stop reason: HOSPADM

## 2024-04-08 RX ORDER — PRAVASTATIN SODIUM 20 MG
20 TABLET ORAL NIGHTLY
Status: DISCONTINUED | OUTPATIENT
Start: 2024-04-08 | End: 2024-04-08 | Stop reason: HOSPADM

## 2024-04-08 RX ORDER — ONDANSETRON 2 MG/ML
4 INJECTION INTRAMUSCULAR; INTRAVENOUS EVERY 6 HOURS PRN
Status: DISCONTINUED | OUTPATIENT
Start: 2024-04-08 | End: 2024-04-08 | Stop reason: HOSPADM

## 2024-04-08 RX ORDER — REGADENOSON 0.08 MG/ML
0.4 INJECTION, SOLUTION INTRAVENOUS
OUTPATIENT
Start: 2024-04-08

## 2024-04-08 RX ORDER — LANOLIN ALCOHOL/MO/W.PET/CERES
400 CREAM (GRAM) TOPICAL DAILY
Status: DISCONTINUED | OUTPATIENT
Start: 2024-04-08 | End: 2024-04-08 | Stop reason: HOSPADM

## 2024-04-08 RX ORDER — ACETAMINOPHEN 500 MG
1000 TABLET ORAL ONCE
Status: DISCONTINUED | OUTPATIENT
Start: 2024-04-08 | End: 2024-04-08 | Stop reason: HOSPADM

## 2024-04-08 RX ORDER — FERROUS SULFATE 325(65) MG
325 TABLET ORAL
Status: DISCONTINUED | OUTPATIENT
Start: 2024-04-08 | End: 2024-04-08 | Stop reason: HOSPADM

## 2024-04-08 RX ORDER — ENOXAPARIN SODIUM 100 MG/ML
40 INJECTION SUBCUTANEOUS DAILY
Status: DISCONTINUED | OUTPATIENT
Start: 2024-04-08 | End: 2024-04-08

## 2024-04-08 RX ORDER — M-VIT,TX,IRON,MINS/CALC/FOLIC 27MG-0.4MG
1 TABLET ORAL DAILY
Status: DISCONTINUED | OUTPATIENT
Start: 2024-04-08 | End: 2024-04-08 | Stop reason: HOSPADM

## 2024-04-08 RX ORDER — MAGNESIUM SULFATE IN WATER 40 MG/ML
2000 INJECTION, SOLUTION INTRAVENOUS PRN
Status: DISCONTINUED | OUTPATIENT
Start: 2024-04-08 | End: 2024-04-08 | Stop reason: HOSPADM

## 2024-04-08 RX ORDER — SODIUM CHLORIDE 0.9 % (FLUSH) 0.9 %
5-40 SYRINGE (ML) INJECTION EVERY 12 HOURS SCHEDULED
Status: DISCONTINUED | OUTPATIENT
Start: 2024-04-08 | End: 2024-04-08 | Stop reason: HOSPADM

## 2024-04-08 RX ORDER — ASPIRIN 81 MG/1
81 TABLET, CHEWABLE ORAL DAILY
Status: DISCONTINUED | OUTPATIENT
Start: 2024-04-08 | End: 2024-04-08 | Stop reason: HOSPADM

## 2024-04-08 RX ORDER — PHENOL 1.4 %
1 AEROSOL, SPRAY (ML) MUCOUS MEMBRANE 2 TIMES DAILY
Qty: 30 TABLET | Refills: 3 | COMMUNITY
Start: 2024-04-08

## 2024-04-08 RX ORDER — ASPIRIN 81 MG/1
81 TABLET, CHEWABLE ORAL DAILY
Status: DISCONTINUED | OUTPATIENT
Start: 2024-04-09 | End: 2024-04-08

## 2024-04-08 RX ORDER — LISINOPRIL 20 MG/1
20 TABLET ORAL 2 TIMES DAILY
Status: DISCONTINUED | OUTPATIENT
Start: 2024-04-08 | End: 2024-04-08 | Stop reason: HOSPADM

## 2024-04-08 RX ORDER — ATORVASTATIN CALCIUM 40 MG/1
40 TABLET, FILM COATED ORAL NIGHTLY
Status: DISCONTINUED | OUTPATIENT
Start: 2024-04-08 | End: 2024-04-08

## 2024-04-08 RX ADMIN — CYANOCOBALAMIN TAB 1000 MCG 1000 MCG: 1000 TAB at 08:44

## 2024-04-08 RX ADMIN — REGADENOSON 0.4 MG: 0.08 INJECTION, SOLUTION INTRAVENOUS at 11:04

## 2024-04-08 RX ADMIN — FERROUS SULFATE TAB 325 MG (65 MG ELEMENTAL FE) 325 MG: 325 (65 FE) TAB at 08:44

## 2024-04-08 RX ADMIN — METOPROLOL TARTRATE 50 MG: 50 TABLET, FILM COATED ORAL at 15:04

## 2024-04-08 RX ADMIN — PANTOPRAZOLE SODIUM 40 MG: 40 TABLET, DELAYED RELEASE ORAL at 08:44

## 2024-04-08 RX ADMIN — IOPAMIDOL 75 ML: 755 INJECTION, SOLUTION INTRAVENOUS at 01:38

## 2024-04-08 RX ADMIN — ACETAMINOPHEN 650 MG: 325 TABLET ORAL at 16:35

## 2024-04-08 RX ADMIN — TETROFOSMIN 30 MILLICURIE: 1.38 INJECTION, POWDER, LYOPHILIZED, FOR SOLUTION INTRAVENOUS at 11:08

## 2024-04-08 RX ADMIN — Medication 400 MG: at 08:44

## 2024-04-08 RX ADMIN — Medication 1 TABLET: at 08:44

## 2024-04-08 RX ADMIN — MECLIZINE 25 MG: 12.5 TABLET ORAL at 08:44

## 2024-04-08 RX ADMIN — LISINOPRIL 20 MG: 20 TABLET ORAL at 08:44

## 2024-04-08 RX ADMIN — SODIUM CHLORIDE, PRESERVATIVE FREE 10 ML: 5 INJECTION INTRAVENOUS at 08:46

## 2024-04-08 RX ADMIN — TETROFOSMIN 10 MILLICURIE: 1.38 INJECTION, POWDER, LYOPHILIZED, FOR SOLUTION INTRAVENOUS at 10:17

## 2024-04-08 RX ADMIN — ASPIRIN 81 MG 81 MG: 81 TABLET ORAL at 08:44

## 2024-04-08 ASSESSMENT — PAIN DESCRIPTION - LOCATION
LOCATION: BACK
LOCATION: BACK

## 2024-04-08 ASSESSMENT — PAIN DESCRIPTION - ORIENTATION
ORIENTATION: LOWER
ORIENTATION: MID

## 2024-04-08 ASSESSMENT — PAIN DESCRIPTION - DESCRIPTORS
DESCRIPTORS: ACHING
DESCRIPTORS: ACHING

## 2024-04-08 ASSESSMENT — HEART SCORE: ECG: NORMAL

## 2024-04-08 ASSESSMENT — PAIN SCALES - GENERAL
PAINLEVEL_OUTOF10: 6
PAINLEVEL_OUTOF10: 3

## 2024-04-08 NOTE — PLAN OF CARE
Problem: Discharge Planning  Goal: Discharge to home or other facility with appropriate resources  4/8/2024 1752 by Irma Geiger RN  Outcome: Completed  4/8/2024 0913 by Irma Geiger RN  Outcome: Progressing     Problem: Pain  Goal: Verbalizes/displays adequate comfort level or baseline comfort level  4/8/2024 1752 by Irma Geiger RN  Outcome: Completed  4/8/2024 0913 by Irma Geiger RN  Outcome: Progressing     Problem: Safety - Adult  Goal: Free from fall injury  4/8/2024 1752 by Irma Geiger RN  Outcome: Completed  4/8/2024 0913 by Irma Geiger RN  Outcome: Progressing     Problem: ABCDS Injury Assessment  Goal: Absence of physical injury  4/8/2024 1752 by Irma Geiger RN  Outcome: Completed  4/8/2024 0913 by Irma Geiger RN  Outcome: Progressing

## 2024-04-08 NOTE — H&P
V2.0  History and Physical      Name:  Daxa Josue /Age/Sex: 1950  (73 y.o. female)   MRN & CSN:  [id number] & [id number] Encounter Date/Time: 2024 6:19 AM EDT   Location:  [facility] PCP: Emanuel S[facility] Day: 2    Assessment and Plan:   Daxa Josue is a 73 y.o. female with a pmh of hypertension, hyperlipidemia, paroxysmal A-fib, intermittent sinus pauses who presents with Chest pain with high risk of acute coronary syndrome    Hospital Problems             Last Modified POA    * (Principal) Chest pain with high risk of acute coronary syndrome 2024 Yes   Coronary artery disease status post CABG, status post stents  Hypertension  Hyperlipidemia  A-fib status post ablation  Intermittent sinus pauses      Plan:  Admit to medicine service with telemetry  Cardiology consult for further restratification  Repeat 1 more troponin  Resume home medications which includes lisinopril, magnesium oxide, meclizine as needed, metformin, metoprolol, Protonix, pravastatin, and vitamin B-12  Patient is full code and is being admitted in stable state.    Disposition:   Current Living situation: Lives in the community with family  Expected Disposition: Home  Estimated D/C: 2 days    Diet ADULT DIET; Regular; Low Fat/Low Chol/High Fiber/EDSON; Low Sodium (2 gm); No Caffeine   DVT Prophylaxis [] Lovenox, []  Heparin, [] SCDs, [] Ambulation,  [x] Eliquis, [] Xarelto, [] Coumadin   Code Status Full Code   Surrogate Decision Maker/ POA      Personally reviewed Lab Studies and Imaging     Discussed management of the case with patient and addressed any concerns that she had     History from:     patient    History of Present Illness:     Chief Complaint: Chest pain  Daxa Josue is a 73 y.o. female with pmh of coronary artery disease status post CABG in  status post stent placement since then intermittent sinus pauses awaiting pacemaker, A-fib status post ablation on Eliquis who presented with chest

## 2024-04-08 NOTE — ACP (ADVANCE CARE PLANNING)
Advance Care Planning     Advance Care Planning Inpatient Note  Spiritual Care Department    Today's Date: 4/8/2024  Unit: [facility]    Received request from IDT Member,Jailyn Cruz RN.  Upon review of chart and communication with care team, patient's decision making abilities are not in question.. Patient was/were present in the room during visit.    Goals of ACP Conversation:  Discuss advance care planning documents  Facilitate a discussion related to patient's goals of care as they align with the patient's values and beliefs.    Health Care Decision Makers:     No healthcare decision makers have been documented.  Click here to complete HealthCare Decision Makers including selection of the Healthcare Decision Maker Relationship (ie \"Primary\")  Summary:  No Decision Maker named by patient at this time    Advance Care Planning Documents (Patient Wishes):  None     Assessment:  Spiritual Care consult to facilitate completion of Health Care Power of  and Living Will. Pt was awake and alert. Pt was given blank copies of AD documents to review/complete when appropriate. Pt will notify the nurse to contact Spiritual Care Services to witness pt sign completed POA/LW documents.  Provided support through active listening, affirmed feelings, thoughts, concerns conversation, and blessing. Pt expressed hope and gratitude.    Interventions:  Provided education on documents for clarity and greater understanding  Discussed and provided education on state decision maker hierarchy  Encouraged ongoing ACP conversation with future decision makers and loved ones  Reviewed but did not complete ACP document    Care Preferences Communicated:   No    Outcomes/Plan:  ACP Discussion: Postponed. Pt will notify the nurse when appropriate.    Electronically signed by Chaplain Yovani on 4/8/2024 at 9:51 AM

## 2024-04-08 NOTE — PROGRESS NOTES
Data- discharge order received, pt verbalized agreement to discharge, disposition to previous residence, no needs for HHC/DME.     Action- discharge instructions prepared and given to patient, pt verbalized understanding. Medication information packet given r/t NEW and/or CHANGED prescriptions emphasizing name/purpose/side effects, pt verbalized understanding. Discharge instruction summary: Diet- cardiac, Activity- as tolerated, Primary Care Physician as follows: Emanuel Sanches Bret [phone] f/u appointment, immunizations reviewed and updated. Inpatient surgical procedure precautions reviewed:  CHF Education reviewed. Pt/ Family has had a total of 60 minutes CHF education this admission encounter.     1. WEIGHT: Admit Weight - Scale: 76.2 kg (168 lb) (04/07/24 2216)        Today  Weight - Scale: 75.4 kg (166 lb 3.6 oz) (04/08/24 0644)       2. O2 SAT.: SpO2: 98 % (04/08/24 1635)    Response- Pt belongings gathered, IV removed. Disposition is home (no HHC/DME needs), transported with belongings, taken to lobby via w/c, no complications.

## 2024-04-08 NOTE — FLOWSHEET NOTE
04/08/24 0652   Vital Signs   Temp 98.3 °F (36.8 °C)   Temp Source Temporal   Pulse 77   Heart Rate Source Telemetry   Respirations 16   BP (!) 152/68   MAP (Calculated) 96   MAP (mmHg) 94   BP Location Left upper arm   BP Method Automatic   Patient Position Semi chaparrowlers   Pain Assessment   Pain Assessment 0-10   Pain Level 6   Patient's Stated Pain Goal 0 - No pain   Pain Location Back   Pain Orientation Lower   Pain Descriptors Aching   Oxygen Therapy   SpO2 94 %   O2 Device None (Room air)     Pt admitted to room 5916. Pt is alert and oriented x4. Admission documentation and assessments completed. Pt is NPO awaiting cardiology consult. Telemetry pack applied. Fall precautions in place, bed alarm on, nonskid foot wear applied, bed in lowest position, and call light within reach. Will continue to monitor.

## 2024-04-08 NOTE — DISCHARGE SUMMARY
into back, associated with shortness of breath. Pain onset was while she was watching tv. Pain was resolved by time she arrived in ER. There was no associated abdominal pain, nausea. CTA chest/abd with no acute abnormalities. Troponin mildly elevated ut trend flat (11-18). There were no acute changes on EKG. Patient was evaluated by cardiology and underwent stress test which showed no ischemia. She has been ambulatory in room with no further episodes of chest pain or shortness of breath. O2 sats high 90s room air.     UA with moderate leukocytes (19), patient denies urinary symptoms. Urine culture < 50K mixed skin/urogenital jillian.    Patient reports she feels good and would like to go home. She has had no further episodes of chest pain or shortness of breath. Reviewed DC plans, follow up and medications. Expresses understanding. Questions answered.      Consults.  IP CONSULT TO HOSPITALIST  IP CONSULT TO CARDIOLOGY  IP CONSULT TO SPIRITUAL SERVICES    Physical examination on discharge day.   BP (!) 156/78   Pulse 81   Temp 97.6 °F (36.4 °C) (Temporal)   Resp 16   Ht 1.524 m (5')   Wt 75.4 kg (166 lb 3.6 oz)   SpO2 98%   BMI 32.46 kg/m²   General appearance.  Alert. Looks comfortable.  HEENT. Sclera clear. Moist mucus membranes.  Cardiovascular. Regular rate and rhythm, normal S1, S2. No murmur.   Respiratory. Not using accessory muscles.Clear to auscultation bilaterally, no wheeze.  Gastrointestinal. Abdomen soft, non-tender, not distended, normal bowel sounds  Neurology. Facial symmetry. No speech deficits. Moving all extremities equally.  Extremities. No edema in lower extremities.  Skin. Warm, dry, normal turgor    Condition at time of discharge stable    Medication instructions provided to patient at discharge.     Medication List        CHANGE how you take these medications      metFORMIN 850 MG tablet  Commonly known as: GLUCOPHAGE  Start taking on: April 11, 2024  What changed: These instructions

## 2024-04-08 NOTE — ED NOTES
following components:    Leukocyte Esterase, Urine MODERATE (*)     All other components within normal limits   MICROSCOPIC URINALYSIS - Abnormal; Notable for the following components:    WBC, UA 19 (*)     All other components within normal limits     Critical values: no     Abnormal Assessment Findings:     Background  History:   Past Medical History:   Diagnosis Date    Acute CVA (cerebrovascular accident) (HCC) 12/11/2021    Arthritis     CAD (coronary artery disease)     Diabetes mellitus (HCC)     Hyperlipidemia     Hypertension        Assessment    Vitals/MEWS:        Vitals:    04/07/24 2216 04/08/24 0338   BP: (!) 153/81    Pulse: 87 76   Resp: 17 14   Temp: 97.9 °F (36.6 °C)    TempSrc: Oral    SpO2: 98% 93%   Weight: 76.2 kg (168 lb)      FiO2 (%):   O2 Flow Rate:      Cardiac Rhythm:    Pain Assessment:  [x] Verbal [] Sherman Mast Scale  Pain Scale:    Last documented pain score (0-10 scale)    Last documented pain medication administered:   Mental Status: oriented, alert, and coherent  Orientation Level:    NIH Score:    C-SSRS: Risk of Suicide: No Risk  Bedside swallow:    Migdalia Coma Scale (GCS):    Active LDA's:   Peripheral IV 04/08/24 Right Forearm (Active)     PO Status: Regular  Pertinent or High Risk Medications/Drips: no   If Yes, please provide details:   Pending Blood Product Administration: no       You may also review the ED PT Care Timeline found under the Summary Nursing Index tab.    Recommendation    Pending orders   Plan for Discharge (if known):   Additional Comments: ambulates    If any further questions, please call Sending RN at ED    Electronically signed by: Electronically signed by FAN SORIA RN on 4/8/2024 at 5:36 AM

## 2024-04-08 NOTE — CONSULTS
Normal left ventricle size, wall thickness, and systolic function with an   estimated ejection fraction of 50-55%. No regional wall motion abnormalities are seen.    Mild mitral regurgitation is present.   Moderate tricuspid regurgitation with eccentric jet.     Echo 8/21/2023   Left ventricular systolic function is hyperdynamic with ejection fraction   estimated at 65-70 %.   No regional wall motion abnormalities are noted.   Normal left ventricular wall thickness.   Aortic valve leaflets appear thickened.   Mitral valve leaflets appear mildly thickened.     NM Myocardial Stress Test 8/19/2023   Summary   Normal LV size and systolic function.   There is normal isotope uptake at stress and rest.   There is no evidence of myocardial ischemia or scar.   Overall, findings represent a low risk scan.     CTA Head/Neck W Contrast  CAROTID ARTERIES:  Patent and of normal caliber.  Moderate bilateral carotid  bulb plaque formation. No dissection, arterial injury, or hemodynamically  significant carotid stenosis by NASCET criteria as the degree of luminal  narrowing measures less than 50% bilaterally.     VL Carotid Bilateral 10/17/2017   -The right internal carotid artery appears to have a 50-69% diameter   reducing stenosis based on velocity criteria.   -The left internal carotid artery appears to have a <50% diameter reducing stenosis based on velocity criteria.  - Recommend f/u study in 6 months     [facility] 2/2/2014  LVEF 80%  LM-20% proximal  LAD-100%  CX-100% line   RCA-widely patent previously placed stent, 40% proximal  LIMA to the LAD-widely patent  Right to left collaterals to the circumflex     LHC at [facility]/11/1993 with Dr. Molina and Dr. Sadler  Successful balloon angioplasty of a Type A, 90%, proximal Left Anterior Descending Artery Stenosis       ASSESSMENT/PLAN:  CP - trops borderline 14-16-16 but in setting of diffuse CAD   Nuc MPI pending  Has  of Lcx if small area of ischemia in that

## 2024-04-08 NOTE — ED PROVIDER NOTES
Current packs/day: 1.00     Average packs/day: 1 pack/day for 30.0 years (30.0 ttl pk-yrs)     Types: Cigarettes    Smokeless tobacco: Never    Tobacco comments:     started smoking again   Vaping Use    Vaping Use: Not on file   Substance and Sexual Activity    Alcohol use: No     Alcohol/week: 0.0 standard drinks of alcohol    Drug use: No    Sexual activity: Not on file   Other Topics Concern    Not on file   Social History Narrative    Not on file     Social Determinants of Health     Financial Resource Strain: Not on file   Food Insecurity: Not on file (11/9/2023)   Transportation Needs: No Transportation Needs (11/17/2023)    Transportation Problems ([facility] HRSN)     In the past 12 months, has lack of reliable transportation kept you from medical appointments, meetings, work or from getting things needed for daily living?: Not on file   Physical Activity: Not on file   Stress: Not on file   Social Connections: Not on file   Intimate Partner Violence: Not on file   Housing Stability: Low Risk  (11/9/2023)    Housing Stability Vital Sign     Unable to Pay for Housing in the Last Year: No     Number of Places Lived in the Last Year: 1     Unstable Housing in the Last Year: No     Current Facility-Administered Medications   Medication Dose Route Frequency Provider Last Rate Last Admin    nitroGLYCERIN (NITROSTAT) SL tablet 0.4 mg  0.4 mg SubLINGual Once Heike Cason MD         Current Outpatient Medications   Medication Sig Dispense Refill    metoprolol (LOPRESSOR) 100 MG tablet Take 0.5 tablets by mouth 3 times daily Taking 100 mg in the morning and 50 mg at night 90 tablet 0    pravastatin (PRAVACHOL) 20 MG tablet Take 1 tablet by mouth nightly 90 tablet 3    pantoprazole (PROTONIX) 40 MG tablet Take 1 tablet by mouth every morning (before breakfast) 30 tablet 5    lisinopril (PRINIVIL;ZESTRIL) 20 MG tablet Take 1 tablet by mouth 2 times daily      meclizine (ANTIVERT) 25 MG tablet Take 1 tablet by mouth

## 2024-04-22 RX ORDER — METOPROLOL TARTRATE 100 MG/1
TABLET ORAL
Qty: 135 TABLET | Refills: 1 | Status: SHIPPED | OUTPATIENT
Start: 2024-04-22

## 2024-04-22 NOTE — TELEPHONE ENCOUNTER
Last OV: 02/01/2024   Fox Ellis  Last Labs:EKG-04/08/2024    Next OV:05/03/2024  Fox Ellis   Last Refill: Metoprolol-02/01/2024  Fox Ellis

## 2024-05-13 RX ORDER — PANTOPRAZOLE SODIUM 40 MG/1
40 TABLET, DELAYED RELEASE ORAL
Qty: 90 TABLET | Refills: 1 | OUTPATIENT
Start: 2024-05-13

## 2024-05-13 NOTE — TELEPHONE ENCOUNTER
Requested Prescriptions     Pending Prescriptions Disp Refills    pantoprazole (PROTONIX) 40 MG tablet [Pharmacy Med Name: PANTOPRAZOLE SOD DR 40 MG TAB] 90 tablet 1     Sig: TAKE 1 TABLET BY MOUTH EVERY DAY BEFORE BREAKFAST        [id number]      Last OV:  2/1/2024 NPSR    Next OV: 6/11/2024 BGC    Last Labs: 04/07/2024 CBC    Last Filled: 11/09/2023 RMM

## 2024-05-31 NOTE — PROGRESS NOTES
[facility]    6/11/2024    Referring Provider: Emanuel Sanches DO    HISTORY:  Daxa Josue is a 73 y.o. female presenting for her routine 6 month follow up regarding CAD s/p CABG (LIMA-LAD 1993) and PCI (PUNEET to RCA 2001), HTN, and HLD. She follows with Dr. De Los Santos for a history of afib s/p ablation 11/9 and sinus pauses. She also has a history of carotid artery stenosis, CVA, T2DM, and tobacco abuse. She did have a recent hospitalization in early April for complaints of chest pressure that lasted for approximately five minutes. She completed a stress test that did not show evidence of ischemia.     Today she arrives alone and is using her walker. Denies chest pain/pressure/squeezing/tightness, dizziness/lightheadedness, shortness of breath/dyspnea on exertion. She has been doing well since her last hospitalization and stress test. She has no limitations to daily activity at her house from a cardiac standpoint, but does have issues with her left leg. She typically just stays in the house. She has weakness in her BLE. No complaints or concerns otherwise.     Still smoking 3-4 cigarettes daily, but hopes to be down to 1-2 by next appt.       REVIEW OF SYSTEMS:  A complete review of systems was reviewed and is negative except as noted in the history of present illness.    Prior to Visit Medications    Medication Sig Taking? Authorizing Provider   metoprolol (LOPRESSOR) 100 MG tablet TAKE 100 MG IN THE AM AND 50 MG IN THE PM Yes Elliott Ellis APRN - CNP   metFORMIN (GLUCOPHAGE) 850 MG tablet Take 1 tablet by mouth 2 times daily (with meals) Yes Marissa Chavarria APRN - CNP   calcium carbonate 600 MG TABS tablet Take 1 tablet by mouth 2 times daily Yes Marissa Chavarria APRN - CNP   pravastatin (PRAVACHOL) 20 MG tablet Take 1 tablet by mouth nightly Yes Elliott Ellis APRN - CNP   pantoprazole (PROTONIX) 40 MG tablet Take 1 tablet by mouth every morning (before breakfast) Yes Filiberto Rodrigez MD

## 2024-06-11 ENCOUNTER — OFFICE VISIT (OUTPATIENT)
Dept: CARDIOLOGY CLINIC | Age: 74
End: 2024-06-11
Payer: MEDICARE

## 2024-06-11 VITALS
SYSTOLIC BLOOD PRESSURE: 160 MMHG | OXYGEN SATURATION: 97 % | HEART RATE: 71 BPM | WEIGHT: 164 LBS | DIASTOLIC BLOOD PRESSURE: 98 MMHG | BODY MASS INDEX: 32.03 KG/M2

## 2024-06-11 DIAGNOSIS — I25.10 CORONARY ARTERY DISEASE DUE TO LIPID RICH PLAQUE: Primary | ICD-10-CM

## 2024-06-11 DIAGNOSIS — E78.5 DYSLIPIDEMIA: ICD-10-CM

## 2024-06-11 DIAGNOSIS — E11.9 TYPE 2 DIABETES MELLITUS WITHOUT COMPLICATION, WITHOUT LONG-TERM CURRENT USE OF INSULIN (HCC): ICD-10-CM

## 2024-06-11 DIAGNOSIS — I25.83 CORONARY ARTERY DISEASE DUE TO LIPID RICH PLAQUE: Primary | ICD-10-CM

## 2024-06-11 DIAGNOSIS — I63.9 ACUTE CVA (CEREBROVASCULAR ACCIDENT) (HCC): ICD-10-CM

## 2024-06-11 DIAGNOSIS — I48.0 PAF (PAROXYSMAL ATRIAL FIBRILLATION) (HCC): ICD-10-CM

## 2024-06-11 DIAGNOSIS — I10 HTN (HYPERTENSION), BENIGN: ICD-10-CM

## 2024-06-11 DIAGNOSIS — I65.23 BILATERAL CAROTID ARTERY STENOSIS: ICD-10-CM

## 2024-06-11 DIAGNOSIS — Z72.0 TOBACCO ABUSE: ICD-10-CM

## 2024-06-11 PROCEDURE — G8417 CALC BMI ABV UP PARAM F/U: HCPCS | Performed by: STUDENT IN AN ORGANIZED HEALTH CARE EDUCATION/TRAINING PROGRAM

## 2024-06-11 PROCEDURE — 99214 OFFICE O/P EST MOD 30 MIN: CPT | Performed by: STUDENT IN AN ORGANIZED HEALTH CARE EDUCATION/TRAINING PROGRAM

## 2024-06-11 PROCEDURE — 1123F ACP DISCUSS/DSCN MKR DOCD: CPT | Performed by: STUDENT IN AN ORGANIZED HEALTH CARE EDUCATION/TRAINING PROGRAM

## 2024-06-11 PROCEDURE — 3080F DIAST BP >= 90 MM HG: CPT | Performed by: STUDENT IN AN ORGANIZED HEALTH CARE EDUCATION/TRAINING PROGRAM

## 2024-06-11 PROCEDURE — 3077F SYST BP >= 140 MM HG: CPT | Performed by: STUDENT IN AN ORGANIZED HEALTH CARE EDUCATION/TRAINING PROGRAM

## 2024-06-11 PROCEDURE — 4004F PT TOBACCO SCREEN RCVD TLK: CPT | Performed by: STUDENT IN AN ORGANIZED HEALTH CARE EDUCATION/TRAINING PROGRAM

## 2024-06-11 PROCEDURE — 2022F DILAT RTA XM EVC RTNOPTHY: CPT | Performed by: STUDENT IN AN ORGANIZED HEALTH CARE EDUCATION/TRAINING PROGRAM

## 2024-06-11 PROCEDURE — 3017F COLORECTAL CA SCREEN DOC REV: CPT | Performed by: STUDENT IN AN ORGANIZED HEALTH CARE EDUCATION/TRAINING PROGRAM

## 2024-06-11 PROCEDURE — 1090F PRES/ABSN URINE INCON ASSESS: CPT | Performed by: STUDENT IN AN ORGANIZED HEALTH CARE EDUCATION/TRAINING PROGRAM

## 2024-06-11 PROCEDURE — G8427 DOCREV CUR MEDS BY ELIG CLIN: HCPCS | Performed by: STUDENT IN AN ORGANIZED HEALTH CARE EDUCATION/TRAINING PROGRAM

## 2024-06-11 PROCEDURE — 3046F HEMOGLOBIN A1C LEVEL >9.0%: CPT | Performed by: STUDENT IN AN ORGANIZED HEALTH CARE EDUCATION/TRAINING PROGRAM

## 2024-06-11 PROCEDURE — G8400 PT W/DXA NO RESULTS DOC: HCPCS | Performed by: STUDENT IN AN ORGANIZED HEALTH CARE EDUCATION/TRAINING PROGRAM

## 2024-08-12 RX ORDER — PANTOPRAZOLE SODIUM 40 MG/1
40 TABLET, DELAYED RELEASE ORAL
Qty: 90 TABLET | Refills: 1 | OUTPATIENT
Start: 2024-08-12

## 2024-08-12 NOTE — TELEPHONE ENCOUNTER
Received refill request for  pantoprazole (PROTONIX) 40 MG   from [facility] pharmacy.     Last OV: 6/11/24    Next OV: 12/10/24    Last Labs:     Last Filled: 11/9/23

## 2024-10-01 RX ORDER — METOPROLOL TARTRATE 100 MG
TABLET ORAL
Qty: 135 TABLET | Refills: 1 | Status: SHIPPED | OUTPATIENT
Start: 2024-10-01

## 2024-10-01 NOTE — TELEPHONE ENCOUNTER
Last ov:24 BGC  Next ov:12/10/24 BGC  Last EK24  Last labs:24  Last filled:   Disp Refills Start End    metoprolol (LOPRESSOR) 100 MG tablet 135 tablet 1 2024 --    Sig: TAKE 100 MG IN THE AM AND 50 MG IN THE PM    Sent to pharmacy as: Metoprolol Tartrate 100 MG Oral Tablet (LOPRESSOR)    E-Prescribing Status: Receipt confirmed by pharmacy (2024  8:46 AM EDT)

## 2024-11-08 DIAGNOSIS — E78.5 DYSLIPIDEMIA: Primary | ICD-10-CM

## 2024-11-08 RX ORDER — PRAVASTATIN SODIUM 20 MG
20 TABLET ORAL NIGHTLY
Qty: 30 TABLET | Refills: 0 | Status: SHIPPED | OUTPATIENT
Start: 2024-11-08

## 2024-11-08 RX ORDER — PANTOPRAZOLE SODIUM 40 MG/1
40 TABLET, DELAYED RELEASE ORAL
Qty: 30 TABLET | Refills: 0 | Status: SHIPPED | OUTPATIENT
Start: 2024-11-08

## 2024-11-08 NOTE — TELEPHONE ENCOUNTER
Received refill request for  pantoprazole (PROTONIX) 40 MG tablet  from [facility] pharmacy.     Last OV: 6/11/24    Next OV: 12/10/24    Last Labs:     Last Filled: 11/9/23

## 2024-11-08 NOTE — TELEPHONE ENCOUNTER
Received refill request for  pravastatin (PRAVACHOL) 20 MG tablet  from [facility] pharmacy.     Last OV: 6/11/24    Next OV: 12/10/24    Last Labs:     Last Filled: 11/24/23

## 2024-11-21 PROBLEM — Z95.1 S/P CABG (CORONARY ARTERY BYPASS GRAFT): Status: ACTIVE | Noted: 2024-11-21

## 2024-11-21 PROBLEM — Z95.5 S/P CORONARY ARTERY STENT PLACEMENT: Status: ACTIVE | Noted: 2024-11-21

## 2024-11-21 NOTE — PROGRESS NOTES
[facility]    12/10/2024    Referring Provider: Emanuel Sanches DO    HISTORY:  Daxa Josue is a 74 y.o. female presenting for her routine 6 month follow up regarding CAD s/p CABG (LIMA-LAD 1993) and PCI (PUNEET to RCA 2001), HTN, and HLD. She follows with Dr. De Los Santos for a history of afib s/p ablation 11/9/2023 and sinus pauses. She also has a history of carotid artery stenosis, CVA, T2DM, and tobacco abuse. She was hospitalized in April, 2024 with complaints of chest pressure lasting for approximately five minutes. She completed a stress test which was negative for ischemia.     Today she arrives alone. She is using her walker for mobility when she is out of the house. She only uses her walker when she first wakes up in the morning. As the day progresses, she is able to do her daily activities without it. She is able to cook and do her normal activities without any cardiac limitations.  Denies chest pain/pressure/squeezing/tightness, dizziness/lightheadedness, shortness of breath/dyspnea on exertion. She has a PT, OT, and home health nurse who have been helping her out.     BP much better at home, when checked by home health its usually 120s, 130s.       REVIEW OF SYSTEMS:  A complete review of systems was reviewed and is negative except as noted in the history of present illness.    Prior to Visit Medications    Medication Sig Taking? Authorizing Provider   pravastatin (PRAVACHOL) 20 MG tablet TAKE 1 TABLET BY MOUTH EVERY DAY AT NIGHT Yes Elliott Ellis APRN - CNP   metoprolol (LOPRESSOR) 100 MG tablet TAKE 1 TAB (100 MG) IN THE AM AND 1/2 TAB (50 MG) IN THE PM Yes Elliott Ellis APRN - CNP   metFORMIN (GLUCOPHAGE) 850 MG tablet Take 1 tablet by mouth 2 times daily (with meals) Yes Marissa Chavarria APRN - CNP   calcium carbonate 600 MG TABS tablet Take 1 tablet by mouth 2 times daily Yes Marissa Chavarria APRN - CNP   lisinopril (PRINIVIL;ZESTRIL) 20 MG tablet Take 1 tablet by mouth 2 times

## 2024-11-21 NOTE — PATIENT INSTRUCTIONS
Thank you for coming to see me today   Continue to be active   No changes to your medications    Repeat lipid panel (cholesterol levels) the next time you get blood work

## 2024-12-02 RX ORDER — PRAVASTATIN SODIUM 20 MG
20 TABLET ORAL NIGHTLY
Qty: 90 TABLET | Refills: 1 | Status: SHIPPED | OUTPATIENT
Start: 2024-12-02

## 2024-12-02 RX ORDER — PANTOPRAZOLE SODIUM 40 MG/1
40 TABLET, DELAYED RELEASE ORAL
Qty: 90 TABLET | Refills: 1 | OUTPATIENT
Start: 2024-12-02

## 2024-12-02 NOTE — TELEPHONE ENCOUNTER
Received refill request for  pravastatin (PRAVACHOL) 20 MG tablet  from [facility] pharmacy.     Last OV: 6/11/2024 BGC    Next OV: 12/10/2024 BGC    Last Labs: 11/2/2011 Lipid    Last Filled: 11/8/2024  NPSR

## 2024-12-10 ENCOUNTER — OFFICE VISIT (OUTPATIENT)
Dept: CARDIOLOGY CLINIC | Age: 74
End: 2024-12-10
Payer: MEDICARE

## 2024-12-10 VITALS
WEIGHT: 169.4 LBS | SYSTOLIC BLOOD PRESSURE: 144 MMHG | HEIGHT: 60 IN | HEART RATE: 78 BPM | OXYGEN SATURATION: 95 % | DIASTOLIC BLOOD PRESSURE: 78 MMHG | BODY MASS INDEX: 33.26 KG/M2

## 2024-12-10 DIAGNOSIS — I25.83 CORONARY ARTERY DISEASE DUE TO LIPID RICH PLAQUE: Primary | ICD-10-CM

## 2024-12-10 DIAGNOSIS — I25.10 CORONARY ARTERY DISEASE DUE TO LIPID RICH PLAQUE: Primary | ICD-10-CM

## 2024-12-10 DIAGNOSIS — Z72.0 TOBACCO ABUSE: ICD-10-CM

## 2024-12-10 DIAGNOSIS — E78.5 DYSLIPIDEMIA: ICD-10-CM

## 2024-12-10 DIAGNOSIS — E11.9 TYPE 2 DIABETES MELLITUS WITHOUT COMPLICATION, WITHOUT LONG-TERM CURRENT USE OF INSULIN (HCC): ICD-10-CM

## 2024-12-10 DIAGNOSIS — I65.23 BILATERAL CAROTID ARTERY STENOSIS: ICD-10-CM

## 2024-12-10 DIAGNOSIS — Z95.5 S/P CORONARY ARTERY STENT PLACEMENT: ICD-10-CM

## 2024-12-10 DIAGNOSIS — I63.9 ACUTE CVA (CEREBROVASCULAR ACCIDENT) (HCC): ICD-10-CM

## 2024-12-10 DIAGNOSIS — I10 HTN (HYPERTENSION), BENIGN: ICD-10-CM

## 2024-12-10 DIAGNOSIS — Z95.1 S/P CABG (CORONARY ARTERY BYPASS GRAFT): ICD-10-CM

## 2024-12-10 DIAGNOSIS — I48.0 PAF (PAROXYSMAL ATRIAL FIBRILLATION) (HCC): ICD-10-CM

## 2024-12-10 PROCEDURE — G8417 CALC BMI ABV UP PARAM F/U: HCPCS | Performed by: STUDENT IN AN ORGANIZED HEALTH CARE EDUCATION/TRAINING PROGRAM

## 2024-12-10 PROCEDURE — G8484 FLU IMMUNIZE NO ADMIN: HCPCS | Performed by: STUDENT IN AN ORGANIZED HEALTH CARE EDUCATION/TRAINING PROGRAM

## 2024-12-10 PROCEDURE — 1159F MED LIST DOCD IN RCRD: CPT | Performed by: STUDENT IN AN ORGANIZED HEALTH CARE EDUCATION/TRAINING PROGRAM

## 2024-12-10 PROCEDURE — 4004F PT TOBACCO SCREEN RCVD TLK: CPT | Performed by: STUDENT IN AN ORGANIZED HEALTH CARE EDUCATION/TRAINING PROGRAM

## 2024-12-10 PROCEDURE — 3077F SYST BP >= 140 MM HG: CPT | Performed by: STUDENT IN AN ORGANIZED HEALTH CARE EDUCATION/TRAINING PROGRAM

## 2024-12-10 PROCEDURE — 1123F ACP DISCUSS/DSCN MKR DOCD: CPT | Performed by: STUDENT IN AN ORGANIZED HEALTH CARE EDUCATION/TRAINING PROGRAM

## 2024-12-10 PROCEDURE — 3078F DIAST BP <80 MM HG: CPT | Performed by: STUDENT IN AN ORGANIZED HEALTH CARE EDUCATION/TRAINING PROGRAM

## 2024-12-10 PROCEDURE — G8400 PT W/DXA NO RESULTS DOC: HCPCS | Performed by: STUDENT IN AN ORGANIZED HEALTH CARE EDUCATION/TRAINING PROGRAM

## 2024-12-10 PROCEDURE — 2022F DILAT RTA XM EVC RTNOPTHY: CPT | Performed by: STUDENT IN AN ORGANIZED HEALTH CARE EDUCATION/TRAINING PROGRAM

## 2024-12-10 PROCEDURE — 3046F HEMOGLOBIN A1C LEVEL >9.0%: CPT | Performed by: STUDENT IN AN ORGANIZED HEALTH CARE EDUCATION/TRAINING PROGRAM

## 2024-12-10 PROCEDURE — G8427 DOCREV CUR MEDS BY ELIG CLIN: HCPCS | Performed by: STUDENT IN AN ORGANIZED HEALTH CARE EDUCATION/TRAINING PROGRAM

## 2024-12-10 PROCEDURE — 99214 OFFICE O/P EST MOD 30 MIN: CPT | Performed by: STUDENT IN AN ORGANIZED HEALTH CARE EDUCATION/TRAINING PROGRAM

## 2024-12-10 PROCEDURE — 3017F COLORECTAL CA SCREEN DOC REV: CPT | Performed by: STUDENT IN AN ORGANIZED HEALTH CARE EDUCATION/TRAINING PROGRAM

## 2024-12-10 PROCEDURE — 1090F PRES/ABSN URINE INCON ASSESS: CPT | Performed by: STUDENT IN AN ORGANIZED HEALTH CARE EDUCATION/TRAINING PROGRAM

## 2025-03-28 RX ORDER — METOPROLOL TARTRATE 100 MG/1
TABLET ORAL
Qty: 135 TABLET | Refills: 1 | Status: SHIPPED | OUTPATIENT
Start: 2025-03-28

## 2025-03-28 NOTE — TELEPHONE ENCOUNTER
Received refill request for metoprolol (LOPRESSOR) 100 MG tablet  from [facility] pharmacy.     Last OV: 12/10/2024 BGC    Next OV: 12/19/2025 BGC    Last Labs: 4/8/2024 EKG    Last Filled: 10/1/2024 NPSR

## 2025-06-16 RX ORDER — ROSUVASTATIN CALCIUM 20 MG/1
20 TABLET, COATED ORAL DAILY
Qty: 90 TABLET | Refills: 1 | Status: SHIPPED | OUTPATIENT
Start: 2025-06-16

## 2025-06-16 RX ORDER — PRAVASTATIN SODIUM 20 MG
20 TABLET ORAL NIGHTLY
Qty: 90 TABLET | Refills: 1 | OUTPATIENT
Start: 2025-06-16

## 2025-06-28 NOTE — H&P
"Chief Complaint   Patient presents with    Headache     Right side; started yesterday     Ambulatory to triage for above complaints. Denies any nausea/vomiting or dizziness; states pain is pulsating; pain is on and off and wasn't able to sleep last night    BP (!) 147/91   Pulse 94   Temp 36.2 °C (97.2 °F) (Temporal)   Resp 14   Ht 1.575 m (5' 2\")   Wt 65.7 kg (144 lb 13.5 oz)   SpO2 93%   BMI 26.49 kg/m²     " Vw)    Result Date: 3/29/2019  EXAMINATION: TWO VIEWS OF THE CHEST 3/29/2019 2:27 pm COMPARISON: 03/27/2019 HISTORY: ORDERING SYSTEM PROVIDED HISTORY: Chest Discomfort TECHNOLOGIST PROVIDED HISTORY: Reason for exam:->Chest Discomfort Ordering Physician Provided Reason for Exam: Palpitations (pt in by emre patricia from home with c/o continued palpitations- pt states was seen here and did not want to stay- signed out- palpitations had gone- now back- unable to see cards till next week ) Acuity: Unknown Type of Exam: Unknown FINDINGS: Status post coronary bypass. Heart size and pulmonary vasculature within normal limits. Lungs clear. Costophrenic angles sharp     No active cardiopulmonary disease     Xr Chest Portable    Result Date: 3/27/2019  EXAMINATION: SINGLE XRAY VIEW OF THE CHEST 3/27/2019 8:13 pm COMPARISON: 03/17/2018 HISTORY: ORDERING SYSTEM PROVIDED HISTORY: palpitations TECHNOLOGIST PROVIDED HISTORY: Reason for exam:->palpitations Ordering Physician Provided Reason for Exam: Palpitations (Pt reporting palpitations that began this morning when she woke up, reports that palpitations subsided however they came back this evening. SOB with palpations, denies hx of a fib.) Acuity: Unknown Type of Exam: Unknown FINDINGS: Sternotomy wires are noted. The heart size and mediastinal contours are stable. The lungs are clear. Stable portable study. Assessment:   Principal Problem:    Atrial flutter with rapid ventricular response (HCC)  Active Problems:    CAD (coronary artery disease)    HTN (hypertension)    Hyperlipidemia    DMII (diabetes mellitus, type 2) (HCC)  Resolved Problems:    * No resolved hospital problems. *      Plan:       Atrial flutter/fibrillation with rapid ventricular response (HCC) - Pt was started on a Cardizem drip to control heart rate, and will be monitored on Telemetry. Cardiology has been consulted to help in evaluation and treatment.  Pt has been started on therapeutic Lovenox. Thyroid function tests have been ordered. CAD (coronary artery disease) - currently stable. Pt denies chest pain. Continue Beta Blocker, Statin and Aspirin. Monitor on Telemetry. Diabetes mellitus II - SSI and CCD    Essential (primary) hypertension - continue home meds and monitor blood pressure    Hyperlipidemia - No current evidence of Rhabdomyolysis or other adverse effects. Continue statin therapy while in the hospital        DVT Prophylaxis: Lovenox  Diet: DIET CARB CONTROL;  Code Status: Full Code  (Advanced care planning has been discussed with patient and/or responsible family member and is reflected in the code status. Further orders associated with this have been entered if appropriate)    Disposition: Anticipate that patient will remain in the hospital for 2 to 3 days depending on further evaluation and clinical course.      Lyn Carmichael MD

## 2025-07-03 PROCEDURE — 93298 REM INTERROG DEV EVAL SCRMS: CPT | Performed by: INTERNAL MEDICINE

## (undated) DEVICE — POSITIONER,HEAD,RING CUSHION,9IN,32CS: Brand: MEDLINE

## (undated) DEVICE — SUTURE VCRL + SZ 1 L36IN ABSRB UD L36MM CT-1 1/2 CIR VCP947H

## (undated) DEVICE — CIRCUIT VENTILATOR 2 LIMB AD 72 IN 22 MM CONVENTIONAL N HEAT

## (undated) DEVICE — PAD,NON-ADHERENT,3X8,STERILE,LF,1/PK: Brand: MEDLINE

## (undated) DEVICE — MERCY FAIRFIELD TURNOVER KIT: Brand: MEDLINE INDUSTRIES, INC.

## (undated) DEVICE — MARKER,SKIN,WI/RULER AND LABELS: Brand: MEDLINE

## (undated) DEVICE — HANDPIECE SET WITH HIGH FLOW TIP AND SUCTION TUBE: Brand: INTERPULSE

## (undated) DEVICE — MAJOR SET UP PK

## (undated) DEVICE — HOOD: Brand: FLYTE

## (undated) DEVICE — APPLICATOR MEDICATED 26 CC SOLUTION HI LT ORNG CHLORAPREP

## (undated) DEVICE — SHEET,DRAPE,53X77,STERILE: Brand: MEDLINE

## (undated) DEVICE — BIPOLAR SEALER 23-112-1 AQM 6.0: Brand: AQUAMANTYS ®

## (undated) DEVICE — HYPODERMIC SAFETY NEEDLE: Brand: MAGELLAN

## (undated) DEVICE — COVER,MAYO STAND,XL,STERILE: Brand: MEDLINE

## (undated) DEVICE — BIT DRL L30MM DIA3.2MM DISP FOR G7 2 MOBILITY CONSTRUCT

## (undated) DEVICE — SYSTEM SKIN CLSR 22CM DERMBND PRINEO

## (undated) DEVICE — INVIEW CLEAR LEGGINGS: Brand: CONVERTORS

## (undated) DEVICE — 6617 IOBAN II PATIENT ISOLATION DRAPE 5/BX,4BX/CS: Brand: STERI-DRAPE™ IOBAN™ 2

## (undated) DEVICE — MAT FLR W28XL40IN STD GRN 60% RECYCL MAT LO ABSRB SGL LAYR

## (undated) DEVICE — HOOD, PEEL-AWAY: Brand: FLYTE

## (undated) DEVICE — SUTURE STRATAFIX SZ 1 L14IN ABSRB VLT L36MM MO-4 TAPERPOINT SXPD2B400

## (undated) DEVICE — SUTURE ETHBND EXCEL SZ 5 L30IN NONABSORBABLE GRN L48MM V-40 MB46G

## (undated) DEVICE — SYRINGE MED 30ML STD CLR PLAS LUERLOCK TIP N CTRL DISP

## (undated) DEVICE — PACK SURG HIP ASSESSORY

## (undated) DEVICE — SUTURE MCRYL + SZ 3-0 L27IN ABSRB UD PS1 L24MM 3/8 CIR REV MCP936H

## (undated) DEVICE — CONTAINER,SPECIMEN,OR STERILE,4OZ: Brand: MEDLINE

## (undated) DEVICE — BANDAGE COBAN 4 IN COMPR W4INXL5YD FOAM COHESIVE QUIK STK SELF ADH SFT

## (undated) DEVICE — RESTRAINT EXT ANK WRST LT BLU FOAM 2 STRP SIDE BCKL HK AND

## (undated) DEVICE — SUTURE VCRL + SZ 2-0 L36IN ABSRB UD L36MM CT-1 1/2 CIR VCP945H

## (undated) DEVICE — DECANTER FLD 9IN ST BG FOR ASEP TRNSF OF FLD

## (undated) DEVICE — STRYKER PERFORMANCE SERIES SAGITTAL BLADE: Brand: STRYKER PERFORMANCE SERIES

## (undated) DEVICE — BOWL MED L 32OZ PLAS W/ MOLD GRAD EZ OPN PEEL PCH

## (undated) DEVICE — Device: Brand: COVER, PERINEAL POST, 12 PK

## (undated) DEVICE — SYRINGE MED 10ML TRNSLUC BRL PLUNG BLK MRK POLYPR CTRL

## (undated) DEVICE — PADDING UNDERCAST W4INXL4YD 100% COT CRIMPED FINISH WBRL II

## (undated) DEVICE — YANKAUER,OPEN TIP,W/O VENT,STERILE: Brand: MEDLINE INDUSTRIES, INC.

## (undated) DEVICE — C-ARM: Brand: UNBRANDED

## (undated) DEVICE — SUTURE VCRL SZ 0 L36IN ABSRB UD CT-1 L36MM 1/2 CIR TAPR PNT VCP946H

## (undated) DEVICE — DRESSING WND 4X8 IN ANTIMICROBIAL CONTACT SYS THERABOND 3D

## (undated) DEVICE — SOLUTION IRRIG 2000ML 0.9% SOD CHL USP UROMATIC PLAS CONT